# Patient Record
Sex: FEMALE | Race: WHITE | NOT HISPANIC OR LATINO | Employment: FULL TIME | URBAN - METROPOLITAN AREA
[De-identification: names, ages, dates, MRNs, and addresses within clinical notes are randomized per-mention and may not be internally consistent; named-entity substitution may affect disease eponyms.]

---

## 2023-03-21 PROBLEM — B34.9 VIRAL ILLNESS: Status: RESOLVED | Noted: 2023-03-21 | Resolved: 2023-03-21

## 2023-03-21 PROBLEM — N89.8 VAGINAL DISCHARGE: Status: RESOLVED | Noted: 2023-03-21 | Resolved: 2023-03-21

## 2023-03-21 PROBLEM — B96.89 BACTERIAL VAGINITIS: Status: RESOLVED | Noted: 2023-03-21 | Resolved: 2023-03-21

## 2023-03-21 PROBLEM — A60.04 HERPES SIMPLEX VULVOVAGINITIS: Status: RESOLVED | Noted: 2023-03-21 | Resolved: 2023-03-21

## 2023-03-21 PROBLEM — N76.0 BACTERIAL VAGINITIS: Status: RESOLVED | Noted: 2023-03-21 | Resolved: 2023-03-21

## 2023-03-21 RX ORDER — OMEGA-3 FATTY ACIDS/FISH OIL 340-1000MG
CAPSULE ORAL
COMMUNITY
End: 2023-10-13 | Stop reason: ALTCHOICE

## 2023-03-21 RX ORDER — ONDANSETRON HYDROCHLORIDE 8 MG/1
8 TABLET, FILM COATED ORAL EVERY 8 HOURS PRN
COMMUNITY
Start: 2023-02-02 | End: 2023-10-13 | Stop reason: ALTCHOICE

## 2023-03-21 RX ORDER — VIT C/E/ZN/COPPR/LUTEIN/ZEAXAN 250MG-90MG
CAPSULE ORAL
COMMUNITY
End: 2023-10-13 | Stop reason: ALTCHOICE

## 2023-03-21 RX ORDER — EPINEPHRINE 0.22MG
100 AEROSOL WITH ADAPTER (ML) INHALATION DAILY
COMMUNITY
End: 2023-10-13 | Stop reason: ALTCHOICE

## 2023-03-21 RX ORDER — EPINEPHRINE 0.3 MG/.3ML
INJECTION SUBCUTANEOUS
COMMUNITY
Start: 2023-02-17

## 2023-03-21 RX ORDER — PARSLEY 450 MG
1 CAPSULE ORAL DAILY
COMMUNITY
Start: 2021-10-15 | End: 2023-10-13 | Stop reason: ALTCHOICE

## 2023-03-21 RX ORDER — PROCHLORPERAZINE MALEATE 10 MG
10 TABLET ORAL EVERY 6 HOURS PRN
COMMUNITY
Start: 2023-02-02 | End: 2023-10-13 | Stop reason: ALTCHOICE

## 2023-03-21 RX ORDER — LORAZEPAM 0.5 MG/1
TABLET ORAL
COMMUNITY
Start: 2023-03-16 | End: 2023-10-13 | Stop reason: ALTCHOICE

## 2023-03-21 RX ORDER — FEVERFEW EXTRACT 4:1
POWDER (GRAM) MISCELLANEOUS
COMMUNITY
End: 2023-10-13 | Stop reason: ALTCHOICE

## 2023-03-21 RX ORDER — LIDOCAINE AND PRILOCAINE 25; 25 MG/G; MG/G
CREAM TOPICAL
COMMUNITY
Start: 2023-02-02 | End: 2023-10-13 | Stop reason: ALTCHOICE

## 2023-03-21 RX ORDER — NAPROXEN SODIUM 220 MG/1
TABLET ORAL
COMMUNITY
End: 2023-10-13 | Stop reason: ALTCHOICE

## 2023-03-21 RX ORDER — MACA
POWDER (GRAM) MISCELLANEOUS
COMMUNITY
End: 2023-10-13 | Stop reason: ALTCHOICE

## 2023-03-22 ENCOUNTER — APPOINTMENT (OUTPATIENT)
Dept: PRIMARY CARE | Facility: CLINIC | Age: 43
End: 2023-03-22
Payer: COMMERCIAL

## 2023-08-25 ENCOUNTER — HOSPITAL ENCOUNTER (OUTPATIENT)
Dept: DATA CONVERSION | Facility: HOSPITAL | Age: 43
End: 2023-08-25
Attending: SURGERY | Admitting: SURGERY
Payer: COMMERCIAL

## 2023-08-25 DIAGNOSIS — C50.911 MALIGNANT NEOPLASM OF UNSPECIFIED SITE OF RIGHT FEMALE BREAST (MULTI): ICD-10-CM

## 2023-08-25 DIAGNOSIS — Z17.1 ESTROGEN RECEPTOR NEGATIVE STATUS (ER-): ICD-10-CM

## 2023-08-25 DIAGNOSIS — Z92.21 PERSONAL HISTORY OF ANTINEOPLASTIC CHEMOTHERAPY: ICD-10-CM

## 2023-08-25 DIAGNOSIS — C50.511 MALIGNANT NEOPLASM OF LOWER-OUTER QUADRANT OF RIGHT FEMALE BREAST (MULTI): ICD-10-CM

## 2023-08-30 LAB
COMPLETE PATHOLOGY REPORT: NORMAL
CONVERTED CLINICAL DIAGNOSIS-HISTORY: NORMAL
CONVERTED FINAL DIAGNOSIS: NORMAL
CONVERTED FINAL REPORT PDF LINK TO COPY AND PASTE: NORMAL
CONVERTED GROSS DESCRIPTION: NORMAL
CONVERTED SYNOPTIC DIAGNOSIS: NORMAL

## 2023-09-26 VITALS — HEIGHT: 66 IN | WEIGHT: 143.96 LBS | BODY MASS INDEX: 23.14 KG/M2

## 2023-09-26 DIAGNOSIS — C50.919 MALIGNANT NEOPLASM OF FEMALE BREAST, UNSPECIFIED ESTROGEN RECEPTOR STATUS, UNSPECIFIED LATERALITY, UNSPECIFIED SITE OF BREAST (MULTI): Primary | ICD-10-CM

## 2023-09-26 RX ORDER — HEPARIN SODIUM,PORCINE/PF 10 UNIT/ML
50 SYRINGE (ML) INTRAVENOUS AS NEEDED
Status: CANCELLED | OUTPATIENT
Start: 2023-09-30

## 2023-09-26 RX ORDER — HEPARIN 100 UNIT/ML
500 SYRINGE INTRAVENOUS AS NEEDED
Status: CANCELLED | OUTPATIENT
Start: 2023-09-30

## 2023-09-29 VITALS — BODY MASS INDEX: 22.53 KG/M2 | HEIGHT: 66 IN | WEIGHT: 140.21 LBS

## 2023-09-30 RX ORDER — PROCHLORPERAZINE EDISYLATE 5 MG/ML
10 INJECTION INTRAMUSCULAR; INTRAVENOUS EVERY 6 HOURS PRN
Status: CANCELLED | OUTPATIENT
Start: 2023-10-19

## 2023-09-30 RX ORDER — ALBUTEROL SULFATE 0.83 MG/ML
3 SOLUTION RESPIRATORY (INHALATION) AS NEEDED
Status: CANCELLED | OUTPATIENT
Start: 2023-10-19

## 2023-09-30 RX ORDER — FAMOTIDINE 10 MG/ML
20 INJECTION INTRAVENOUS ONCE AS NEEDED
Status: CANCELLED | OUTPATIENT
Start: 2023-10-19

## 2023-09-30 RX ORDER — METHYLPREDNISOLONE SODIUM SUCCINATE 40 MG/ML
40 INJECTION INTRAMUSCULAR; INTRAVENOUS AS NEEDED
Status: CANCELLED | OUTPATIENT
Start: 2023-10-19

## 2023-09-30 RX ORDER — PROCHLORPERAZINE MALEATE 5 MG
10 TABLET ORAL EVERY 6 HOURS PRN
Status: CANCELLED | OUTPATIENT
Start: 2023-10-19

## 2023-09-30 RX ORDER — DIPHENHYDRAMINE HYDROCHLORIDE 50 MG/ML
50 INJECTION INTRAMUSCULAR; INTRAVENOUS AS NEEDED
Status: CANCELLED | OUTPATIENT
Start: 2023-10-19

## 2023-09-30 RX ORDER — EPINEPHRINE 0.3 MG/.3ML
0.3 INJECTION SUBCUTANEOUS EVERY 5 MIN PRN
Status: CANCELLED | OUTPATIENT
Start: 2023-10-19

## 2023-09-30 NOTE — H&P
History & Physical Reviewed:   Pregnant/Lactating:  ·  Are You Pregnant no   ·  Are You Currently Breastfeeding no     I have reviewed the History and Physical dated:  27-Jul-2023   History and Physical reviewed and relevant findings noted. Patient examined to review pertinent physical  findings.: No significant changes   Home Medications Reviewed: no changes noted   Allergies Reviewed: no changes noted       ERAS (Enhanced Recovery After Surgery):  ·  ERAS Patient: no     Consent:   COVID-19 Consent:  ·  COVID-19 Risk Consent Surgeon has reviewed key risks related to the risk of xochitl COVID-19 and if they contract COVID-19 what the risks are.       Electronic Signatures:  Rod Tate)  (Signed 25-Aug-2023 11:04)   Authored: History & Physical Reviewed, ERAS, Consent,  Note Completion      Last Updated: 25-Aug-2023 11:04 by Rod Tate)

## 2023-10-01 NOTE — OP NOTE
PROCEDURE DETAILS    Preoperative Diagnosis:  Right breast cancer status post neoadjuvant chemotherapy  Postoperative Diagnosis:  Right breast cancer status post neoadjuvant chemotherapy  Surgeon: Rod Tate  Resident/Fellow/Other Assistant: MS3    Procedure:  1. Right Breast Needle Localized partial mastectomy  2.  Complex closure 4 cm  3.  [place] Lymph Node Biopsy     Anesthesia: Daniel Goncalves  Estimated Blood Loss: minimal  Findings: [place] lymph node and additional nodes felt normal  Partial mastectomy had clip for the primary tumor no clip seen for the lymph node  Additional tissue sent  Specimens(s) Collected: yes,  right sentinel lymph node, additional nodes partial mastectomy and additional tissue right breast    Patient Returned To/Condition: To recovery room in satisfactory condition        Operative Report:   Clinical note:   This is a woman who presented with a palpable right breast mass.  A biopsy showed a triple negative breast cancer.  She had a second mass seen on imaging and this was an intramammary lymph node which was positive for malignancy.  She received preoperative  chemotherapy and Pembro.  Clinically she had a complete response.  She had a wire placed to the area of the previously palpable mass and 1 to the area of the lymph node.  I injected with technetium 99 labeled solution Lymphoseek to identify the sentinel  nodes.  Identified 1 sentinel node with 250 counts.  There were 2 smaller nodes that came out with this.  They all felt normal.  On the specimen the clip for the lymph node was not seen so additional tissue was taken but still no clip.  On ultrasound  only the clip was seen in the lymph node.  We elected to wait for final pathology and see if the clip was still present on imaging if the lymph node was not seen on pathology.    Operative note:  With the patient in the supine position after adequate general anesthesia was obtained the wires were cut closer to the  skin in the breast chest  neck abdomen upper arm axilla neck were prepped with ChloraPrep she was draped in usual manner.  Made incision lines of the skin of the axilla took this down through stapedius tissue.  Elevated skin flaps in all directions using Bovie cautery.  Using  the gamma probe we identified a hotspot dissected that out with blunt dissection brought up into the wound and excised it using Bovie cautery.  There was a smaller node right below this that came out with the specimen.  This did not have any counts.   The other note at 250 counts.  They both felt normal.  Identified 1 more lymph node which we thought had some counts but after removing from the breast we were just getting background counts.  It is felt normal as well.  We will try the airflow anesthetic  irrigated with saline there was good hemostasis obtained throughout Bovie cautery.  Dana was placed in the base of the wound.  Reapproximated the deep tissue with interrupted 3-0 Vicryl suture.  It reapproximated the skin with interrupted 3-0 Vicryl  suture and closed with a running 4-0 Monocryl subcuticular stitch LiquiBand was applied at the end of the case.  We then made an incision in the breast in the lower outer quadrant between the 2 wires elevated skin flaps in all directions using Bovie cautery.   And for the wires into the skin and pulled up through and then went widely around the wires in all directions using Bovie cautery.  We placed particular attention to staying close to the skin because the lymph nodes look close.  The wire look like it  was on top of the lymph node so he stayed on top of the wires well.  X-ray did not show the clip from the lymph node.  We therefore took some more tissue anteriorly and a little distally as the wire may have been pulled back slightly.  I did not see a  clip in this tissue either.  We examined the tissue could not find the clip.  And infiltrating with local anesthetic irrigated with saline  there was good hemostasis obtained throughout with Bovie cautery and 3-0 Vicryl ties.  We elevated the breast tissue  off of the pectoralis in all directions using Bovie cautery.  We placed clips for future radiation therapy.  We reapproximated the deep tissue with interrupted 3-0 Vicryl suture.  They were then brought tissue down more superficially after raising flaps  with 3-0 Vicryl suture reapproximate the skin with interrupted 3-0 Vicryl suture and closed it with a running 4-0 Monocryl subcuticular stitch LiquiBand fluff dressing and a Surgi-Bra were applied.  She tolerated the procedure well and was taken recovery  room in satisfactory condition      Abiodun Synoptic Op Report:  Breast [place] Node Biopsy  Operation performed with curative intent: yes  Tracer(s) used to identify sentinel nodes in the upfront surgery (non-neoadjuvant) setting: radioactive tracer  Tracer(s) used to identify sentinel nodes in the neoadjuvant setting: not applicable  All nodes (colored or non-colored) present at the end of a dye-filled lymphatic channel were removed: not applicable  All significantly radioactive nodes were removed: yes  All palpably suspicious nodes were removed: yes  Biopsy-proven positive nodes marked with clips prior to chemotherapy were identified and removed: not applicable                    Attestation:   Note Completion:  Attending Attestation I was present for the entire procedure    I am a: Resident/Fellow         Electronic Signatures:  Tamra Brown (Resident))  (Signed 25-Aug-2023 14:50)   Authored: Post-Operative Note, Chart Review, Note Completion  Rod Tate)  (Signed 25-Aug-2023 15:53)   Authored: Post-Operative Note, Chart Review, Note Completion   Co-Signer: Post-Operative Note, Chart Review, Note Completion      Last Updated: 25-Aug-2023 15:53 by Rod Tate)

## 2023-10-11 ENCOUNTER — TELEPHONE (OUTPATIENT)
Dept: RADIATION ONCOLOGY | Facility: CLINIC | Age: 43
End: 2023-10-11
Payer: COMMERCIAL

## 2023-10-11 ENCOUNTER — HOSPITAL ENCOUNTER (OUTPATIENT)
Dept: RADIATION ONCOLOGY | Facility: CLINIC | Age: 43
Setting detail: RADIATION/ONCOLOGY SERIES
Discharge: STILL ADMITTED OR ADMINISTRATIVE | End: 2023-10-11
Payer: COMMERCIAL

## 2023-10-11 ENCOUNTER — LAB (OUTPATIENT)
Dept: LAB | Facility: CLINIC | Age: 43
End: 2023-10-11
Payer: COMMERCIAL

## 2023-10-11 DIAGNOSIS — Z17.1 MALIGNANT NEOPLASM OF LOWER-OUTER QUADRANT OF RIGHT BREAST OF FEMALE, ESTROGEN RECEPTOR NEGATIVE (MULTI): Primary | ICD-10-CM

## 2023-10-11 DIAGNOSIS — C50.511 MALIGNANT NEOPLASM OF LOWER-OUTER QUADRANT OF RIGHT BREAST OF FEMALE, ESTROGEN RECEPTOR NEGATIVE (MULTI): Primary | ICD-10-CM

## 2023-10-11 DIAGNOSIS — Z17.1 MALIGNANT NEOPLASM OF LOWER-OUTER QUADRANT OF RIGHT BREAST OF FEMALE, ESTROGEN RECEPTOR NEGATIVE: ICD-10-CM

## 2023-10-11 DIAGNOSIS — C50.511 MALIGNANT NEOPLASM OF LOWER-OUTER QUADRANT OF RIGHT BREAST OF FEMALE, ESTROGEN RECEPTOR NEGATIVE: ICD-10-CM

## 2023-10-11 LAB — HCG UR QL IA.RAPID: NEGATIVE

## 2023-10-11 PROCEDURE — 77334 RADIATION TREATMENT AID(S): CPT | Performed by: STUDENT IN AN ORGANIZED HEALTH CARE EDUCATION/TRAINING PROGRAM

## 2023-10-11 PROCEDURE — 77290 THER RAD SIMULAJ FIELD CPLX: CPT | Performed by: STUDENT IN AN ORGANIZED HEALTH CARE EDUCATION/TRAINING PROGRAM

## 2023-10-11 PROCEDURE — 81025 URINE PREGNANCY TEST: CPT

## 2023-10-12 PROCEDURE — 77470 SPECIAL RADIATION TREATMENT: CPT | Performed by: STUDENT IN AN ORGANIZED HEALTH CARE EDUCATION/TRAINING PROGRAM

## 2023-10-12 PROCEDURE — 77263 THER RADIOLOGY TX PLNG CPLX: CPT | Performed by: STUDENT IN AN ORGANIZED HEALTH CARE EDUCATION/TRAINING PROGRAM

## 2023-10-13 ENCOUNTER — OFFICE VISIT (OUTPATIENT)
Dept: PRIMARY CARE | Facility: CLINIC | Age: 43
End: 2023-10-13
Payer: COMMERCIAL

## 2023-10-13 VITALS
BODY MASS INDEX: 23.91 KG/M2 | OXYGEN SATURATION: 98 % | HEIGHT: 66 IN | SYSTOLIC BLOOD PRESSURE: 124 MMHG | WEIGHT: 148.8 LBS | DIASTOLIC BLOOD PRESSURE: 74 MMHG | HEART RATE: 99 BPM

## 2023-10-13 DIAGNOSIS — Z17.1 MALIGNANT NEOPLASM OF LOWER-OUTER QUADRANT OF RIGHT BREAST OF FEMALE, ESTROGEN RECEPTOR NEGATIVE (MULTI): Primary | ICD-10-CM

## 2023-10-13 DIAGNOSIS — B34.9 VIRAL ILLNESS: Primary | ICD-10-CM

## 2023-10-13 DIAGNOSIS — C50.511 MALIGNANT NEOPLASM OF LOWER-OUTER QUADRANT OF RIGHT BREAST OF FEMALE, ESTROGEN RECEPTOR NEGATIVE (MULTI): Primary | ICD-10-CM

## 2023-10-13 DIAGNOSIS — J01.10 ACUTE NON-RECURRENT FRONTAL SINUSITIS: ICD-10-CM

## 2023-10-13 PROCEDURE — 99213 OFFICE O/P EST LOW 20 MIN: CPT | Performed by: NURSE PRACTITIONER

## 2023-10-13 PROCEDURE — 1036F TOBACCO NON-USER: CPT | Performed by: NURSE PRACTITIONER

## 2023-10-13 RX ORDER — AMOXICILLIN AND CLAVULANATE POTASSIUM 875; 125 MG/1; MG/1
1 TABLET, FILM COATED ORAL 2 TIMES DAILY
Qty: 20 TABLET | Refills: 0 | Status: SHIPPED | OUTPATIENT
Start: 2023-10-13 | End: 2023-10-23

## 2023-10-13 ASSESSMENT — PATIENT HEALTH QUESTIONNAIRE - PHQ9
SUM OF ALL RESPONSES TO PHQ9 QUESTIONS 1 AND 2: 0
2. FEELING DOWN, DEPRESSED OR HOPELESS: NOT AT ALL
1. LITTLE INTEREST OR PLEASURE IN DOING THINGS: NOT AT ALL

## 2023-10-13 ASSESSMENT — ENCOUNTER SYMPTOMS
RESPIRATORY NEGATIVE: 1
SORE THROAT: 1
FEVER: 0
CARDIOVASCULAR NEGATIVE: 1
PSYCHIATRIC NEGATIVE: 1
CHILLS: 0

## 2023-10-13 ASSESSMENT — COLUMBIA-SUICIDE SEVERITY RATING SCALE - C-SSRS
6. HAVE YOU EVER DONE ANYTHING, STARTED TO DO ANYTHING, OR PREPARED TO DO ANYTHING TO END YOUR LIFE?: NO
1. IN THE PAST MONTH, HAVE YOU WISHED YOU WERE DEAD OR WISHED YOU COULD GO TO SLEEP AND NOT WAKE UP?: NO
2. HAVE YOU ACTUALLY HAD ANY THOUGHTS OF KILLING YOURSELF?: NO

## 2023-10-13 NOTE — PROGRESS NOTES
"Subjective   Patient ID: Jose Honeycutt is a 43 y.o. female who presents for Sore Throat (3-4 days), Headache, and Earache.    Sore Throat  Patient complains of sore throat. Associated symptoms include nasal blockage, post nasal drip, sinus and nasal congestion, and sore throat. Onset of symptoms was 4 day ago, and have been gradually worsening since that time. She is drinking plenty of fluids. She has not had recent close exposure to someone with proven streptococcal pharyngitis.           Review of Systems   Constitutional:  Negative for chills and fever.   HENT:  Positive for postnasal drip, sneezing and sore throat.    Respiratory: Negative.     Cardiovascular: Negative.    Psychiatric/Behavioral: Negative.         Objective   /74   Pulse 99   Ht 1.67 m (5' 5.75\")   Wt 67.5 kg (148 lb 12.8 oz)   SpO2 98%   BMI 24.20 kg/m²     Physical Exam  HENT:      Right Ear: A middle ear effusion is present.      Left Ear: A middle ear effusion is present.      Nose: Congestion present.      Right Sinus: Frontal sinus tenderness present.   Cardiovascular:      Rate and Rhythm: Normal rate.      Heart sounds: Normal heart sounds.   Pulmonary:      Effort: Pulmonary effort is normal.      Breath sounds: Normal breath sounds.   Skin:     Capillary Refill: Capillary refill takes less than 2 seconds.   Neurological:      Mental Status: She is alert.         Assessment/Plan   Problem List Items Addressed This Visit    None  Visit Diagnoses         Codes    Viral illness    -  Primary B34.9    Acute non-recurrent frontal sinusitis     J01.10    Relevant Medications    amoxicillin-pot clavulanate (Augmentin) 875-125 mg tablet          Reviewed with patient at this point I feel it is viral but if symptoms worsen and continue to worsen do not improve sent in Augmentin.  Patient to follow-up as needed     "

## 2023-10-13 NOTE — PATIENT INSTRUCTIONS
Viral Upper Respiratory Infection (Common Cold)  Duration: Symptoms usually last for 3-7 days, though they can linger for up to two weeks.  Onset: Symptoms come on gradually over a few days.  Nasal Discharge: Starts out watery and clear, then becomes thicker and darker.  Fever: Rare in adults, more common in children.  Cough: Common.  Sore Throat: Common at the beginning.  Body Aches: Mild body aches might be present.  Treatment: Supportive care, such as rest, hydration, and over-the-counter cold remedies. Antibiotics are not effective against viruses.  Sinusitis (Sinus Infection)  Duration: Symptoms can be:  Acute: Lasting less than 4 weeks.  Subacute: Lasting 4-12 weeks.  Chronic: Lasting more than 12 weeks.  Onset: Symptoms might start like a cold but then get worse or linger beyond the typical duration of a cold.  Nasal Discharge: Thick yellow or green discharge.  Fever: Possible, especially in acute bacterial sinusitis.  Cough: Often worse at night.  Sore Throat: Can be caused by postnasal drip.  Facial Pain/Pressure: Pain is often located around the forehead, cheeks, and eyes. It may get worse when bending forward.  Other Symptoms: Bad breath, reduced sense of smell/taste, fatigue.  Treatment:  Acute: Might be viral or bacterial. If symptoms don't improve after 7-10 days or get significantly worse after 5-7 days, bacterial sinusitis might be suspected, and antibiotics may be prescribed.  Chronic: Often involves a combination of factors, including potential anatomical issues, allergies, or persistent bacterial or fungal infections. Treatment might involve longer courses of antibiotics, nasal steroids, nasal saline irrigation, or surgery in refractory cases.  Overlap and Complications:  It's worth noting that a cold (viral upper respiratory infection) can lead to sinusitis. If the sinuses get blocked during a cold, they can become a breeding ground for bacteria, leading to a secondary bacterial infection.   Detail Level: Detailed Cpt Desired:  Showing: no pathologic findings Koh Intro Text (From The.....): A KOH prep was ordered and evaluated from the Koh Procedure Text (Tissue Harvesting Technique): A 15-blade scalpel was used to scrape the skin. The skin scrapings were placed on a glass slide, covered with a coverslip and a KOH solution was applied.

## 2023-10-15 PROBLEM — C50.511 MALIGNANT NEOPLASM OF LOWER-OUTER QUADRANT OF RIGHT BREAST OF FEMALE, ESTROGEN RECEPTOR NEGATIVE (MULTI): Status: ACTIVE | Noted: 2023-10-15

## 2023-10-15 PROBLEM — Z17.1 MALIGNANT NEOPLASM OF LOWER-OUTER QUADRANT OF RIGHT BREAST OF FEMALE, ESTROGEN RECEPTOR NEGATIVE (MULTI): Status: ACTIVE | Noted: 2023-10-15

## 2023-10-16 ENCOUNTER — LAB (OUTPATIENT)
Dept: HEMATOLOGY/ONCOLOGY | Facility: HOSPITAL | Age: 43
End: 2023-10-16
Payer: COMMERCIAL

## 2023-10-16 DIAGNOSIS — C50.919 MALIGNANT NEOPLASM OF BREAST IN FEMALE, ESTROGEN RECEPTOR NEGATIVE, UNSPECIFIED LATERALITY, UNSPECIFIED SITE OF BREAST (MULTI): ICD-10-CM

## 2023-10-16 DIAGNOSIS — C50.919 MALIGNANT NEOPLASM OF BREAST IN FEMALE, ESTROGEN RECEPTOR NEGATIVE, UNSPECIFIED LATERALITY, UNSPECIFIED SITE OF BREAST (MULTI): Primary | ICD-10-CM

## 2023-10-16 DIAGNOSIS — Z17.1 MALIGNANT NEOPLASM OF BREAST IN FEMALE, ESTROGEN RECEPTOR NEGATIVE, UNSPECIFIED LATERALITY, UNSPECIFIED SITE OF BREAST (MULTI): ICD-10-CM

## 2023-10-16 DIAGNOSIS — C50.819 MALIGNANT NEOPLASM OF OVERLAPPING SITES OF BREAST IN FEMALE, ESTROGEN RECEPTOR NEGATIVE, UNSPECIFIED LATERALITY (MULTI): Primary | ICD-10-CM

## 2023-10-16 DIAGNOSIS — Z17.1 MALIGNANT NEOPLASM OF BREAST IN FEMALE, ESTROGEN RECEPTOR NEGATIVE, UNSPECIFIED LATERALITY, UNSPECIFIED SITE OF BREAST (MULTI): Primary | ICD-10-CM

## 2023-10-16 DIAGNOSIS — Z17.1 MALIGNANT NEOPLASM OF OVERLAPPING SITES OF BREAST IN FEMALE, ESTROGEN RECEPTOR NEGATIVE, UNSPECIFIED LATERALITY (MULTI): Primary | ICD-10-CM

## 2023-10-16 DIAGNOSIS — C50.511 MALIGNANT NEOPLASM OF LOWER-OUTER QUADRANT OF RIGHT BREAST OF FEMALE, ESTROGEN RECEPTOR NEGATIVE (MULTI): ICD-10-CM

## 2023-10-16 DIAGNOSIS — Z17.1 MALIGNANT NEOPLASM OF LOWER-OUTER QUADRANT OF RIGHT BREAST OF FEMALE, ESTROGEN RECEPTOR NEGATIVE (MULTI): ICD-10-CM

## 2023-10-16 LAB
ALBUMIN SERPL BCP-MCNC: 4.1 G/DL (ref 3.4–5)
ALP SERPL-CCNC: 92 U/L (ref 33–110)
ALT SERPL W P-5'-P-CCNC: 22 U/L (ref 7–45)
ANION GAP SERPL CALC-SCNC: 12 MMOL/L (ref 10–20)
AST SERPL W P-5'-P-CCNC: 17 U/L (ref 9–39)
B-HCG SERPL-ACNC: <3 MIU/ML
BASOPHILS # BLD AUTO: 0.02 X10*3/UL (ref 0–0.1)
BASOPHILS NFR BLD AUTO: 0.4 %
BILIRUB SERPL-MCNC: 0.3 MG/DL (ref 0–1.2)
BUN SERPL-MCNC: 13 MG/DL (ref 6–23)
CALCIUM SERPL-MCNC: 9.3 MG/DL (ref 8.6–10.3)
CHLORIDE SERPL-SCNC: 104 MMOL/L (ref 98–107)
CO2 SERPL-SCNC: 28 MMOL/L (ref 21–32)
CREAT SERPL-MCNC: 0.55 MG/DL (ref 0.5–1.05)
EOSINOPHIL # BLD AUTO: 0.16 X10*3/UL (ref 0–0.7)
EOSINOPHIL NFR BLD AUTO: 3.3 %
ERYTHROCYTE [DISTWIDTH] IN BLOOD BY AUTOMATED COUNT: 12.2 % (ref 11.5–14.5)
GFR SERPL CREATININE-BSD FRML MDRD: >90 ML/MIN/1.73M*2
GLUCOSE SERPL-MCNC: 96 MG/DL (ref 74–99)
HCT VFR BLD AUTO: 36.9 % (ref 36–46)
HGB BLD-MCNC: 12.4 G/DL (ref 12–16)
IMM GRANULOCYTES # BLD AUTO: 0.05 X10*3/UL (ref 0–0.7)
IMM GRANULOCYTES NFR BLD AUTO: 1 % (ref 0–0.9)
LYMPHOCYTES # BLD AUTO: 1.42 X10*3/UL (ref 1.2–4.8)
LYMPHOCYTES NFR BLD AUTO: 28.9 %
MCH RBC QN AUTO: 30.7 PG (ref 26–34)
MCHC RBC AUTO-ENTMCNC: 33.6 G/DL (ref 32–36)
MCV RBC AUTO: 91 FL (ref 80–100)
MONOCYTES # BLD AUTO: 0.41 X10*3/UL (ref 0.1–1)
MONOCYTES NFR BLD AUTO: 8.4 %
NEUTROPHILS # BLD AUTO: 2.85 X10*3/UL (ref 1.2–7.7)
NEUTROPHILS NFR BLD AUTO: 58 %
NRBC BLD-RTO: 0 /100 WBCS (ref 0–0)
PLATELET # BLD AUTO: 186 X10*3/UL (ref 150–450)
PMV BLD AUTO: 9.5 FL (ref 7.5–11.5)
POTASSIUM SERPL-SCNC: 3.9 MMOL/L (ref 3.5–5.3)
PROT SERPL-MCNC: 6.8 G/DL (ref 6.4–8.2)
RBC # BLD AUTO: 4.04 X10*6/UL (ref 4–5.2)
SODIUM SERPL-SCNC: 140 MMOL/L (ref 136–145)
TSH SERPL-ACNC: 1.26 MIU/L (ref 0.44–3.98)
WBC # BLD AUTO: 4.9 X10*3/UL (ref 4.4–11.3)

## 2023-10-16 PROCEDURE — 84443 ASSAY THYROID STIM HORMONE: CPT | Mod: CMCLAB

## 2023-10-16 PROCEDURE — 84702 CHORIONIC GONADOTROPIN TEST: CPT

## 2023-10-16 PROCEDURE — 36591 DRAW BLOOD OFF VENOUS DEVICE: CPT

## 2023-10-16 PROCEDURE — 84075 ASSAY ALKALINE PHOSPHATASE: CPT

## 2023-10-16 PROCEDURE — 85025 COMPLETE CBC W/AUTO DIFF WBC: CPT

## 2023-10-16 PROCEDURE — 84702 CHORIONIC GONADOTROPIN TEST: CPT | Mod: CMCLAB

## 2023-10-16 PROCEDURE — 96523 IRRIG DRUG DELIVERY DEVICE: CPT

## 2023-10-18 ENCOUNTER — APPOINTMENT (OUTPATIENT)
Dept: INTEGRATIVE MEDICINE | Facility: CLINIC | Age: 43
End: 2023-10-18
Payer: COMMERCIAL

## 2023-10-18 ENCOUNTER — ALLIED HEALTH (OUTPATIENT)
Dept: INTEGRATIVE MEDICINE | Facility: CLINIC | Age: 43
End: 2023-10-18
Payer: COMMERCIAL

## 2023-10-18 DIAGNOSIS — C50.511 MALIGNANT NEOPLASM OF LOWER-OUTER QUADRANT OF RIGHT BREAST OF FEMALE, ESTROGEN RECEPTOR NEGATIVE (MULTI): Primary | ICD-10-CM

## 2023-10-18 DIAGNOSIS — Z17.1 MALIGNANT NEOPLASM OF LOWER-OUTER QUADRANT OF RIGHT BREAST OF FEMALE, ESTROGEN RECEPTOR NEGATIVE (MULTI): Primary | ICD-10-CM

## 2023-10-18 PROCEDURE — 99213 OFFICE O/P EST LOW 20 MIN: CPT | Performed by: HOSPITALIST

## 2023-10-18 ASSESSMENT — PAIN SCALES - GENERAL: PAINLEVEL_OUTOF10: 3

## 2023-10-18 NOTE — PROGRESS NOTES
Acupuncture Visit:     Subjective   Patient ID: Jose Honeycutt is a 43 y.o. female who presents for No chief complaint on file.  HPI     Labs reviewed per Integrative oncology guidelines. ANC >1000, Platelets >20. No contraindications to treatment. Patient advised of usual risks of acupuncture including bleeding, bruising, and infection and verbalized understanding.     Congestion PND cold since last week  Sore thoroat at beginning  Right side hip flexor more painful . Pain in foot is still there, not worse  Leg only painful when moving  Hot flashes are less intense, but still present   Energy OK.           Pre-treatment Assessment  Treatment Precautions: None  Pain Score: 4  Anxiety Level (0-10): 2  Stress Level (0-10): 0  Coping Level (0-10): 10  Depression Level (0-10): 0  Fatigue Level (0-10): 3  Nausea Level (0-10): 0  Wellbeing Level (0-10): 2    Review of Systems         Provider reviewed plan for the acupuncture session, precautions and contraindications. Patient/guardian/hospital staff has given consent to treat with full understanding of what to expect during the session. Before acupuncture began, provider explained to the patient to communicate at any time if the procedure was causing discomfort past their tolerance level. Patient agreed to advise acupuncturist. The acupuncturist counseled the patient on the risks of acupuncture treatment including pain, infection, bleeding, and no relief of pain. The patient was positioned comfortably. There was no evidence of infection at the site of needle insertions.    Objective   Physical Exam     Points: ear Rubio men, KI, LR, LI11, LI4, HT7, SP6, KI7, KI3, KI6, LR3  add GB40 and bobby little toe MTP right   Heat lamp: none           Needles in/out: 22 +2                   Post-treatment Assessment  Pain Score: 3  Anxiety Level (0-10): 1  Stress Level (0-10): 0  Coping Level (0-10): 10  Depression Level (0-10): 0  Fatigue Level (0-10): 1  Nausea Level (0-10):  0  Wellbeing Level (0-10): 2    Assessment/Plan

## 2023-10-18 NOTE — PROGRESS NOTES
Patient ID: Jose Honeycutt is a 43 y.o. female.  Referring Physician: No referring provider defined for this encounter.  Primary Care Provider: LEAH Talavera-CNP    CANCER HISTORY:   No ref. provider found   For:  [Associated problem not found]   Chief complaints and symptoms:  44 yo woman with R breast IDC, triple negative  Genetics negative  Patient has 2 children, and notes completion of family building.      PMH: SVT/Afib, anxiety, triple negative breast CA   Psych History: Post partum depression/anxiety following birth of son,   generalized anxiety at baseline, is actively doing talk therapy   Surg Hx: catheter ablation 2020 for SVT      Treatment History:   Neoadjuvant carboplatin, paclitaxel, pembrolizumab; followed by doxorubicin,   cyclophosphamide, pembrolizumab (per KEYNOTE-522 protocol):      C1D1 2/16/23 (pembrolizumab was received, but developed a grade 3 infusion   reaction to paclitaxel).   - Switched to Abraxane/carboplatin for week #2. No issues.   - Had possible reaction versus anxiety attack during week #3. Treatment was   held.   Plan to have re-evaluation with surgery and adjuvant AC/keytruda.      now s/p surgery  rad/onc f/u pending      History of Present IllnessConsulted by: Dr. Knight  For: breast cancer     Started AC - c/b fatigue but o/w doing well - feeling good now overall  Bloating but o/w doing well, contd and trying to drink tea and eat smaller portions - improved with similase     Chief complaints and symptoms:  Managing chemo side effects without pharmaceuticals  Menstrual irreg/premature menopause - very limited right now - has some anxiety around periods     Anxiety and diff sleeping - improved significantly but cutting out caffeine, being active - doing well now  waking up in middle of night  improved anxiety and insomnia - ok now     More nausea now, drinking kathe tea     Hot flashes - improved, almost gone     Fatigue - doing well, has for a few days after chemo,  now done, still tired     Joint stiffness a little worse in knees/hips, feet, increased activity     GERD symptoms with chemo, now with bloating  Occas chest pains, burning feeling     Interested in: acupuncture, Chinese herbs, guided imagery, nutrition, stress management     Diet: Eating very well overall, cut out fast food/junk food  More plant based - contd  Doing well overall, has made food changes we suggested     PA: walking 2-3 times/week, using treadmill, not steady right now     Sleep:Diff staying asleep, racing thoughts usually middle of the night - now sleeping better  Doing morning meditation, sleep hygiene  Journals prior to sleeping  Sleeping well now, cut out caffeine  no diff falling asleep but diff staying asleep     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   On all of the above she noted not experiencing any PMS or anxiety. She is   currently only taking gerry root/borage oil and magnesium.      CoQ10 100 mg/day  Vit D3 1000 IU/day  Fish Oil  Gerry Root  Mag  claritin  Lorazepam as needed  Protonix   Consulted by: Dr. Knight  For: breast cancer     Started AC - c/b fatigue but o/w doing well - feeling good now overall  Bloating but o/w doing well, contd and trying to drink tea and eat smaller portions - improved with similase     Chief complaints and symptoms:  Managing chemo side effects without pharmaceuticals  Menstrual irreg/premature menopause - very limited right now - has some anxiety around periods     Anxiety and diff sleeping - improved significantly but cutting out caffeine, being active - doing well now  waking up in middle of night  improved  anxiety and insomnia - ok now     More nausea now, drinking ginger tea     Hot flashes - improved, almost gone     Fatigue - doing well, has for a few days after chemo, now done, still tired     Joint stiffness a little worse in knees/hips  R hip muscle pain and pain in R foot     GERD symptoms with chemo, now with bloating  Occas chest pains, burning feeling     Interested in: acupuncture, Chinese herbs, guided imagery, nutrition, stress management     Diet: Eating very well overall, cut out fast food/junk food  More plant based - contd  Doing well overall, has made food changes we suggested     PA: walking 2-3 times/week, using treadmill, not steady right now     Sleep:Diff staying asleep, racing thoughts usually middle of the night - now sleeping better  Doing morning meditation, sleep hygiene  Journals prior to sleeping  Sleeping well now, cut out caffeine  no diff falling asleep but diff staying asleep     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   On all of the above she noted not experiencing any PMS or anxiety. She is   currently only taking gerry root/borage oil and magnesium.      CoQ10 100 mg/day  Vit D3 1000 IU/day  Fish Oil  Gerry Root  Mag  claritin  Lorazepam as needed  Protonix     ROS:  no ha, visual symptoms, hearing loss  no sob, chest pain, palp  ROS o/w non contributory, please see HPI    Objective    BSA: There is no height or weight on file to calculate BSA.  There were no vitals taken for this visit.    PHYSICAL EXAM:  NAD, awake/alert  HEENT, NCAT, OP clear, no oral lesions  CTA bilat  RRR no mgr  Abd soft/nt/nd+bs  No c/c/e/ttp  Motor/sensory intact,  CN 2-12 intact     RESULTS:  Lab Results   Component Value Date    WBC 4.9 10/16/2023    HGB 12.4 10/16/2023    HCT 36.9 10/16/2023     10/16/2023    CREATININE 0.55 10/16/2023    AST 17 10/16/2023       Integrative Labs:    Functional Tests:    Assessment/Plan   Cancer Staging   Malignant neoplasm of female breast (CMS/HCC)  Staging form: Breast, AJCC 8th Edition  - Clinical stage from 1/19/2023: Stage IIB (cT1c, cN1(f), cM0, G3, ER-, NV-, HER2-) - Signed by Mango Hernandez MD on 10/11/2023  - Pathologic stage from 8/25/2023: No Stage Recommended (ypT0, pN0(sn), cM0, G3, ER-, NV-, HER2-) - Signed by Mango Hernandez MD on 10/11/2023      CANCER SPECIFIC RECCS:  42 yo woman with triple negative breast cancer on neoadj carbo/abraxane/keytruda f/b surgery then AC/keytruda as plan.  Doing well on AC/pembro chemo      Breast cancer:  Whole Foods plant based diet  Limit sugar  Limit alcohol     Exercise 30 min/day 5 days a week, vary by balance, cardio and resistance training  Continue walking now  Some fatigue - consider American ginseng 2 grams/day     Supplements to consider:  Melatonin 1-3 mg at night 1 hr prior to sleep - not taking  Vitamin D3 1792-5092 IU/day  Omega 3 supplementation (nordic naturals)     Sleep - try to get over 7 hours/night  Limit TV or electronics at night  Limit caffeine intake and only to the morning  Get exposure to light in the morning if possible  Meditation and/or breathing exercises in the morning are helpful  5 min breathing exercises: Alternate Nostril Breathing and Derek Corby breathing  Continue walking middle of the day     Sleep hygiene - should try to sleep by 10 pm  Limit TV or exposure to light at night including cell phones  Limit caffeine to AM only if needed (e.g. coffee)  Morning exposure to light, getting outside or bright white light in the morning  [place] Tea  Consider melatonin   Consider massage  Consider similase (integrative therapeutics) for bloating     Irreg  periods - likely from chemo, f/b endocrine     Anxiety - has tried SSRI in past without relief. Manifested with sleep interruption and cardiac symptoms.  - Patient connected with [facility] for peer support   - Patient currently seeing therapist for talk therapy   - Referral made to onco-psychiatry to explore other medication management   Doing better overall, meditating in am  strategies -- maybe beta blocker propranolol/atenolol?   cognitive behavioral therapy - seeing her as well, has improved symptoms     Hot Flashes:gotten worse  Acupuncture - have an appt - next month, symptom management clinic  Yoga is helpful as is stress management  Consider Acteane (Boiron, homeopathic)  Relazen (Swedish Herb)     F/u in symptom management    SYMPTOM MANAGEMENT:  Integrative Oncology Symptom Management:     The integrative oncology symptom management clinic offers supervised care of cancer patients using Integrative Modalities billed to insurance using NCCN and SIO/ASCO guideline-driven practices.  ESAS is obtained prior to and after each treatment by practitioner     Symptoms Managed:  Hot Flashes - resumed but decreased since last visit, a few a day now, less intense but just as freq     Nausea - improved     Fatigue - gone     Anxiety/Insomnia - intermittent, starting radiation soon     Pain - joint stiffness in knees/hips, continued now but overall improved  hip pain R and R foot pain nael after walking (not during)  now overall improved otherwise  increased activity     Bloating - eating anything, trying smaller portions, much improved     Natural Products utilized:  CoQ10 100 mg/day  Vit D3 1000 IU/day  Fish Oil  Danielle Root  Mag  claritin  Lorazepam as needed  Protonix   Similase     Integrative Treatment: Acupuncture     Session #: 10  Frequency: Weekly     Referrals: Yoga  Massage     Recommendations:  Continue CBT  Suggest trying essential oils - lavender or peppermint in diffuser for hot flashes  Acteane  or relazen  Consider meds like cymbalta for pain and hot flashes o/w     Follow Up:  Symptom Management: Weekly  Integrative Oncology: 3 mo     I have personally seen the patient and supervised the treatment by the integrative practitioner during this visit.  Pt had symptoms discussed and I was present for the patient's 45 minutes of direct patient care.    Follow up:     Thank you for consulting me in for this patient  Joseph Fuchs MD

## 2023-10-19 ENCOUNTER — OFFICE VISIT (OUTPATIENT)
Dept: HEMATOLOGY/ONCOLOGY | Facility: HOSPITAL | Age: 43
End: 2023-10-19
Payer: COMMERCIAL

## 2023-10-19 ENCOUNTER — INFUSION (OUTPATIENT)
Dept: HEMATOLOGY/ONCOLOGY | Facility: HOSPITAL | Age: 43
End: 2023-10-19
Payer: COMMERCIAL

## 2023-10-19 VITALS
HEART RATE: 85 BPM | WEIGHT: 147.49 LBS | TEMPERATURE: 97.3 F | RESPIRATION RATE: 15 BRPM | OXYGEN SATURATION: 99 % | BODY MASS INDEX: 23.7 KG/M2 | DIASTOLIC BLOOD PRESSURE: 79 MMHG | HEIGHT: 66 IN | SYSTOLIC BLOOD PRESSURE: 115 MMHG

## 2023-10-19 DIAGNOSIS — C50.919 MALIGNANT NEOPLASM OF FEMALE BREAST, UNSPECIFIED ESTROGEN RECEPTOR STATUS, UNSPECIFIED LATERALITY, UNSPECIFIED SITE OF BREAST (MULTI): Primary | ICD-10-CM

## 2023-10-19 DIAGNOSIS — Z29.89 ENCOUNTER FOR IMMUNOTHERAPY: ICD-10-CM

## 2023-10-19 DIAGNOSIS — M25.551 RIGHT HIP PAIN: ICD-10-CM

## 2023-10-19 DIAGNOSIS — C50.919 MALIGNANT NEOPLASM OF FEMALE BREAST, UNSPECIFIED ESTROGEN RECEPTOR STATUS, UNSPECIFIED LATERALITY, UNSPECIFIED SITE OF BREAST (MULTI): ICD-10-CM

## 2023-10-19 LAB
ALBUMIN SERPL BCP-MCNC: 4.2 G/DL (ref 3.4–5)
ALP SERPL-CCNC: 96 U/L (ref 33–110)
ALT SERPL W P-5'-P-CCNC: 18 U/L (ref 7–45)
ANION GAP SERPL CALC-SCNC: 12 MMOL/L (ref 10–20)
AST SERPL W P-5'-P-CCNC: 19 U/L (ref 9–39)
BASOPHILS # BLD AUTO: 0.03 X10*3/UL (ref 0–0.1)
BASOPHILS NFR BLD AUTO: 0.6 %
BILIRUB SERPL-MCNC: 0.2 MG/DL (ref 0–1.2)
BUN SERPL-MCNC: 13 MG/DL (ref 6–23)
CALCIUM SERPL-MCNC: 9.2 MG/DL (ref 8.6–10.3)
CHLORIDE SERPL-SCNC: 105 MMOL/L (ref 98–107)
CO2 SERPL-SCNC: 26 MMOL/L (ref 21–32)
CREAT SERPL-MCNC: 0.5 MG/DL (ref 0.5–1.05)
EOSINOPHIL # BLD AUTO: 0.2 X10*3/UL (ref 0–0.7)
EOSINOPHIL NFR BLD AUTO: 3.9 %
ERYTHROCYTE [DISTWIDTH] IN BLOOD BY AUTOMATED COUNT: 11.9 % (ref 11.5–14.5)
GFR SERPL CREATININE-BSD FRML MDRD: >90 ML/MIN/1.73M*2
GLUCOSE SERPL-MCNC: 98 MG/DL (ref 74–99)
HCT VFR BLD AUTO: 37.1 % (ref 36–46)
HGB BLD-MCNC: 12.2 G/DL (ref 12–16)
IMM GRANULOCYTES # BLD AUTO: 0.01 X10*3/UL (ref 0–0.7)
IMM GRANULOCYTES NFR BLD AUTO: 0.2 % (ref 0–0.9)
LYMPHOCYTES # BLD AUTO: 1.63 X10*3/UL (ref 1.2–4.8)
LYMPHOCYTES NFR BLD AUTO: 32.1 %
MCH RBC QN AUTO: 30.2 PG (ref 26–34)
MCHC RBC AUTO-ENTMCNC: 32.9 G/DL (ref 32–36)
MCV RBC AUTO: 92 FL (ref 80–100)
MONOCYTES # BLD AUTO: 0.43 X10*3/UL (ref 0.1–1)
MONOCYTES NFR BLD AUTO: 8.5 %
NEUTROPHILS # BLD AUTO: 2.77 X10*3/UL (ref 1.2–7.7)
NEUTROPHILS NFR BLD AUTO: 54.7 %
NRBC BLD-RTO: 0 /100 WBCS (ref 0–0)
PLATELET # BLD AUTO: 203 X10*3/UL (ref 150–450)
PMV BLD AUTO: 8.9 FL (ref 7.5–11.5)
POTASSIUM SERPL-SCNC: 4 MMOL/L (ref 3.5–5.3)
PROT SERPL-MCNC: 6.8 G/DL (ref 6.4–8.2)
RBC # BLD AUTO: 4.04 X10*6/UL (ref 4–5.2)
SODIUM SERPL-SCNC: 139 MMOL/L (ref 136–145)
TSH SERPL-ACNC: 1.35 MIU/L (ref 0.44–3.98)
WBC # BLD AUTO: 5.1 X10*3/UL (ref 4.4–11.3)

## 2023-10-19 PROCEDURE — 99214 OFFICE O/P EST MOD 30 MIN: CPT | Mod: 59 | Performed by: INTERNAL MEDICINE

## 2023-10-19 PROCEDURE — 99214 OFFICE O/P EST MOD 30 MIN: CPT | Performed by: INTERNAL MEDICINE

## 2023-10-19 PROCEDURE — 85025 COMPLETE CBC W/AUTO DIFF WBC: CPT

## 2023-10-19 PROCEDURE — 84443 ASSAY THYROID STIM HORMONE: CPT | Mod: CMCLAB

## 2023-10-19 PROCEDURE — 1036F TOBACCO NON-USER: CPT | Performed by: INTERNAL MEDICINE

## 2023-10-19 PROCEDURE — 96413 CHEMO IV INFUSION 1 HR: CPT

## 2023-10-19 PROCEDURE — 2500000004 HC RX 250 GENERAL PHARMACY W/ HCPCS (ALT 636 FOR OP/ED)

## 2023-10-19 PROCEDURE — 80053 COMPREHEN METABOLIC PANEL: CPT

## 2023-10-19 PROCEDURE — 2500000004 HC RX 250 GENERAL PHARMACY W/ HCPCS (ALT 636 FOR OP/ED): Mod: JZ | Performed by: INTERNAL MEDICINE

## 2023-10-19 PROCEDURE — 84443 ASSAY THYROID STIM HORMONE: CPT

## 2023-10-19 RX ORDER — EPINEPHRINE 0.3 MG/.3ML
0.3 INJECTION SUBCUTANEOUS EVERY 5 MIN PRN
Status: DISCONTINUED | OUTPATIENT
Start: 2023-10-19 | End: 2023-10-19 | Stop reason: HOSPADM

## 2023-10-19 RX ORDER — PROCHLORPERAZINE EDISYLATE 5 MG/ML
10 INJECTION INTRAMUSCULAR; INTRAVENOUS EVERY 6 HOURS PRN
Status: CANCELLED | OUTPATIENT
Start: 2023-11-30

## 2023-10-19 RX ORDER — DIPHENHYDRAMINE HYDROCHLORIDE 50 MG/ML
50 INJECTION INTRAMUSCULAR; INTRAVENOUS AS NEEDED
Status: CANCELLED | OUTPATIENT
Start: 2023-11-30

## 2023-10-19 RX ORDER — PROCHLORPERAZINE MALEATE 5 MG
10 TABLET ORAL EVERY 6 HOURS PRN
Status: CANCELLED | OUTPATIENT
Start: 2023-11-30

## 2023-10-19 RX ORDER — EPINEPHRINE 0.3 MG/.3ML
0.3 INJECTION SUBCUTANEOUS EVERY 5 MIN PRN
Status: CANCELLED | OUTPATIENT
Start: 2023-11-09

## 2023-10-19 RX ORDER — PROCHLORPERAZINE EDISYLATE 5 MG/ML
10 INJECTION INTRAMUSCULAR; INTRAVENOUS EVERY 6 HOURS PRN
Status: CANCELLED | OUTPATIENT
Start: 2023-11-09

## 2023-10-19 RX ORDER — LORATADINE 10 MG/1
10 TABLET ORAL DAILY
COMMUNITY

## 2023-10-19 RX ORDER — DIPHENHYDRAMINE HYDROCHLORIDE 50 MG/ML
50 INJECTION INTRAMUSCULAR; INTRAVENOUS AS NEEDED
Status: CANCELLED | OUTPATIENT
Start: 2023-11-09

## 2023-10-19 RX ORDER — EPINEPHRINE 0.3 MG/.3ML
0.3 INJECTION SUBCUTANEOUS EVERY 5 MIN PRN
Status: CANCELLED | OUTPATIENT
Start: 2023-11-30

## 2023-10-19 RX ORDER — PROCHLORPERAZINE MALEATE 5 MG
10 TABLET ORAL EVERY 6 HOURS PRN
Status: CANCELLED | OUTPATIENT
Start: 2023-11-09

## 2023-10-19 RX ORDER — ALBUTEROL SULFATE 0.83 MG/ML
3 SOLUTION RESPIRATORY (INHALATION) AS NEEDED
Status: CANCELLED | OUTPATIENT
Start: 2023-11-09

## 2023-10-19 RX ORDER — ALBUTEROL SULFATE 0.83 MG/ML
3 SOLUTION RESPIRATORY (INHALATION) AS NEEDED
Status: DISCONTINUED | OUTPATIENT
Start: 2023-10-19 | End: 2023-10-19 | Stop reason: HOSPADM

## 2023-10-19 RX ORDER — FAMOTIDINE 10 MG/ML
20 INJECTION INTRAVENOUS ONCE AS NEEDED
Status: CANCELLED | OUTPATIENT
Start: 2023-11-30

## 2023-10-19 RX ORDER — HEPARIN 100 UNIT/ML
SYRINGE INTRAVENOUS
Status: COMPLETED
Start: 2023-10-19 | End: 2023-10-19

## 2023-10-19 RX ORDER — DIPHENHYDRAMINE HYDROCHLORIDE 50 MG/ML
50 INJECTION INTRAMUSCULAR; INTRAVENOUS AS NEEDED
Status: DISCONTINUED | OUTPATIENT
Start: 2023-10-19 | End: 2023-10-19 | Stop reason: HOSPADM

## 2023-10-19 RX ORDER — FAMOTIDINE 10 MG/ML
20 INJECTION INTRAVENOUS ONCE AS NEEDED
Status: CANCELLED | OUTPATIENT
Start: 2023-11-09

## 2023-10-19 RX ORDER — ALBUTEROL SULFATE 0.83 MG/ML
3 SOLUTION RESPIRATORY (INHALATION) AS NEEDED
Status: CANCELLED | OUTPATIENT
Start: 2023-11-30

## 2023-10-19 RX ORDER — FAMOTIDINE 10 MG/ML
20 INJECTION INTRAVENOUS ONCE AS NEEDED
Status: DISCONTINUED | OUTPATIENT
Start: 2023-10-19 | End: 2023-10-19 | Stop reason: HOSPADM

## 2023-10-19 RX ADMIN — SODIUM CHLORIDE 200 MG: 9 INJECTION, SOLUTION INTRAVENOUS at 12:22

## 2023-10-19 RX ADMIN — HEPARIN 500 UNITS: 100 SYRINGE at 13:00

## 2023-10-19 ASSESSMENT — PAIN SCALES - GENERAL: PAINLEVEL: 0-NO PAIN

## 2023-10-19 NOTE — SIGNIFICANT EVENT
10/19/23 1150   Prechemo Checklist   Has the patient been in the hospital, ED, or urgent care since last date of service No   Chemo/Immuno Consent Signed Yes   Protocol/Indications Verified Yes   Confirmed to previous date/time of medication Yes   Compared to previous dose Yes   All medications are dated accurately Yes   Pregnancy Test Negative Yes   BSA/Weight-Height Verified Yes   Dose Calculations Verified Yes

## 2023-10-20 PROBLEM — Z17.1 MALIGNANT NEOPLASM OF LOWER-OUTER QUADRANT OF RIGHT BREAST OF FEMALE, ESTROGEN RECEPTOR NEGATIVE (MULTI): Status: RESOLVED | Noted: 2023-10-15 | Resolved: 2023-10-20

## 2023-10-20 PROBLEM — M25.551 RIGHT HIP PAIN: Status: ACTIVE | Noted: 2023-10-20

## 2023-10-20 PROBLEM — C50.511 MALIGNANT NEOPLASM OF LOWER-OUTER QUADRANT OF RIGHT BREAST OF FEMALE, ESTROGEN RECEPTOR NEGATIVE (MULTI): Status: RESOLVED | Noted: 2023-10-15 | Resolved: 2023-10-20

## 2023-10-20 NOTE — ASSESSMENT & PLAN NOTE
Unclear etiology. Seems muscular or related to ?tendon.  Referral to PT. If no improvement, will obtain MRI of the hip.

## 2023-10-20 NOTE — ASSESSMENT & PLAN NOTE
- S/p lumpectomy/SLNBx. She has healed well from surgery.  - Discussed role of adjuvant pembrolizumab, per KEYNOTE-522 trial. We reviewed potential toxicities. She received C1D1 after clinic today.  - Instructed to call with any side effects.  - Proceed with adjuvant RT, per Dr. Hernandez (Radiation Oncology).

## 2023-10-20 NOTE — PROGRESS NOTES
DIVISION OF MEDICAL ONCOLOGY    Patient ID: Jose Honeycutt is a 43 y.o. female.  MRN: [id number]  : 1980  The patient presents to clinic today for her history of right breast cancer.     Cancer Staging   Malignant neoplasm of female breast (CMS/HCC)  Staging form: Breast, AJCC 8th Edition  - Clinical stage from 2023: Stage IIB (cT1c, cN1(f), cM0, G3, ER-, WV-, HER2-) - Signed by Mango Hernandez MD on 10/11/2023  - Pathologic stage from 2023: No Stage Recommended (ypT0, pN0(sn), cM0, G3, ER-, WV-, HER2-) - Signed by Mango Hernandez MD on 10/11/2023    Diagnostic/Therapeutic History:  -1/10/23: Bilateral screening mammogram showed a 1.2 cm density in right breast, as well as a 7mm intramammary LN.  -23: Dx MG/US confirmed a 1.1 x 0.9 x 1.1 cm hypoechoic mass at 8:00, 6 cm FN. At 9:00, 6 cm FN, a 0.5 cm mass noted, possible an intramammary LN. The right axillary LN’s appeared normal.  -22: US-guided CNB of 8:00 mass showed IDC, grade 3, ER/WV-negative Her2-negative (1+ IHC). 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  -Genetic testing performed, negative for pathogenic mutations.  -Neoadjuvant chemoimmunotherapy (KEYNOTE-522 regimen) initiated 23: Pembrolizumab with carboplatin/paclitaxel, followed by doxorubicin/cyclophosphamide. Completed 23.  -23: Right lumpectomy/SLNBx performed. No residual carcinoma noted. 0/4 LN's (one sentinel node) involved. ypT0 ypN0.    History of Present Illness (HPI)/Interval History:  Jose Honeycutt presents today for routine FUV and initiation of adjuvant pembrolizumab.  She has been feeling well since her surgery. No breast-related complaints today.  No dyspnea, cough, rash, diarrhea.  Currently recovering from a cold. No fevers/chills.  She has noted some muscular pain near right groin. No trauma to the area.  Stretching seems to help. Interested in PT.    Review of Systems:  14-point ROS otherwise negative,  "as per HPI/Interval History.    Past Medical History:   Diagnosis Date    Anxiety     Bacterial vaginitis 03/21/2023    Breast cancer (CMS/[facility])     Herpes simplex vulvovaginitis 03/21/2023    Vaginal discharge 03/21/2023    Viral illness 03/21/2023     Patient Active Problem List   Diagnosis    Malignant neoplasm of female breast (CMS/HCC)    Right hip pain        Past Surgical History:   Procedure Laterality Date    BREAST LUMPECTOMY Right        Social History     Socioeconomic History    Marital status:      Spouse name: None    Number of children: None    Years of education: None    Highest education level: None   Occupational History    None   Tobacco Use    Smoking status: Never    Smokeless tobacco: Never   Vaping Use    Vaping Use: Never used   Substance and Sexual Activity    Alcohol use: Never    Drug use: Never    Sexual activity: None   Other Topics Concern    None   Social History Narrative    None     Social Determinants of Health     Financial Resource Strain: Not on file   Food Insecurity: Not on file   Transportation Needs: Not on file   Physical Activity: Not on file   Stress: Not on file   Social Connections: Not on file   Intimate Partner Violence: Not on file   Housing Stability: Not on file       Family History   Problem Relation Name Age of Onset    Diabetes Father      Prostate cancer Father      Diabetes Other Grnadmother        Allergies:   Allergies   Allergen Reactions    Sulfamethoxazole Hives         Current Outpatient Medications:     amoxicillin-pot clavulanate (Augmentin) 875-125 mg tablet, Take 1 tablet (875 mg) by mouth 2 times a day for 10 days., Disp: 20 tablet, Rfl: 0    EPINEPHrine 0.3 mg/0.3 mL injection syringe, use as directed, Disp: , Rfl:     loratadine (Claritin) 10 mg tablet, Take 1 tablet (10 mg) by mouth once daily., Disp: , Rfl:      Objective    BSA: 1.76 meters squared  /79   Pulse 85   Temp 36.3 °C (97.3 °F) (Skin)   Resp 15   Ht 1.67 m (5' 5.75\")  "  Wt 66.9 kg (147 lb 7.8 oz)   SpO2 99%   BMI 23.99 kg/m²     ECOG Performance Status:  The ECOG performance scale today is ECO- Fully active, able to carry on all pre-disease performance w/o restriction.    Physical Exam  Constitutional:       General: She is not in acute distress.     Appearance: Normal appearance.   HENT:      Head: Normocephalic and atraumatic.   Eyes:      General: No scleral icterus.     Conjunctiva/sclera: Conjunctivae normal.   Cardiovascular:      Rate and Rhythm: Normal rate and regular rhythm.      Heart sounds: No murmur heard.  Abdominal:      General: There is no distension.      Palpations: Abdomen is soft.      Tenderness: There is no abdominal tenderness.   Musculoskeletal:         General: No swelling, tenderness or deformity. Normal range of motion.      Cervical back: Normal range of motion and neck supple. No rigidity.   Lymphadenopathy:      Cervical: No cervical adenopathy.   Skin:     General: Skin is warm and dry.      Coloration: Skin is not jaundiced.      Findings: No rash.   Neurological:      Mental Status: She is alert and oriented to person, place, and time.      Gait: Gait normal.   Psychiatric:         Mood and Affect: Mood normal.         Behavior: Behavior normal.         Thought Content: Thought content normal.         Judgment: Judgment normal.         Laboratory Data:  Lab Results   Component Value Date    WBC 5.1 10/19/2023    HGB 12.2 10/19/2023    HCT 37.1 10/19/2023    MCV 92 10/19/2023     10/19/2023       Chemistry    Lab Results   Component Value Date/Time     10/19/2023 1129    K 4.0 10/19/2023 1129     10/19/2023 1129    CO2 26 10/19/2023 1129    BUN 13 10/19/2023 1129    CREATININE 0.50 10/19/2023 1129    Lab Results   Component Value Date/Time    CALCIUM 9.2 10/19/2023 1129    ALKPHOS 96 10/19/2023 1129    AST 19 10/19/2023 1129    ALT 18 10/19/2023 1129    BILITOT 0.2 10/19/2023 1129        Lab Results   Component Value Date     TSH 1.35 10/19/2023        Radiology: N/A    Pathology:  Accession #: [id number]            Pathologist:                   PATRICIA MEDEROS MD   Date of Procedure:    8/25/2023   Date Received:          8/25/2023   Date Reported           8/30/2023   Submitting Physician:   JJ ELIZABETH MD   [facility]  Other External #                                                      FINAL DIAGNOSIS   A.  RIGHT BREAST LUMPECTOMY (LONG STITCH LATERAL, SHORT STITCH SUPERIOR):    -- NO RESIDUAL CARCINOMA PRESENT AFTER NEOADJUVANT CHEMOTHERAPY.   -- ONE LYMPH NODE, NEGATIVE FOR CARCINOMA (0/1).   -- PREVIOUS BIOPSY SITE CHANGES, TWO SITES.     B.  ADDITIONAL SUPERIOR BREAST TISSUE (R):    -- NEGATIVE FOR CARCINOMA.     C.  RIGHT SENTINEL LYMPH NODE # 1 (COUNT = 250):    -- ONE LYMPH NODE, NEGATIVE FOR CARCINOMA (0/1).     D.  ADDITIONAL LYMPH NODE:    -- TWO LYMPH NODES, NEGATIVE FOR CARCINOMA (0/2).          Assessment/Plan:  Jose Honeycutt is a 43 y.o. female with a history of right triple-negative breast cancer, who presents today for follow-up evaluation.    Malignant neoplasm of female breast (CMS/HCC)  - S/p lumpectomy/SLNBx. She has healed well from surgery.  - Discussed role of adjuvant pembrolizumab, per KEYNOTE-522 trial. We reviewed potential toxicities. She received C1D1 after clinic today.  - Instructed to call with any side effects.  - Proceed with adjuvant RT, per Dr. Hernandez (Radiation Oncology).    Right hip pain  Unclear etiology. Seems muscular or related to ?tendon.  Referral to PT. If no improvement, will obtain MRI of the hip.       Disposition.  - RTC in 6 weeks, treatment/labs in 3 weeks.  - She has our contact information and was instructed to call with concerns/questions in the interim.    Orders Placed This Encounter   Procedures    CBC and Auto Differential    Comprehensive metabolic panel    Hcg, Quantitative, Pregnancy    CBC and Auto Differential    Comprehensive metabolic  panel    Hcg, Quantitative, Pregnancy    Tsh With Reflex To Free T4 If Abnormal    Referral to Physical Therapy      Te Knight MD  Hematology and Medical Oncology  [facility]

## 2023-11-01 ENCOUNTER — APPOINTMENT (OUTPATIENT)
Dept: INTEGRATIVE MEDICINE | Facility: CLINIC | Age: 43
End: 2023-11-01
Payer: COMMERCIAL

## 2023-11-09 ENCOUNTER — HOSPITAL ENCOUNTER (OUTPATIENT)
Dept: RADIATION ONCOLOGY | Facility: CLINIC | Age: 43
Setting detail: RADIATION/ONCOLOGY SERIES
End: 2023-11-09
Payer: COMMERCIAL

## 2023-11-09 ENCOUNTER — INFUSION (OUTPATIENT)
Dept: HEMATOLOGY/ONCOLOGY | Facility: HOSPITAL | Age: 43
End: 2023-11-09
Payer: COMMERCIAL

## 2023-11-09 VITALS
DIASTOLIC BLOOD PRESSURE: 87 MMHG | HEART RATE: 89 BPM | SYSTOLIC BLOOD PRESSURE: 130 MMHG | RESPIRATION RATE: 18 BRPM | OXYGEN SATURATION: 100 % | BODY MASS INDEX: 24.35 KG/M2 | WEIGHT: 149.69 LBS | TEMPERATURE: 98.6 F

## 2023-11-09 DIAGNOSIS — C50.919 MALIGNANT NEOPLASM OF FEMALE BREAST, UNSPECIFIED ESTROGEN RECEPTOR STATUS, UNSPECIFIED LATERALITY, UNSPECIFIED SITE OF BREAST (MULTI): ICD-10-CM

## 2023-11-09 LAB
ALBUMIN SERPL BCP-MCNC: 4.2 G/DL (ref 3.4–5)
ALP SERPL-CCNC: 97 U/L (ref 33–110)
ALT SERPL W P-5'-P-CCNC: 16 U/L (ref 7–45)
ANION GAP SERPL CALC-SCNC: 11 MMOL/L (ref 10–20)
AST SERPL W P-5'-P-CCNC: 17 U/L (ref 9–39)
B-HCG SERPL-ACNC: <3 MIU/ML
BASOPHILS # BLD AUTO: 0.02 X10*3/UL (ref 0–0.1)
BASOPHILS NFR BLD AUTO: 0.5 %
BILIRUB SERPL-MCNC: 0.2 MG/DL (ref 0–1.2)
BUN SERPL-MCNC: 15 MG/DL (ref 6–23)
CALCIUM SERPL-MCNC: 9 MG/DL (ref 8.6–10.3)
CHLORIDE SERPL-SCNC: 105 MMOL/L (ref 98–107)
CO2 SERPL-SCNC: 27 MMOL/L (ref 21–32)
CREAT SERPL-MCNC: 0.58 MG/DL (ref 0.5–1.05)
EOSINOPHIL # BLD AUTO: 0.14 X10*3/UL (ref 0–0.7)
EOSINOPHIL NFR BLD AUTO: 3.7 %
ERYTHROCYTE [DISTWIDTH] IN BLOOD BY AUTOMATED COUNT: 12.5 % (ref 11.5–14.5)
GFR SERPL CREATININE-BSD FRML MDRD: >90 ML/MIN/1.73M*2
GLUCOSE SERPL-MCNC: 101 MG/DL (ref 74–99)
HCT VFR BLD AUTO: 35.5 % (ref 36–46)
HGB BLD-MCNC: 11.9 G/DL (ref 12–16)
IMM GRANULOCYTES # BLD AUTO: 0 X10*3/UL (ref 0–0.7)
IMM GRANULOCYTES NFR BLD AUTO: 0 % (ref 0–0.9)
LYMPHOCYTES # BLD AUTO: 1.57 X10*3/UL (ref 1.2–4.8)
LYMPHOCYTES NFR BLD AUTO: 41.4 %
MCH RBC QN AUTO: 30 PG (ref 26–34)
MCHC RBC AUTO-ENTMCNC: 33.5 G/DL (ref 32–36)
MCV RBC AUTO: 89 FL (ref 80–100)
MONOCYTES # BLD AUTO: 0.48 X10*3/UL (ref 0.1–1)
MONOCYTES NFR BLD AUTO: 12.7 %
NEUTROPHILS # BLD AUTO: 1.58 X10*3/UL (ref 1.2–7.7)
NEUTROPHILS NFR BLD AUTO: 41.7 %
NRBC BLD-RTO: 0 /100 WBCS (ref 0–0)
PLATELET # BLD AUTO: 160 X10*3/UL (ref 150–450)
POTASSIUM SERPL-SCNC: 4 MMOL/L (ref 3.5–5.3)
PROT SERPL-MCNC: 6.9 G/DL (ref 6.4–8.2)
RBC # BLD AUTO: 3.97 X10*6/UL (ref 4–5.2)
SODIUM SERPL-SCNC: 139 MMOL/L (ref 136–145)
WBC # BLD AUTO: 3.8 X10*3/UL (ref 4.4–11.3)

## 2023-11-09 PROCEDURE — 84702 CHORIONIC GONADOTROPIN TEST: CPT

## 2023-11-09 PROCEDURE — 85025 COMPLETE CBC W/AUTO DIFF WBC: CPT

## 2023-11-09 PROCEDURE — 96413 CHEMO IV INFUSION 1 HR: CPT

## 2023-11-09 PROCEDURE — 2500000004 HC RX 250 GENERAL PHARMACY W/ HCPCS (ALT 636 FOR OP/ED): Performed by: PHARMACIST

## 2023-11-09 PROCEDURE — 2500000004 HC RX 250 GENERAL PHARMACY W/ HCPCS (ALT 636 FOR OP/ED): Performed by: INTERNAL MEDICINE

## 2023-11-09 PROCEDURE — 80053 COMPREHEN METABOLIC PANEL: CPT

## 2023-11-09 RX ORDER — HEPARIN SODIUM,PORCINE/PF 10 UNIT/ML
50 SYRINGE (ML) INTRAVENOUS AS NEEDED
Status: CANCELLED | OUTPATIENT
Start: 2023-11-09

## 2023-11-09 RX ORDER — PROCHLORPERAZINE EDISYLATE 5 MG/ML
10 INJECTION INTRAMUSCULAR; INTRAVENOUS EVERY 6 HOURS PRN
Status: DISCONTINUED | OUTPATIENT
Start: 2023-11-09 | End: 2023-11-09 | Stop reason: HOSPADM

## 2023-11-09 RX ORDER — HEPARIN 100 UNIT/ML
500 SYRINGE INTRAVENOUS AS NEEDED
Status: CANCELLED | OUTPATIENT
Start: 2023-11-09

## 2023-11-09 RX ORDER — DIPHENHYDRAMINE HYDROCHLORIDE 50 MG/ML
50 INJECTION INTRAMUSCULAR; INTRAVENOUS AS NEEDED
Status: DISCONTINUED | OUTPATIENT
Start: 2023-11-09 | End: 2023-11-09 | Stop reason: HOSPADM

## 2023-11-09 RX ORDER — FAMOTIDINE 10 MG/ML
20 INJECTION INTRAVENOUS ONCE AS NEEDED
Status: DISCONTINUED | OUTPATIENT
Start: 2023-11-09 | End: 2023-11-09 | Stop reason: HOSPADM

## 2023-11-09 RX ORDER — PROCHLORPERAZINE MALEATE 10 MG
10 TABLET ORAL EVERY 6 HOURS PRN
Status: DISCONTINUED | OUTPATIENT
Start: 2023-11-09 | End: 2023-11-09 | Stop reason: HOSPADM

## 2023-11-09 RX ORDER — ALBUTEROL SULFATE 0.83 MG/ML
3 SOLUTION RESPIRATORY (INHALATION) AS NEEDED
Status: DISCONTINUED | OUTPATIENT
Start: 2023-11-09 | End: 2023-11-09 | Stop reason: HOSPADM

## 2023-11-09 RX ORDER — HEPARIN 100 UNIT/ML
500 SYRINGE INTRAVENOUS AS NEEDED
Status: DISCONTINUED | OUTPATIENT
Start: 2023-11-09 | End: 2023-11-09 | Stop reason: HOSPADM

## 2023-11-09 RX ORDER — EPINEPHRINE 0.3 MG/.3ML
0.3 INJECTION SUBCUTANEOUS EVERY 5 MIN PRN
Status: DISCONTINUED | OUTPATIENT
Start: 2023-11-09 | End: 2023-11-09 | Stop reason: HOSPADM

## 2023-11-09 RX ORDER — HEPARIN SODIUM,PORCINE/PF 10 UNIT/ML
50 SYRINGE (ML) INTRAVENOUS AS NEEDED
Status: DISCONTINUED | OUTPATIENT
Start: 2023-11-09 | End: 2023-11-09 | Stop reason: HOSPADM

## 2023-11-09 RX ADMIN — HEPARIN 500 UNITS: 100 SYRINGE at 16:20

## 2023-11-09 RX ADMIN — SODIUM CHLORIDE 200 MG: 9 INJECTION, SOLUTION INTRAVENOUS at 15:35

## 2023-11-09 ASSESSMENT — PAIN SCALES - GENERAL: PAINLEVEL: 0-NO PAIN

## 2023-11-09 NOTE — SIGNIFICANT EVENT
11/09/23 1500   Prechemo Checklist   Has the patient been in the hospital, ED, or urgent care since last date of service No   Chemo/Immuno Consent Signed Yes   Protocol/Indications Verified Yes   Confirmed to previous date/time of medication Yes   Compared to previous dose Yes   All medications are dated accurately Yes   Pregnancy Test Negative Yes   Parameters Met Yes  (OK to treat without lab results per MD)   BSA/Weight-Height Verified Yes   Dose Calculations Verified Yes

## 2023-11-10 ENCOUNTER — EVALUATION (OUTPATIENT)
Dept: PHYSICAL THERAPY | Facility: CLINIC | Age: 43
End: 2023-11-10
Payer: COMMERCIAL

## 2023-11-10 DIAGNOSIS — M25.551 RIGHT HIP PAIN: ICD-10-CM

## 2023-11-10 PROCEDURE — 97110 THERAPEUTIC EXERCISES: CPT | Mod: GP | Performed by: PHYSICAL THERAPIST

## 2023-11-10 PROCEDURE — 97161 PT EVAL LOW COMPLEX 20 MIN: CPT | Mod: GP | Performed by: PHYSICAL THERAPIST

## 2023-11-10 ASSESSMENT — ENCOUNTER SYMPTOMS
DEPRESSION: 0
OCCASIONAL FEELINGS OF UNSTEADINESS: 0
LOSS OF SENSATION IN FEET: 0

## 2023-11-10 NOTE — LETTER
November 10, 2023    Carmela Horn, PT  [id number]   [facility]  [facility] [id number]    Patient: Jose Honeycutt   YOB: 1980   Date of Visit: 11/10/2023       Dear Te Knight Md  [address] [place] [address]    The attached plan of care is being sent to you because your patient’s medical reimbursement requires that you certify the plan of care. Your signature is required to allow uninterrupted insurance coverage.      You may indicate your approval by signing below and faxing this form back to us at Dept Fax: [phone].    Please call Dept: [phone] with any questions or concerns.    Thank you for this referral,        Carmela Horn, PT  [facility] [address] [facility]  [address] [facility] [address]    Payer: Payor: [employer] / Plan: AET [employer] / Product Type: *No Product type* /                                                                         Date:     Dear Carmela Horn, PT,     Re: Ms. Jose Honeycutt, MRN:[id number]    I certify that I have reviewed the attached plan of care and it is medically necessary for Ms. Jose Honeycutt (1980) who is under my care.          ______________________________________                    _________________  Provider name and credentials                                           Date and time                                                                                           Plan of Care 11/10/23   Effective from: 11/10/2023  Effective to: 11/10/2023    Plan ID: [id number]            Participants as of Finalize on 11/10/2023    Name Type Comments Contact Info    Te Knight MD Consulting Physician  [phone]    Carmela Horn PT Physical Therapist  [phone]       Last Plan Note     Author: Carmela Horn PT Status: Incomplete Last edited: 11/10/2023  9:00 AM       Physical Therapy    Physical Therapy Evaluation    Patient Name: Jose Honeycutt  MRN:  [id number]  Today's Date: 11/10/2023  Time Calculation  Start Time: 0900  Stop Time: 0947  Time Calculation (min): 47 min    Assessment:  The patient is a 43 y.o. female with chief complaint of approximately 2.5 months of anterior right hip pain.  She presents with good spinal and hip ROM, but right hip flexion and external rotation is painful.  She demonstrates weakness in right hip flexors, abductors, external rotation and extensors and deep abdominals.  She is tender with palpation of the tendonous junction at right ASIS, most likely the rectus femoris and tensor fasciae latae.  Pain and these physical impairments are limiting her functional mobility and ability to participate in recreational activities. She will likely benefit from skilled physical therapy to address these impairments, reduce pain and optimize her functional mobility in order to return to [facility].         Plan  Treatment/Interventions: Cryotherapy, Education/ Instruction, Hot pack, Manual therapy, Neuromuscular re-education, Taping techniques, Therapeutic activities, Therapeutic exercises  PT Plan: Skilled PT  PT Frequency: Other (Comment) (1-2 x per week x 4-6 weeks)  Duration: 4-6 weeks  Onset Date: 10/19/23  Rehab Potential: Excellent  Plan of Care Agreement: Patient    Current Problem  1. Right hip pain  Referral to Physical Therapy    Follow Up In Physical Therapy          Subjective    General  Reason for Referral: Right hip pain  Referred By: Dr. Te Knight  General: Patient report that she has had anterior right hip pain for 2.5. months.  Begin walking and it seems to have helped.  Walks up to 3 miles on TM.  It hurts to lift the right hip.  Has gained 15 pounds since January.  Also has pain on dorsum of  right foot along the 4th metatarsal that has been going on longer.  Just hurts when she flexes her toes - feels like a bruise.  Has not had hip dx imaging. And has not had right foot xrays.  Denies any numbness or tingling.  Used to do a  lot of yoga before CA, hips feel tighter. Right hip hurts to flex and to go up the stairs.  Took Ibuprofen, which didn't do anything. Not treating with ice and heat. Desk chair - is not ideal.  Using a kitchen chair, which has made it better.        Precautions: Currently undergoing treatment for breast CA.  She will begin radiation treatment on 11/13/23.  She states she has no restrictions with exercise.    STEADI score: 1     Pain: rates pain at 2/10 currently in right hip (ASIS) at rest. At worst, rates pain at 6/10.       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  Is , has 2 kids 4 and 8 y.o. enjoys being active, Lives in a Multistory home.     Prior Function Per Pt/Caregiver Report: Prior to 2.5 months ago, she had no right hip pain and no difficulty lifting right leg.       Objective  Lumbar AROM:  Flex:WNL  Ext:WNL  SB R:WNL  SB L:WNL  Rot R:WNL  Rot L:WNL    Hip AROM/PROM: WNL in all planes bilaterally, pain with passive and active right hip flexion      LE/Lumbopelvic Strength:  Hip Flex: R:4, P! L:4+  Hip ER: R:5 L:5  Hip IR: R:4P! L:5, mild discomfort  Knee Ext: R:5, mild P! L:5  Ankle DF: R:5 L:5  Bilateral Bridge:100%  Unilateral Bridge: R:difficult L:difficult  Rectus Abdominis: NT  Transverse Abdominis/Sahrmann Level:0-0.5  Hip Abduction: R:4- L:5  Hip Extension: R:4- (in prone with knee straight and knee bent) L:5  Knee Flexion: R:    5           L:5    Flexibility:  Hamstrings: R: Fair+     L:Good-  Quads: R: Good-       L:Good -  Piriformis: R: Good    L: Good  Itband Fair on the right, Fair on the left   Hip Flexors: R:  Good-       L: Good-    Posture: right shoulder lower, scap lower, pelvic level, right lumbar paraspinals hypertrophied, In Patel Forward bend: left rib prominence, right lumbar prominence (hx of scoliosis)        Palpation: Tender to palpation of right tendonous junction at right ASIS, no pain with palpation of  Iliopsoas.     Special Tests: Pain with active SLR, improves and is easier with deep abdominal engagement.     Gait:Patient ambulates independently without an a.d.      Balance:Steadi score 1                 Outcome Measures:  LEFS: 78/80    OP EDUCATION:  Pt was educated on PT clinical findings, anatomy as it relates to symptoms and diagnosis, PT POC and initial HEP with oral and written instruction provided.  The patient performed all exercises today to insure correct performance.      Access Code: [email or web address]  URL: [email or web address]  Date: 11/10/2023  Prepared by: Carmela Horn    Exercises  - Hooklying Transversus Abdominis Palpation  - 1 x daily - 7 x weekly - 1 sets - 15 reps - 5 seconds hold  - Supine Bridge  - 1-2 x daily - 7 x weekly - 2 sets - 10 reps - 5 seconds hold  - Supine Quadriceps Stretch with Strap on Table (Mirrored)  - 1-2 x daily - 7 x weekly - 1 sets - 3 reps - 30 seconds hold           Goals:  Active       PT Problem       Patient will be independent and adherent to HEP to enhance functional progress and long term management of condition.        Start:  11/10/23    Expected End:  12/22/23            Patient will improve LEFS score by 2 points indicating clinically important positive change in function and participation.        Start:  11/10/23    Expected End:  12/22/23            Patient will increase LE and trunk strength by 1/3 MMT grade to facilitate muscle performance necessary for performance of stair negotiation and recreational activities.        Start:  11/10/23    Expected End:  12/22/23            Patient will demonstrate full and pain-free right hip A/PROM       Start:  11/10/23    Expected End:  12/22/23            Patient will be able to ascend stairs and hike recreationally without restriction of right hip pain.        Start:  11/10/23    Expected End:  12/22/23            Patient will report reduction of pain in right anterior hip to 0/10 with all all  activities.        Start:  11/10/23    Expected End:  12/22/23                                Current Participants as of 11/10/2023    Name Type Comments Contact Info    Te Knight MD Consulting Physician  [phone]    Signature pending    Carmela Horn PT Physical Therapist  [phone]    Signature pending

## 2023-11-10 NOTE — Clinical Note
November 10, 2023     Patient: Jose Honeycutt   YOB: 1980   Date of Visit: 11/10/2023       To Whom It May Concern:    It is my medical opinion that Jose Honeycutt {Work release (duty restriction):85043}.    If you have any questions or concerns, please don't hesitate to call.         Sincerely,        Carmela Horn, PT    CC: No Recipients

## 2023-11-10 NOTE — Clinical Note
November 10, 2023     Patient: Jose Honeycutt   YOB: 1980   Date of Visit: 11/10/2023       To Whom it May Concern:    Jose Honeycutt was seen in my clinic on 11/10/2023. She {Return to school/sport:40287}.    If you have any questions or concerns, please don't hesitate to call.         Sincerely,          Carmela Horn, PT        CC: No Recipients

## 2023-11-10 NOTE — PROGRESS NOTES
Physical Therapy    Physical Therapy Evaluation    Patient Name: Jose Honeycutt  MRN: [id number]  Today's Date: 11/10/2023  Time Calculation  Start Time: 0900  Stop Time: 0947  Time Calculation (min): 47 min    Assessment:  The patient is a 43 y.o. female with chief complaint of approximately 2.5 months of anterior right hip pain.  She presents with good spinal and hip ROM, but right hip flexion and external rotation is painful.  She demonstrates weakness in right hip flexors, abductors, external rotation and extensors and deep abdominals.  She is tender with palpation of the tendonous junction at right ASIS, most likely the rectus femoris and tensor fasciae latae.  Pain and these physical impairments are limiting her functional mobility and ability to participate in recreational activities. She will likely benefit from skilled physical therapy to address these impairments, reduce pain and optimize her functional mobility in order to return to [facility].         Plan  Treatment/Interventions: Cryotherapy, Education/ Instruction, Hot pack, Manual therapy, Neuromuscular re-education, Taping techniques, Therapeutic activities, Therapeutic exercises  PT Plan: Skilled PT  PT Frequency: Other (Comment) (1-2 x per week x 4-6 weeks)  Duration: 4-6 weeks  Onset Date: 10/19/23  Rehab Potential: Excellent  Plan of Care Agreement: Patient    Current Problem  1. Right hip pain  Referral to Physical Therapy    Follow Up In Physical Therapy          Subjective     General  Reason for Referral: Right hip pain  Referred By: Dr. Te Knight  General: Patient report that she has had anterior right hip pain for 2.5. months.  Begin walking and it seems to have helped.  Walks up to 3 miles on TM.  It hurts to lift the right hip.  Has gained 15 pounds since January.  Also has pain on dorsum of  right foot along the 4th metatarsal that has been going on longer.  Just hurts when she flexes her toes - feels like a bruise.  Has not had hip dx  imaging. And has not had right foot xrays.  Denies any numbness or tingling.  Used to do a lot of yoga before CA, hips feel tighter. Right hip hurts to flex and to go up the stairs.  Took Ibuprofen, which didn't do anything. Not treating with ice and heat. Desk chair - is not ideal.  Using a kitchen chair, which has made it better.        Precautions: Currently undergoing treatment for breast CA.  She will begin radiation treatment on 11/13/23.  She states she has no restrictions with exercise.    STEADI score: 1     Pain: rates pain at 2/10 currently in right hip (ASIS) at rest. At worst, rates pain at 6/10.       Home Living: Works in [employer] and oversees [employer] in [facility] in [place] [facility], works FT.  Is , has 2 kids 4 and 8 y.o. enjoys being active, Lives in a Multistory home.     Prior Function Per Pt/Caregiver Report: Prior to 2.5 months ago, she had no right hip pain and no difficulty lifting right leg.       Objective   Lumbar AROM:  Flex:WNL  Ext:WNL  SB R:WNL  SB L:WNL  Rot R:WNL  Rot L:WNL    Hip AROM/PROM: WNL in all planes bilaterally, pain with passive and active right hip flexion      LE/Lumbopelvic Strength:  Hip Flex: R:4, P! L:4+  Hip ER: R:5 L:5  Hip IR: R:4P! L:5, mild discomfort  Knee Ext: R:5, mild P! L:5  Ankle DF: R:5 L:5  Bilateral Bridge:100%  Unilateral Bridge: R:difficult L:difficult  Rectus Abdominis: NT  Transverse Abdominis/Sahrmann Level:0-0.5  Hip Abduction: R:4- L:5  Hip Extension: R:4- (in prone with knee straight and knee bent) L:5  Knee Flexion: R:    5           L:5    Flexibility:  Hamstrings: R: Fair+     L:Good-  Quads: R: Good-       L:Good -  Piriformis: R: Good    L: Good  Itband Fair on the right, Fair on the left   Hip Flexors: R:  Good-       L: Good-    Posture: right shoulder lower, scap lower, pelvic level, right lumbar paraspinals hypertrophied, In Patel Forward bend: left rib prominence, right lumbar prominence (hx of scoliosis)        Palpation:  Tender to palpation of right tendonous junction at right ASIS, no pain with palpation of Iliopsoas.     Special Tests: Pain with active SLR, improves and is easier with deep abdominal engagement.     Gait:Patient ambulates independently without an a.d.      Balance:Steadi score 1                 Outcome Measures:  LEFS: 78/80    OP EDUCATION:  Pt was educated on PT clinical findings, anatomy as it relates to symptoms and diagnosis, PT POC and initial HEP with oral and written instruction provided.  The patient performed all exercises today to insure correct performance.      Access Code: [email or web address]  URL: [email or web address]  Date: 11/10/2023  Prepared by: Carmela Horn    Exercises  - Hooklying Transversus Abdominis Palpation  - 1 x daily - 7 x weekly - 1 sets - 15 reps - 5 seconds hold  - Supine Bridge  - 1-2 x daily - 7 x weekly - 2 sets - 10 reps - 5 seconds hold  - Supine Quadriceps Stretch with Strap on Table (Mirrored)  - 1-2 x daily - 7 x weekly - 1 sets - 3 reps - 30 seconds hold           Goals:  Active       PT Problem       Patient will be independent and adherent to HEP to enhance functional progress and long term management of condition.        Start:  11/10/23    Expected End:  12/22/23            Patient will improve LEFS score by 2 points indicating clinically important positive change in function and participation.        Start:  11/10/23    Expected End:  12/22/23            Patient will increase LE and trunk strength by 1/3 MMT grade to facilitate muscle performance necessary for performance of stair negotiation and recreational activities.        Start:  11/10/23    Expected End:  12/22/23            Patient will demonstrate full and pain-free right hip A/PROM       Start:  11/10/23    Expected End:  12/22/23            Patient will be able to ascend stairs and hike recreationally without restriction of right hip pain.        Start:  11/10/23    Expected End:  12/22/23             Patient will report reduction of pain in right anterior hip to 0/10 with all all activities.        Start:  11/10/23    Expected End:  12/22/23

## 2023-11-13 ENCOUNTER — HOSPITAL ENCOUNTER (OUTPATIENT)
Dept: RADIATION ONCOLOGY | Facility: CLINIC | Age: 43
Setting detail: RADIATION/ONCOLOGY SERIES
Discharge: HOME | End: 2023-11-13
Payer: COMMERCIAL

## 2023-11-13 DIAGNOSIS — C77.3 SECONDARY AND UNSPECIFIED MALIGNANT NEOPLASM OF AXILLA AND UPPER LIMB LYMPH NODES (MULTI): ICD-10-CM

## 2023-11-13 DIAGNOSIS — Z51.0 ENCOUNTER FOR ANTINEOPLASTIC RADIATION THERAPY: ICD-10-CM

## 2023-11-13 DIAGNOSIS — C50.511 MALIGNANT NEOPLASM OF LOWER-OUTER QUADRANT OF RIGHT FEMALE BREAST (MULTI): ICD-10-CM

## 2023-11-13 LAB
RAD ONC MSQ ACTUAL FRACTIONS DELIVERED: 1
RAD ONC MSQ ACTUAL SESSION DELIVERED DOSE: 266 CGRAY
RAD ONC MSQ ACTUAL TOTAL DOSE: 266 CGRAY
RAD ONC MSQ ELAPSED DAYS: 0
RAD ONC MSQ LAST DATE: NORMAL
RAD ONC MSQ PRESCRIBED FRACTIONAL DOSE: 266 CGRAY
RAD ONC MSQ PRESCRIBED NUMBER OF FRACTIONS: 16
RAD ONC MSQ PRESCRIBED TECHNIQUE: NORMAL
RAD ONC MSQ PRESCRIBED TOTAL DOSE: 4256 CGRAY
RAD ONC MSQ PRESCRIPTION PATTERN COMMENT: NORMAL
RAD ONC MSQ START DATE: NORMAL
RAD ONC MSQ TREATMENT COURSE NUMBER: 1
RAD ONC MSQ TREATMENT SITE: NORMAL

## 2023-11-13 PROCEDURE — 77014 CHG CT GUIDANCE RADIATION THERAPY FLDS PLACEMENT: CPT | Performed by: STUDENT IN AN ORGANIZED HEALTH CARE EDUCATION/TRAINING PROGRAM

## 2023-11-13 PROCEDURE — 77385 HC INTENSITY-MODULATED RADIATION THERAPY (IMRT), SIMPLE: CPT | Performed by: STUDENT IN AN ORGANIZED HEALTH CARE EDUCATION/TRAINING PROGRAM

## 2023-11-14 ENCOUNTER — HOSPITAL ENCOUNTER (OUTPATIENT)
Dept: RADIATION ONCOLOGY | Facility: CLINIC | Age: 43
Setting detail: RADIATION/ONCOLOGY SERIES
Discharge: HOME | End: 2023-11-14
Payer: COMMERCIAL

## 2023-11-14 DIAGNOSIS — C77.3 SECONDARY AND UNSPECIFIED MALIGNANT NEOPLASM OF AXILLA AND UPPER LIMB LYMPH NODES (MULTI): ICD-10-CM

## 2023-11-14 DIAGNOSIS — Z51.0 ENCOUNTER FOR ANTINEOPLASTIC RADIATION THERAPY: ICD-10-CM

## 2023-11-14 DIAGNOSIS — C50.511 MALIGNANT NEOPLASM OF LOWER-OUTER QUADRANT OF RIGHT FEMALE BREAST (MULTI): ICD-10-CM

## 2023-11-14 LAB
RAD ONC MSQ ACTUAL FRACTIONS DELIVERED: 2
RAD ONC MSQ ACTUAL SESSION DELIVERED DOSE: 266 CGRAY
RAD ONC MSQ ACTUAL TOTAL DOSE: 532 CGRAY
RAD ONC MSQ ELAPSED DAYS: 1
RAD ONC MSQ LAST DATE: NORMAL
RAD ONC MSQ PRESCRIBED FRACTIONAL DOSE: 266 CGRAY
RAD ONC MSQ PRESCRIBED NUMBER OF FRACTIONS: 16
RAD ONC MSQ PRESCRIBED TECHNIQUE: NORMAL
RAD ONC MSQ PRESCRIBED TOTAL DOSE: 4256 CGRAY
RAD ONC MSQ PRESCRIPTION PATTERN COMMENT: NORMAL
RAD ONC MSQ START DATE: NORMAL
RAD ONC MSQ TREATMENT COURSE NUMBER: 1
RAD ONC MSQ TREATMENT SITE: NORMAL

## 2023-11-14 PROCEDURE — 77014 CHG CT GUIDANCE RADIATION THERAPY FLDS PLACEMENT: CPT | Performed by: STUDENT IN AN ORGANIZED HEALTH CARE EDUCATION/TRAINING PROGRAM

## 2023-11-14 PROCEDURE — 77385 HC INTENSITY-MODULATED RADIATION THERAPY (IMRT), SIMPLE: CPT | Performed by: STUDENT IN AN ORGANIZED HEALTH CARE EDUCATION/TRAINING PROGRAM

## 2023-11-15 ENCOUNTER — HOSPITAL ENCOUNTER (OUTPATIENT)
Dept: RADIATION ONCOLOGY | Facility: CLINIC | Age: 43
Setting detail: RADIATION/ONCOLOGY SERIES
Discharge: HOME | End: 2023-11-15
Payer: COMMERCIAL

## 2023-11-15 ENCOUNTER — RADIATION ONCOLOGY OTV (OUTPATIENT)
Dept: RADIATION ONCOLOGY | Facility: CLINIC | Age: 43
End: 2023-11-15
Payer: COMMERCIAL

## 2023-11-15 VITALS
BODY MASS INDEX: 23.77 KG/M2 | HEART RATE: 97 BPM | SYSTOLIC BLOOD PRESSURE: 128 MMHG | DIASTOLIC BLOOD PRESSURE: 90 MMHG | TEMPERATURE: 97.2 F | WEIGHT: 147.93 LBS | RESPIRATION RATE: 18 BRPM | HEIGHT: 66 IN | OXYGEN SATURATION: 97 %

## 2023-11-15 DIAGNOSIS — Z51.0 ENCOUNTER FOR ANTINEOPLASTIC RADIATION THERAPY: ICD-10-CM

## 2023-11-15 DIAGNOSIS — C77.3 SECONDARY AND UNSPECIFIED MALIGNANT NEOPLASM OF AXILLA AND UPPER LIMB LYMPH NODES (MULTI): ICD-10-CM

## 2023-11-15 DIAGNOSIS — C50.511 MALIGNANT NEOPLASM OF LOWER-OUTER QUADRANT OF RIGHT FEMALE BREAST (MULTI): ICD-10-CM

## 2023-11-15 LAB
RAD ONC MSQ ACTUAL FRACTIONS DELIVERED: 3
RAD ONC MSQ ACTUAL SESSION DELIVERED DOSE: 266 CGRAY
RAD ONC MSQ ACTUAL TOTAL DOSE: 798 CGRAY
RAD ONC MSQ ELAPSED DAYS: 2
RAD ONC MSQ LAST DATE: NORMAL
RAD ONC MSQ PRESCRIBED FRACTIONAL DOSE: 266 CGRAY
RAD ONC MSQ PRESCRIBED NUMBER OF FRACTIONS: 16
RAD ONC MSQ PRESCRIBED TECHNIQUE: NORMAL
RAD ONC MSQ PRESCRIBED TOTAL DOSE: 4256 CGRAY
RAD ONC MSQ PRESCRIPTION PATTERN COMMENT: NORMAL
RAD ONC MSQ START DATE: NORMAL
RAD ONC MSQ TREATMENT COURSE NUMBER: 1
RAD ONC MSQ TREATMENT SITE: NORMAL

## 2023-11-15 PROCEDURE — 77427 RADIATION TX MANAGEMENT X5: CPT | Performed by: STUDENT IN AN ORGANIZED HEALTH CARE EDUCATION/TRAINING PROGRAM

## 2023-11-15 PROCEDURE — 77385 HC INTENSITY-MODULATED RADIATION THERAPY (IMRT), SIMPLE: CPT | Performed by: STUDENT IN AN ORGANIZED HEALTH CARE EDUCATION/TRAINING PROGRAM

## 2023-11-15 PROCEDURE — 77014 CHG CT GUIDANCE RADIATION THERAPY FLDS PLACEMENT: CPT | Performed by: STUDENT IN AN ORGANIZED HEALTH CARE EDUCATION/TRAINING PROGRAM

## 2023-11-15 PROCEDURE — 77336 RADIATION PHYSICS CONSULT: CPT | Performed by: STUDENT IN AN ORGANIZED HEALTH CARE EDUCATION/TRAINING PROGRAM

## 2023-11-15 SDOH — ECONOMIC STABILITY: FOOD INSECURITY: WITHIN THE PAST 12 MONTHS, THE FOOD YOU BOUGHT JUST DIDN'T LAST AND YOU DIDN'T HAVE MONEY TO GET MORE.: NEVER TRUE

## 2023-11-15 SDOH — ECONOMIC STABILITY: FOOD INSECURITY: WITHIN THE PAST 12 MONTHS, YOU WORRIED THAT YOUR FOOD WOULD RUN OUT BEFORE YOU GOT MONEY TO BUY MORE.: NEVER TRUE

## 2023-11-15 ASSESSMENT — LIFESTYLE VARIABLES
AUDIT-C TOTAL SCORE: 1
SKIP TO QUESTIONS 9-10: 1
HOW OFTEN DO YOU HAVE A DRINK CONTAINING ALCOHOL: MONTHLY OR LESS
HOW MANY STANDARD DRINKS CONTAINING ALCOHOL DO YOU HAVE ON A TYPICAL DAY: 1 OR 2
HOW OFTEN DO YOU HAVE SIX OR MORE DRINKS ON ONE OCCASION: NEVER

## 2023-11-15 ASSESSMENT — PAIN SCALES - GENERAL: PAINLEVEL: 0-NO PAIN

## 2023-11-15 ASSESSMENT — SOCIAL DETERMINANTS OF HEALTH (SDOH): IN THE PAST 12 MONTHS, HAS THE ELECTRIC, GAS, OIL, OR WATER COMPANY THREATENED TO SHUT OFF SERVICE IN YOUR HOME?: NO

## 2023-11-15 ASSESSMENT — ENCOUNTER SYMPTOMS
DEPRESSION: 0
OCCASIONAL FEELINGS OF UNSTEADINESS: 0
LOSS OF SENSATION IN FEET: 0

## 2023-11-15 NOTE — PROGRESS NOTES
"Radiation Oncology On Treatment Visit    Patient Name:  Jose Honeycutt  MRN:  [id number]  :  1980    Referring Provider: Rod Tate MD  Primary Care Provider: JACKELYN Talavera  Care Team: Patient Care Team:  JACKELYN Talavera as PCP - General (Family Medicine)  Te Knight MD as Consulting Physician (Hematology and Oncology)  Sushant Saldana MD as Consulting Physician (Hematology and Oncology)  Joseph Fuchs MD as Consulting Physician (Hematology and Oncology)    Date of Service: 11/15/2023     Diagnosis:   Specialty Problems          Radiation Oncology Problems    Malignant neoplasm of female breast (CMS/HCC)         Treatment Summary:  Radiation Treatments       Active   Rt breast (Started on 2023)   Most recent fraction: 266 cGy given on 11/15/2023   Total given: 798 cGy / 4,256 cGy  (3 of 16 fractions)   Elapsed Days: 2   Technique: VMAT                   SUBJECTIVE: Energy fine, working out.  Skin fine.  No esophagitis.      OBJECTIVE:   Vital Signs:  /90 (BP Location: Right arm, Patient Position: Sitting)   Pulse 97   Temp 36.2 °C (97.2 °F) (Tympanic)   Resp 18   Ht 1.67 m (5' 5.75\")   Wt 67.1 kg (147 lb 14.9 oz)   SpO2 97%   BMI 24.06 kg/m²    Pain Scale: The patient's current pain level was assessed.  They report currently having a pain of 0 out of 10.    Other Pertinent Findings:     Toxicity Assessment          11/15/2023    14:04   Toxicity Assessment   Treatment Site Breast   Anorexia Grade 0   Anxiety Grade 0   Dehydration Grade 0   Depression Grade 0   Dermatitis Radiation Grade 0   Diarrhea Grade 0   Fatigue Grade 0   Fibrosis Deep Connective Tissue Grade 0   Fracture Grade 0   Nausea Grade 0   Pain Grade 1       hip pain 4/10   Treatment Related Secondary Malignancy Grade 0   Tumor Pain Grade 0   Vomiting Grade 0   Abdominal Pain Grade 0   Dysphagia Grade 0   Esophagitis Grade 0   Gastric Hemorrhage Grade 0   Mucositis Oral Grade 0   Dry Mouth " Grade 0   Breast Infection Grade 0   Seroma Grade 0   Joint Range of Motion Decreased Grade 0   Joint Range of Motion Decreased Lumbar Spine Grade 0   Brachial Plexopathy Grade 0   Breast Atrophy Grade 0   Breast Pain Grade 0   Nipple Deformity Grade 0   Pneumonitis Grade 0   Edema Limbs Grade 0   Lymphedema Grade 0   Thromboembolic Event Grade 0   Hot Flashes Grade 0          Concurrent systemic therapy: Pembro    Labs:   WBC   Date Value Ref Range Status   11/09/2023 3.8 (L) 4.4 - 11.3 x10*3/uL Final   10/19/2023 5.1 4.4 - 11.3 x10*3/uL Final     Hemoglobin   Date Value Ref Range Status   11/09/2023 11.9 (L) 12.0 - 16.0 g/dL Final   10/19/2023 12.2 12.0 - 16.0 g/dL Final     Hematocrit   Date Value Ref Range Status   11/09/2023 35.5 (L) 36.0 - 46.0 % Final   10/19/2023 37.1 36.0 - 46.0 % Final     Neutrophils Absolute   Date Value Ref Range Status   11/09/2023 1.58 1.20 - 7.70 x10*3/uL Final     Comment:     Percent differential counts (%) should be interpreted in the context of the absolute cell counts (cells/uL).   10/19/2023 2.77 1.20 - 7.70 x10*3/uL Final     Comment:     Percent differential counts (%) should be interpreted in the context of the absolute cell counts (cells/uL).     Platelets   Date Value Ref Range Status   11/09/2023 160 150 - 450 x10*3/uL Final   10/19/2023 203 150 - 450 x10*3/uL Final     Alkaline Phosphatase   Date Value Ref Range Status   11/09/2023 97 33 - 110 U/L Final   10/19/2023 96 33 - 110 U/L Final     AST   Date Value Ref Range Status   11/09/2023 17 9 - 39 U/L Final   10/19/2023 19 9 - 39 U/L Final     ALT   Date Value Ref Range Status   11/09/2023 16 7 - 45 U/L Final     Comment:     Patients treated with Sulfasalazine may generate falsely decreased results for ALT.   10/19/2023 18 7 - 45 U/L Final     Comment:     Patients treated with Sulfasalazine may generate falsely decreased results for ALT.     Bilirubin, Total   Date Value Ref Range Status   11/09/2023 0.2 0.0 - 1.2  mg/dL Final   10/19/2023 0.2 0.0 - 1.2 mg/dL Final     Glucose   Date Value Ref Range Status   11/09/2023 101 (H) 74 - 99 mg/dL Final   10/19/2023 98 74 - 99 mg/dL Final     Calcium   Date Value Ref Range Status   11/09/2023 9.0 8.6 - 10.3 mg/dL Final   10/19/2023 9.2 8.6 - 10.3 mg/dL Final     Sodium   Date Value Ref Range Status   11/09/2023 139 136 - 145 mmol/L Final   10/19/2023 139 136 - 145 mmol/L Final     Potassium   Date Value Ref Range Status   11/09/2023 4.0 3.5 - 5.3 mmol/L Final   10/19/2023 4.0 3.5 - 5.3 mmol/L Final     Bicarbonate   Date Value Ref Range Status   11/09/2023 27 21 - 32 mmol/L Final   10/19/2023 26 21 - 32 mmol/L Final     Chloride   Date Value Ref Range Status   11/09/2023 105 98 - 107 mmol/L Final   10/19/2023 105 98 - 107 mmol/L Final     Urea Nitrogen   Date Value Ref Range Status   11/09/2023 15 6 - 23 mg/dL Final   10/19/2023 13 6 - 23 mg/dL Final     Creatinine   Date Value Ref Range Status   11/09/2023 0.58 0.50 - 1.05 mg/dL Final         Exam: Grade 0 dermatitis of the right breast.       Assessment / Plan:  The patient is tolerating radiation therapy as anticipated.  Continue per current treatment plan.    Therapies: Using aquaphor. Rest as needed.         Side effects reviewed with patient. Images, chart and labs reviewed.    Mango Hernandez MD

## 2023-11-16 ENCOUNTER — TREATMENT (OUTPATIENT)
Dept: PHYSICAL THERAPY | Facility: CLINIC | Age: 43
End: 2023-11-16
Payer: COMMERCIAL

## 2023-11-16 ENCOUNTER — HOSPITAL ENCOUNTER (OUTPATIENT)
Dept: RADIATION ONCOLOGY | Facility: CLINIC | Age: 43
Setting detail: RADIATION/ONCOLOGY SERIES
Discharge: HOME | End: 2023-11-16
Payer: COMMERCIAL

## 2023-11-16 DIAGNOSIS — Z51.0 ENCOUNTER FOR ANTINEOPLASTIC RADIATION THERAPY: ICD-10-CM

## 2023-11-16 DIAGNOSIS — C50.511 MALIGNANT NEOPLASM OF LOWER-OUTER QUADRANT OF RIGHT FEMALE BREAST (MULTI): ICD-10-CM

## 2023-11-16 DIAGNOSIS — C77.3 SECONDARY AND UNSPECIFIED MALIGNANT NEOPLASM OF AXILLA AND UPPER LIMB LYMPH NODES (MULTI): ICD-10-CM

## 2023-11-16 DIAGNOSIS — M25.551 RIGHT HIP PAIN: ICD-10-CM

## 2023-11-16 LAB
RAD ONC MSQ ACTUAL FRACTIONS DELIVERED: 4
RAD ONC MSQ ACTUAL SESSION DELIVERED DOSE: 266 CGRAY
RAD ONC MSQ ACTUAL TOTAL DOSE: 1064 CGRAY
RAD ONC MSQ ELAPSED DAYS: 3
RAD ONC MSQ LAST DATE: NORMAL
RAD ONC MSQ PRESCRIBED FRACTIONAL DOSE: 266 CGRAY
RAD ONC MSQ PRESCRIBED NUMBER OF FRACTIONS: 16
RAD ONC MSQ PRESCRIBED TECHNIQUE: NORMAL
RAD ONC MSQ PRESCRIBED TOTAL DOSE: 4256 CGRAY
RAD ONC MSQ PRESCRIPTION PATTERN COMMENT: NORMAL
RAD ONC MSQ START DATE: NORMAL
RAD ONC MSQ TREATMENT COURSE NUMBER: 1
RAD ONC MSQ TREATMENT SITE: NORMAL

## 2023-11-16 PROCEDURE — 97110 THERAPEUTIC EXERCISES: CPT | Mod: GP | Performed by: PHYSICAL THERAPIST

## 2023-11-16 PROCEDURE — 77014 CHG CT GUIDANCE RADIATION THERAPY FLDS PLACEMENT: CPT | Performed by: STUDENT IN AN ORGANIZED HEALTH CARE EDUCATION/TRAINING PROGRAM

## 2023-11-16 PROCEDURE — 77385 HC INTENSITY-MODULATED RADIATION THERAPY (IMRT), SIMPLE: CPT | Performed by: STUDENT IN AN ORGANIZED HEALTH CARE EDUCATION/TRAINING PROGRAM

## 2023-11-16 NOTE — PROGRESS NOTES
"Physical Therapy    Physical Therapy Treatment    Patient Name: Jose Honeycutt  MRN: [id number]  Today's Date: 11/21/2023 Date of service 11/16/2023       03:34-04:20  Assessment: The patient felt the most effective stretch was the standing TFL stretch, pinpointing her area of pain.  She had pain with sidelying hip abduction, so this exercise was ceased. Strengthening hip abductors will be important, so may need to strengthen in another position first.  The patient is interested in TDN, but must obtain clearance from oncology specialists first. She will continue to benefit from PT to reduce her hip pain.      Plan: Consider TDN to right TFL, but MUST obtain clearance from oncologist before proceeding (patient is currently receiving acupuncture), issue green t-band.       Current Problem  1. Right hip pain  Follow Up In Physical Therapy          General: Patient states that right hip is about the same, No worse.  No pain currently at rest.  She has been working on her HEP. Still has pain with right hip flexion.           Subjective    Precautions: currently undergoing treatment for breast CA, daily radiation treatments.           Pain: right anterior.lateral hip, 0/10 at rest       Objective             Treatments:  Therapeutic exercise:  TrA 1 x 10, 5\" hold.  TrA x with hip adduction 1 x 10, 5\"  TrA with bent knee fallout x 10  TrA with heel slide x 10 R/L  Sidelying quad stretch 3 x 30 seconds right  Hip flexor stretch with sb to the r/l on the steps, then in kneeling lunge position.  TFL stretch on wall - felt the most beneficial  TFL stretch in supine   Bridge with tband green around knees 2 x 10  Side lying hip abduction ceased due to pain     Manual therapy:  Patient denied any allergy to adhesives and verbally consented to application of therapeutic tape after risks and benefits explained.  She was educated on 3-day wear schedule, with encouragement to remove if the skin is itchy, irritated, or is ineffective.  " Safe removal procedures were explained using a slow rolling technique and use of oild to loosen adhesive if necessary.  The patient verbally expressed understanding of all of these points.  -Therapeutic tape applied to right TFL and along right ITBand to inhibit.          OP EDUCATION:       Goals:  Active       PT Problem       Patient will be independent and adherent to HEP to enhance functional progress and long term management of condition.        Start:  11/10/23    Expected End:  12/22/23            Patient will improve LEFS score by 2 points indicating clinically important positive change in function and participation.        Start:  11/10/23    Expected End:  12/22/23            Patient will increase LE and trunk strength by 1/3 MMT grade to facilitate muscle performance necessary for performance of stair negotiation and recreational activities.        Start:  11/10/23    Expected End:  12/22/23            Patient will demonstrate full and pain-free right hip A/PROM       Start:  11/10/23    Expected End:  12/22/23            Patient will be able to ascend stairs and hike recreationally without restriction of right hip pain.        Start:  11/10/23    Expected End:  12/22/23            Patient will report reduction of pain in right anterior hip to 0/10 with all all activities.        Start:  11/10/23    Expected End:  12/22/23

## 2023-11-17 ENCOUNTER — HOSPITAL ENCOUNTER (OUTPATIENT)
Dept: RADIATION ONCOLOGY | Facility: CLINIC | Age: 43
Setting detail: RADIATION/ONCOLOGY SERIES
Discharge: HOME | End: 2023-11-17
Payer: COMMERCIAL

## 2023-11-17 ENCOUNTER — APPOINTMENT (OUTPATIENT)
Dept: PHYSICAL THERAPY | Facility: CLINIC | Age: 43
End: 2023-11-17
Payer: COMMERCIAL

## 2023-11-17 DIAGNOSIS — Z51.0 ENCOUNTER FOR ANTINEOPLASTIC RADIATION THERAPY: ICD-10-CM

## 2023-11-17 DIAGNOSIS — C77.3 SECONDARY AND UNSPECIFIED MALIGNANT NEOPLASM OF AXILLA AND UPPER LIMB LYMPH NODES (MULTI): ICD-10-CM

## 2023-11-17 DIAGNOSIS — C50.511 MALIGNANT NEOPLASM OF LOWER-OUTER QUADRANT OF RIGHT FEMALE BREAST (MULTI): ICD-10-CM

## 2023-11-17 LAB
RAD ONC MSQ ACTUAL FRACTIONS DELIVERED: 5
RAD ONC MSQ ACTUAL SESSION DELIVERED DOSE: 266 CGRAY
RAD ONC MSQ ACTUAL TOTAL DOSE: 1330 CGRAY
RAD ONC MSQ ELAPSED DAYS: 4
RAD ONC MSQ LAST DATE: NORMAL
RAD ONC MSQ PRESCRIBED FRACTIONAL DOSE: 266 CGRAY
RAD ONC MSQ PRESCRIBED NUMBER OF FRACTIONS: 16
RAD ONC MSQ PRESCRIBED TECHNIQUE: NORMAL
RAD ONC MSQ PRESCRIBED TOTAL DOSE: 4256 CGRAY
RAD ONC MSQ PRESCRIPTION PATTERN COMMENT: NORMAL
RAD ONC MSQ START DATE: NORMAL
RAD ONC MSQ TREATMENT COURSE NUMBER: 1
RAD ONC MSQ TREATMENT SITE: NORMAL

## 2023-11-17 PROCEDURE — 77385 HC INTENSITY-MODULATED RADIATION THERAPY (IMRT), SIMPLE: CPT | Performed by: STUDENT IN AN ORGANIZED HEALTH CARE EDUCATION/TRAINING PROGRAM

## 2023-11-17 PROCEDURE — 77014 CHG CT GUIDANCE RADIATION THERAPY FLDS PLACEMENT: CPT | Performed by: STUDENT IN AN ORGANIZED HEALTH CARE EDUCATION/TRAINING PROGRAM

## 2023-11-19 ENCOUNTER — HOSPITAL ENCOUNTER (OUTPATIENT)
Dept: RADIATION ONCOLOGY | Facility: CLINIC | Age: 43
Setting detail: RADIATION/ONCOLOGY SERIES
Discharge: HOME | End: 2023-11-19
Payer: COMMERCIAL

## 2023-11-19 DIAGNOSIS — C77.3 SECONDARY AND UNSPECIFIED MALIGNANT NEOPLASM OF AXILLA AND UPPER LIMB LYMPH NODES (MULTI): ICD-10-CM

## 2023-11-19 DIAGNOSIS — Z51.0 ENCOUNTER FOR ANTINEOPLASTIC RADIATION THERAPY: ICD-10-CM

## 2023-11-19 DIAGNOSIS — C50.511 MALIGNANT NEOPLASM OF LOWER-OUTER QUADRANT OF RIGHT FEMALE BREAST (MULTI): ICD-10-CM

## 2023-11-19 LAB
RAD ONC MSQ ACTUAL FRACTIONS DELIVERED: 6
RAD ONC MSQ ACTUAL SESSION DELIVERED DOSE: 266 CGRAY
RAD ONC MSQ ACTUAL TOTAL DOSE: 1596 CGRAY
RAD ONC MSQ ELAPSED DAYS: 6
RAD ONC MSQ LAST DATE: NORMAL
RAD ONC MSQ PRESCRIBED FRACTIONAL DOSE: 266 CGRAY
RAD ONC MSQ PRESCRIBED NUMBER OF FRACTIONS: 16
RAD ONC MSQ PRESCRIBED TECHNIQUE: NORMAL
RAD ONC MSQ PRESCRIBED TOTAL DOSE: 4256 CGRAY
RAD ONC MSQ PRESCRIPTION PATTERN COMMENT: NORMAL
RAD ONC MSQ START DATE: NORMAL
RAD ONC MSQ TREATMENT COURSE NUMBER: 1
RAD ONC MSQ TREATMENT SITE: NORMAL

## 2023-11-19 PROCEDURE — 77385 HC INTENSITY-MODULATED RADIATION THERAPY (IMRT), SIMPLE: CPT | Performed by: STUDENT IN AN ORGANIZED HEALTH CARE EDUCATION/TRAINING PROGRAM

## 2023-11-19 PROCEDURE — 77014 CHG CT GUIDANCE RADIATION THERAPY FLDS PLACEMENT: CPT | Performed by: STUDENT IN AN ORGANIZED HEALTH CARE EDUCATION/TRAINING PROGRAM

## 2023-11-20 ENCOUNTER — APPOINTMENT (OUTPATIENT)
Dept: INTEGRATIVE MEDICINE | Facility: CLINIC | Age: 43
End: 2023-11-20
Payer: COMMERCIAL

## 2023-11-20 ENCOUNTER — ALLIED HEALTH (OUTPATIENT)
Dept: INTEGRATIVE MEDICINE | Facility: CLINIC | Age: 43
End: 2023-11-20
Payer: COMMERCIAL

## 2023-11-20 ENCOUNTER — HOSPITAL ENCOUNTER (OUTPATIENT)
Dept: RADIATION ONCOLOGY | Facility: CLINIC | Age: 43
Setting detail: RADIATION/ONCOLOGY SERIES
Discharge: HOME | End: 2023-11-20
Payer: COMMERCIAL

## 2023-11-20 DIAGNOSIS — C77.3 SECONDARY AND UNSPECIFIED MALIGNANT NEOPLASM OF AXILLA AND UPPER LIMB LYMPH NODES (MULTI): ICD-10-CM

## 2023-11-20 DIAGNOSIS — Z51.0 ENCOUNTER FOR ANTINEOPLASTIC RADIATION THERAPY: ICD-10-CM

## 2023-11-20 DIAGNOSIS — C50.511 MALIGNANT NEOPLASM OF LOWER-OUTER QUADRANT OF RIGHT FEMALE BREAST (MULTI): ICD-10-CM

## 2023-11-20 DIAGNOSIS — C50.511 MALIGNANT NEOPLASM OF LOWER-OUTER QUADRANT OF RIGHT BREAST OF FEMALE, ESTROGEN RECEPTOR NEGATIVE (MULTI): Primary | ICD-10-CM

## 2023-11-20 DIAGNOSIS — Z17.1 MALIGNANT NEOPLASM OF LOWER-OUTER QUADRANT OF RIGHT BREAST OF FEMALE, ESTROGEN RECEPTOR NEGATIVE (MULTI): Primary | ICD-10-CM

## 2023-11-20 LAB
RAD ONC MSQ ACTUAL FRACTIONS DELIVERED: 7
RAD ONC MSQ ACTUAL SESSION DELIVERED DOSE: 266 CGRAY
RAD ONC MSQ ACTUAL TOTAL DOSE: 1862 CGRAY
RAD ONC MSQ ELAPSED DAYS: 7
RAD ONC MSQ LAST DATE: NORMAL
RAD ONC MSQ PRESCRIBED FRACTIONAL DOSE: 266 CGRAY
RAD ONC MSQ PRESCRIBED NUMBER OF FRACTIONS: 16
RAD ONC MSQ PRESCRIBED TECHNIQUE: NORMAL
RAD ONC MSQ PRESCRIBED TOTAL DOSE: 4256 CGRAY
RAD ONC MSQ PRESCRIPTION PATTERN COMMENT: NORMAL
RAD ONC MSQ START DATE: NORMAL
RAD ONC MSQ TREATMENT COURSE NUMBER: 1
RAD ONC MSQ TREATMENT SITE: NORMAL

## 2023-11-20 PROCEDURE — 77014 CHG CT GUIDANCE RADIATION THERAPY FLDS PLACEMENT: CPT | Performed by: STUDENT IN AN ORGANIZED HEALTH CARE EDUCATION/TRAINING PROGRAM

## 2023-11-20 PROCEDURE — 99214 OFFICE O/P EST MOD 30 MIN: CPT | Performed by: HOSPITALIST

## 2023-11-20 PROCEDURE — 77385 HC INTENSITY-MODULATED RADIATION THERAPY (IMRT), SIMPLE: CPT | Performed by: STUDENT IN AN ORGANIZED HEALTH CARE EDUCATION/TRAINING PROGRAM

## 2023-11-20 NOTE — PROGRESS NOTES
Patient ID: Jose Honeycutt is a 43 y.o. female.  Referring Physician: No referring provider defined for this encounter.  Primary Care Provider: LEAH Talavera-CNP    CANCER HISTORY:   42 yo woman with R breast IDC, triple negative  Genetics negative  Patient has 2 children, and notes completion of family building.      PMH: SVT/Afib, anxiety, triple negative breast CA   Psych History: Post partum depression/anxiety following birth of son,   generalized anxiety at baseline, is actively doing talk therapy   Surg Hx: catheter ablation 2020 for SVT      Treatment History:   Neoadjuvant carboplatin, paclitaxel, pembrolizumab; followed by doxorubicin,   cyclophosphamide, pembrolizumab (per KEYNOTE-522 protocol):      C1D1 2/16/23 (pembrolizumab was received, but developed a grade 3 infusion   reaction to paclitaxel).   - Switched to Abraxane/carboplatin for week #2. No issues.   - Had possible reaction versus anxiety attack during week #3. Treatment was   held.   Plan to have re-evaluation with surgery and adjuvant AC/keytruda.   Last immunotherapy end of this month     now s/p surgery  Radiation - causing lots of fatigue, pain     History of Present Illness Consulted by: Dr. Knight  For: breast cancer     Started AC - c/b fatigue but o/w doing well - feeling good now overall  Bloating but o/w doing well, contd and trying to drink tea and eat smaller portions - improved with similase     Chief complaints and symptoms:  Managing chemo side effects without pharmaceuticals  Menstrual irreg/premature menopause - very limited right now - has some anxiety around periods     Anxiety and diff sleeping - improved significantly but cutting out caffeine, being active - doing well now  waking up in middle of night  improved anxiety and insomnia - ok now    Arthralgias - fingers, shoulders, hurts.  Started a few weeks ago, foot and hip for 3 months     More nausea now, drinking kathe tea     Hot flashes - improved, almost  gone     Fatigue - doing well, has for a few days after chemo, now done, still tired       GERD symptoms with chemo, now with bloating  Occas chest pains, burning feeling     Interested in: acupuncture, Chinese herbs, guided imagery, nutrition, stress management     Diet: Eating very well overall, cut out fast food/junk food  More plant based - contd  Doing well overall, has made food changes we suggested     PA: walking 2-3 times/week, using treadmill, not steady right now     Sleep:Diff staying asleep, racing thoughts usually middle of the night - now sleeping better  Doing morning meditation, sleep hygiene  Journals prior to sleeping  Sleeping well now, cut out caffeine  no diff falling asleep but diff staying asleep     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   On all of the above she noted not experiencing any PMS or anxiety. She is   currently only taking gerry root/borage oil and magnesium.      CoQ10 100 mg/day  Vit D3 1000 IU/day  Fish Oil  Gerry Root  Mag  claritin  Lorazepam as needed  Protonix   Consulted by: Dr. Knight  For: breast cancer     Started AC - c/b fatigue but o/w doing well - feeling good now overall  Bloating but o/w doing well, contd and trying to drink tea and eat smaller portions - improved with similase      ROS:  no ha, visual symptoms, hearing loss  no sob, chest pain, palp  ROS o/w non contributory, please see HPI    Objective    BSA: There is no height or weight on file to calculate BSA.  There were no vitals taken for this visit.    PHYSICAL EXAM:  NAD, awake/alert  HEENT, NCAT, OP clear, no oral lesions  CTA bilat  RRR no mgr  Abd  soft/nt/nd+bs  No c/c/e/ttp  Motor/sensory intact, CN 2-12 intact     RESULTS:  Lab Results   Component Value Date    WBC 3.8 (L) 11/09/2023    HGB 11.9 (L) 11/09/2023    HCT 35.5 (L) 11/09/2023     11/09/2023    CREATININE 0.58 11/09/2023    AST 17 11/09/2023     Assessment/Plan   Cancer Staging   Malignant neoplasm of female breast (CMS/HCC)  Staging form: Breast, AJCC 8th Edition  - Clinical stage from 1/19/2023: Stage IIB (cT1c, cN1(f), cM0, G3, ER-, IN-, HER2-) - Signed by Mango Hernandez MD on 10/11/2023  - Pathologic stage from 8/25/2023: No Stage Recommended (ypT0, pN0(sn), cM0, G3, ER-, IN-, HER2-) - Signed by Mango Hernandez MD on 10/11/2023    44 yo woman with triple negative breast cancer on neoadj carbo/abraxane/keytruda f/b surgery then AC/keytruda as plan.  Doing well on AC/pembro chemo      Breast cancer:  [employer] plant based diet  Limit sugar  Limit alcohol  Anticancer Lifestyle Program  Read: Anticancer Living    VIOME testing    Arthralgias - using turmeric in food  Consider motrin prn  IMO likely related to immunotherapy - does not need treatment now, ending immunotherapy end of the month, but if worsens then can discuss further with Dr. Knight.  Cont acupuncture  Arab 3  Yoga  Massage     Exercise 30 min/day 5 days a week, vary by balance, cardio and resistance training  Continue walking now  Some fatigue - consider American ginseng 2 grams/day     Supplements to consider:  Melatonin 1-3 mg at night 1 hr prior to sleep - not taking  Vitamin D3 6218-5441 IU/day  Omega 3 supplementation (nordic naturals)     Sleep - try to get over 7 hours/night  Limit TV or electronics at night  Limit caffeine intake and only to the morning  Get exposure to light in the morning if possible  Meditation and/or breathing exercises in the morning are helpful  5 min breathing exercises: Alternate Nostril Breathing and Derek Corby breathing  Continue walking middle of the day     Sleep hygiene - should try to  sleep by 10 pm  Limit TV or exposure to light at night including cell phones  Limit caffeine to AM only if needed (e.g. coffee)  Morning exposure to light, getting outside or bright white light in the morning  [place] Tea  Consider melatonin   Consider massage  Consider similase (integrative therapeutics) for bloating     Irreg periods - likely from chemo, f/b endocrine - not having now     Anxiety - has tried SSRI in past without relief. Manifested with sleep interruption and cardiac symptoms.  - Patient connected with [facility] for peer support   - Patient currently seeing therapist for talk therapy   - Referral made to onco-psychiatry to explore other medication management   Doing better overall, meditating in am  strategies -- maybe beta blocker propranolol/atenolol?   cognitive behavioral therapy - seeing her as well, has improved symptoms now     Hot Flashes: improved overall, some returning last few weeks  Acupuncture - have an appt - next month, symptom management clinic but seeing someone else for a few weeks, helping  Yoga is helpful as is stress management  Consider Acteane (Boiron, homeopathic)  Relazen (Swedish Herb)     F/u in symptom management     SYMPTOM MANAGEMENT:   Symptoms Managed:  Hot Flashes - resumed but decreased since last visit, a few a day now, less intense but just as freq     Nausea - improved     Fatigue - gone     Anxiety/Insomnia - intermittent, starting radiation soon     Pain - joint stiffness in knees/hips, continued now but overall improved  hip pain R and R foot pain nael after walking (not during)  now overall improved otherwise  increased activity     Bloating - eating anything, trying smaller portions, much improved     Natural Products utilized:  CoQ10 100 mg/day  Vit D3 1000 IU/day  Fish Oil  Danielle Root  Mag  claritin  Lorazepam as needed  Protonix   Similase     Referrals: Yoga  Massage     Recommendations:  Continue CBT  Suggest trying essential oils -  lavender or peppermint in diffuser for hot flashes  Acteane or relazen  Consider meds like cymbalta for pain and hot flashes o/w     Follow Up:  Symptom Management: Weekly    Thank you for consulting me in for this patient  Joseph Fuchs MD

## 2023-11-21 ENCOUNTER — HOSPITAL ENCOUNTER (OUTPATIENT)
Dept: RADIATION ONCOLOGY | Facility: CLINIC | Age: 43
Setting detail: RADIATION/ONCOLOGY SERIES
Discharge: HOME | End: 2023-11-21
Payer: COMMERCIAL

## 2023-11-21 ENCOUNTER — RADIATION ONCOLOGY OTV (OUTPATIENT)
Dept: RADIATION ONCOLOGY | Facility: CLINIC | Age: 43
End: 2023-11-21
Payer: COMMERCIAL

## 2023-11-21 VITALS
BODY MASS INDEX: 23.74 KG/M2 | SYSTOLIC BLOOD PRESSURE: 128 MMHG | HEART RATE: 74 BPM | DIASTOLIC BLOOD PRESSURE: 87 MMHG | HEIGHT: 66 IN | OXYGEN SATURATION: 96 % | TEMPERATURE: 97.3 F | WEIGHT: 147.71 LBS | RESPIRATION RATE: 18 BRPM

## 2023-11-21 DIAGNOSIS — C77.3 SECONDARY AND UNSPECIFIED MALIGNANT NEOPLASM OF AXILLA AND UPPER LIMB LYMPH NODES (MULTI): ICD-10-CM

## 2023-11-21 DIAGNOSIS — Z51.0 ENCOUNTER FOR ANTINEOPLASTIC RADIATION THERAPY: ICD-10-CM

## 2023-11-21 DIAGNOSIS — C50.511 MALIGNANT NEOPLASM OF LOWER-OUTER QUADRANT OF RIGHT FEMALE BREAST (MULTI): ICD-10-CM

## 2023-11-21 LAB
RAD ONC MSQ ACTUAL FRACTIONS DELIVERED: 8
RAD ONC MSQ ACTUAL SESSION DELIVERED DOSE: 266 CGRAY
RAD ONC MSQ ACTUAL TOTAL DOSE: 2128 CGRAY
RAD ONC MSQ ELAPSED DAYS: 8
RAD ONC MSQ LAST DATE: NORMAL
RAD ONC MSQ PRESCRIBED FRACTIONAL DOSE: 266 CGRAY
RAD ONC MSQ PRESCRIBED NUMBER OF FRACTIONS: 16
RAD ONC MSQ PRESCRIBED TECHNIQUE: NORMAL
RAD ONC MSQ PRESCRIBED TOTAL DOSE: 4256 CGRAY
RAD ONC MSQ PRESCRIPTION PATTERN COMMENT: NORMAL
RAD ONC MSQ START DATE: NORMAL
RAD ONC MSQ TREATMENT COURSE NUMBER: 1
RAD ONC MSQ TREATMENT SITE: NORMAL

## 2023-11-21 PROCEDURE — 77385 HC INTENSITY-MODULATED RADIATION THERAPY (IMRT), SIMPLE: CPT | Performed by: STUDENT IN AN ORGANIZED HEALTH CARE EDUCATION/TRAINING PROGRAM

## 2023-11-21 PROCEDURE — 77014 CHG CT GUIDANCE RADIATION THERAPY FLDS PLACEMENT: CPT | Performed by: STUDENT IN AN ORGANIZED HEALTH CARE EDUCATION/TRAINING PROGRAM

## 2023-11-21 PROCEDURE — 77427 RADIATION TX MANAGEMENT X5: CPT | Performed by: STUDENT IN AN ORGANIZED HEALTH CARE EDUCATION/TRAINING PROGRAM

## 2023-11-21 ASSESSMENT — COLUMBIA-SUICIDE SEVERITY RATING SCALE - C-SSRS
1. IN THE PAST MONTH, HAVE YOU WISHED YOU WERE DEAD OR WISHED YOU COULD GO TO SLEEP AND NOT WAKE UP?: NO
6. HAVE YOU EVER DONE ANYTHING, STARTED TO DO ANYTHING, OR PREPARED TO DO ANYTHING TO END YOUR LIFE?: NO
2. HAVE YOU ACTUALLY HAD ANY THOUGHTS OF KILLING YOURSELF?: NO

## 2023-11-21 ASSESSMENT — ENCOUNTER SYMPTOMS
LOSS OF SENSATION IN FEET: 0
OCCASIONAL FEELINGS OF UNSTEADINESS: 0
DEPRESSION: 0

## 2023-11-21 ASSESSMENT — PATIENT HEALTH QUESTIONNAIRE - PHQ9
2. FEELING DOWN, DEPRESSED OR HOPELESS: NOT AT ALL
1. LITTLE INTEREST OR PLEASURE IN DOING THINGS: NOT AT ALL
SUM OF ALL RESPONSES TO PHQ9 QUESTIONS 1 AND 2: 0

## 2023-11-21 ASSESSMENT — PAIN SCALES - GENERAL: PAINLEVEL: 5

## 2023-11-21 NOTE — PROGRESS NOTES
"Radiation Oncology On Treatment Visit    Patient Name:  Jose Honeycutt  MRN:  [id number]  :  1980    Referring Provider: No ref. provider found  Primary Care Provider: JACKELYN Talavera  Care Team: Patient Care Team:  JACKELYN Talavera as PCP - General (Family Medicine)  Te Knight MD as Consulting Physician (Hematology and Oncology)  Sushant Saldana MD as Consulting Physician (Hematology and Oncology)  Joseph Fuchs MD as Consulting Physician (Hematology and Oncology)    Date of Service: 2023     Diagnosis:   Specialty Problems          Radiation Oncology Problems    Malignant neoplasm of female breast (CMS/HCC)         Treatment Summary:  Radiation Treatments       Active   Rt breast (Started on 2023)   Most recent fraction: 266 cGy given on 2023   Total given: 2,128 cGy / 4,256 cGy  (8 of 16 fractions)   Elapsed Days: 8   Technique: VMAT                   SUBJECTIVE: Twinge pains, not taking OTCs. Mild fatigue, still working.      OBJECTIVE:   Vital Signs:  /87 (BP Location: Left arm, Patient Position: Sitting, BP Cuff Size: Adult)   Pulse 74   Temp 36.3 °C (97.3 °F) (Tympanic)   Resp 18   Ht 1.67 m (5' 5.75\")   Wt 67 kg (147 lb 11.3 oz)   SpO2 96%   BMI 24.02 kg/m²    Pain Scale: The patient's current pain level was assessed.  They report currently having a pain of 5 out of 10.    Other Pertinent Findings:     Toxicity Assessment          11/15/2023    14:04 2023    09:40   Toxicity Assessment   Treatment Site Breast Breast   Anorexia Grade 0 Grade 0   Anxiety Grade 0 Grade 0   Dehydration Grade 0 Grade 0   Depression Grade 0 Grade 0   Dermatitis Radiation Grade 0 Grade 0   Diarrhea Grade 0 Grade 0   Fatigue Grade 0 Grade 1       pushes through the day   Fibrosis Deep Connective Tissue Grade 0 Grade 0   Fracture Grade 0 Grade 0   Nausea Grade 0 Grade 1   Pain Grade 1       hip pain 4/10 Grade 1       body baseline aches   Treatment Related " Secondary Malignancy Grade 0 Grade 0   Tumor Pain Grade 0 Grade 0   Vomiting Grade 0 Grade 0   Abdominal Pain Grade 0    Dysphagia Grade 0    Esophagitis Grade 0    Gastric Hemorrhage Grade 0    Mucositis Oral Grade 0    Dry Mouth Grade 0 Grade 0   Breast Infection Grade 0 Grade 0   Seroma Grade 0 Grade 0   Joint Range of Motion Decreased Grade 0 Grade 0   Joint Range of Motion Decreased Lumbar Spine Grade 0 Grade 0   Brachial Plexopathy Grade 0 Grade 0   Breast Atrophy Grade 0 Grade 0   Breast Pain Grade 0 Grade 0   Nipple Deformity Grade 0 Grade 0   Pneumonitis Grade 0 Grade 0   Edema Limbs Grade 0 Grade 0   Lymphedema Grade 0 Grade 0   Thromboembolic Event Grade 0 Grade 0   Hot Flashes Grade 0 Grade 0          Concurrent systemic therapy: Pembro concurrently    Labs:   WBC   Date Value Ref Range Status   11/09/2023 3.8 (L) 4.4 - 11.3 x10*3/uL Final   10/19/2023 5.1 4.4 - 11.3 x10*3/uL Final     Hemoglobin   Date Value Ref Range Status   11/09/2023 11.9 (L) 12.0 - 16.0 g/dL Final   10/19/2023 12.2 12.0 - 16.0 g/dL Final     Hematocrit   Date Value Ref Range Status   11/09/2023 35.5 (L) 36.0 - 46.0 % Final   10/19/2023 37.1 36.0 - 46.0 % Final     Neutrophils Absolute   Date Value Ref Range Status   11/09/2023 1.58 1.20 - 7.70 x10*3/uL Final     Comment:     Percent differential counts (%) should be interpreted in the context of the absolute cell counts (cells/uL).   10/19/2023 2.77 1.20 - 7.70 x10*3/uL Final     Comment:     Percent differential counts (%) should be interpreted in the context of the absolute cell counts (cells/uL).     Platelets   Date Value Ref Range Status   11/09/2023 160 150 - 450 x10*3/uL Final   10/19/2023 203 150 - 450 x10*3/uL Final     Alkaline Phosphatase   Date Value Ref Range Status   11/09/2023 97 33 - 110 U/L Final   10/19/2023 96 33 - 110 U/L Final     AST   Date Value Ref Range Status   11/09/2023 17 9 - 39 U/L Final   10/19/2023 19 9 - 39 U/L Final     ALT   Date Value Ref Range  Status   11/09/2023 16 7 - 45 U/L Final     Comment:     Patients treated with Sulfasalazine may generate falsely decreased results for ALT.   10/19/2023 18 7 - 45 U/L Final     Comment:     Patients treated with Sulfasalazine may generate falsely decreased results for ALT.     Bilirubin, Total   Date Value Ref Range Status   11/09/2023 0.2 0.0 - 1.2 mg/dL Final   10/19/2023 0.2 0.0 - 1.2 mg/dL Final     Glucose   Date Value Ref Range Status   11/09/2023 101 (H) 74 - 99 mg/dL Final   10/19/2023 98 74 - 99 mg/dL Final     Calcium   Date Value Ref Range Status   11/09/2023 9.0 8.6 - 10.3 mg/dL Final   10/19/2023 9.2 8.6 - 10.3 mg/dL Final     Sodium   Date Value Ref Range Status   11/09/2023 139 136 - 145 mmol/L Final   10/19/2023 139 136 - 145 mmol/L Final     Potassium   Date Value Ref Range Status   11/09/2023 4.0 3.5 - 5.3 mmol/L Final   10/19/2023 4.0 3.5 - 5.3 mmol/L Final     Bicarbonate   Date Value Ref Range Status   11/09/2023 27 21 - 32 mmol/L Final   10/19/2023 26 21 - 32 mmol/L Final     Chloride   Date Value Ref Range Status   11/09/2023 105 98 - 107 mmol/L Final   10/19/2023 105 98 - 107 mmol/L Final     Urea Nitrogen   Date Value Ref Range Status   11/09/2023 15 6 - 23 mg/dL Final   10/19/2023 13 6 - 23 mg/dL Final     Creatinine   Date Value Ref Range Status   11/09/2023 0.58 0.50 - 1.05 mg/dL Final         Exam: Grade 1 dermatitis of the right breast.      Assessment / Plan:  The patient is tolerating radiation therapy as anticipated.  Continue per current treatment plan.       Therapies: Discussed use of udderly smooth and aquaphor for skin. Monitor fatigue.      Side effects reviewed with patient. Images, chart and labs reviewed.    Mango Hernandez MD

## 2023-11-22 ENCOUNTER — HOSPITAL ENCOUNTER (OUTPATIENT)
Dept: RADIATION ONCOLOGY | Facility: CLINIC | Age: 43
Setting detail: RADIATION/ONCOLOGY SERIES
Discharge: HOME | End: 2023-11-22
Payer: COMMERCIAL

## 2023-11-22 ENCOUNTER — APPOINTMENT (OUTPATIENT)
Dept: INTEGRATIVE MEDICINE | Facility: CLINIC | Age: 43
End: 2023-11-22
Payer: COMMERCIAL

## 2023-11-22 DIAGNOSIS — C50.511 MALIGNANT NEOPLASM OF LOWER-OUTER QUADRANT OF RIGHT FEMALE BREAST (MULTI): ICD-10-CM

## 2023-11-22 DIAGNOSIS — Z51.0 ENCOUNTER FOR ANTINEOPLASTIC RADIATION THERAPY: ICD-10-CM

## 2023-11-22 DIAGNOSIS — C77.3 SECONDARY AND UNSPECIFIED MALIGNANT NEOPLASM OF AXILLA AND UPPER LIMB LYMPH NODES (MULTI): ICD-10-CM

## 2023-11-22 LAB
RAD ONC MSQ ACTUAL FRACTIONS DELIVERED: 9
RAD ONC MSQ ACTUAL SESSION DELIVERED DOSE: 266 CGRAY
RAD ONC MSQ ACTUAL TOTAL DOSE: 2394 CGRAY
RAD ONC MSQ ELAPSED DAYS: 9
RAD ONC MSQ LAST DATE: NORMAL
RAD ONC MSQ PRESCRIBED FRACTIONAL DOSE: 266 CGRAY
RAD ONC MSQ PRESCRIBED NUMBER OF FRACTIONS: 16
RAD ONC MSQ PRESCRIBED TECHNIQUE: NORMAL
RAD ONC MSQ PRESCRIBED TOTAL DOSE: 4256 CGRAY
RAD ONC MSQ PRESCRIPTION PATTERN COMMENT: NORMAL
RAD ONC MSQ START DATE: NORMAL
RAD ONC MSQ TREATMENT COURSE NUMBER: 1
RAD ONC MSQ TREATMENT SITE: NORMAL

## 2023-11-22 PROCEDURE — 77385 HC INTENSITY-MODULATED RADIATION THERAPY (IMRT), SIMPLE: CPT | Performed by: STUDENT IN AN ORGANIZED HEALTH CARE EDUCATION/TRAINING PROGRAM

## 2023-11-22 PROCEDURE — 77014 CHG CT GUIDANCE RADIATION THERAPY FLDS PLACEMENT: CPT | Performed by: STUDENT IN AN ORGANIZED HEALTH CARE EDUCATION/TRAINING PROGRAM

## 2023-11-22 PROCEDURE — 77336 RADIATION PHYSICS CONSULT: CPT | Performed by: STUDENT IN AN ORGANIZED HEALTH CARE EDUCATION/TRAINING PROGRAM

## 2023-11-27 ENCOUNTER — HOSPITAL ENCOUNTER (OUTPATIENT)
Dept: RADIATION ONCOLOGY | Facility: CLINIC | Age: 43
Setting detail: RADIATION/ONCOLOGY SERIES
Discharge: HOME | End: 2023-11-27
Payer: COMMERCIAL

## 2023-11-27 DIAGNOSIS — C50.511 MALIGNANT NEOPLASM OF LOWER-OUTER QUADRANT OF RIGHT FEMALE BREAST (MULTI): ICD-10-CM

## 2023-11-27 DIAGNOSIS — C77.3 SECONDARY AND UNSPECIFIED MALIGNANT NEOPLASM OF AXILLA AND UPPER LIMB LYMPH NODES (MULTI): ICD-10-CM

## 2023-11-27 DIAGNOSIS — Z51.0 ENCOUNTER FOR ANTINEOPLASTIC RADIATION THERAPY: ICD-10-CM

## 2023-11-27 LAB
RAD ONC MSQ ACTUAL FRACTIONS DELIVERED: 10
RAD ONC MSQ ACTUAL SESSION DELIVERED DOSE: 266 CGRAY
RAD ONC MSQ ACTUAL TOTAL DOSE: 2660 CGRAY
RAD ONC MSQ ELAPSED DAYS: 14
RAD ONC MSQ LAST DATE: NORMAL
RAD ONC MSQ PRESCRIBED FRACTIONAL DOSE: 266 CGRAY
RAD ONC MSQ PRESCRIBED NUMBER OF FRACTIONS: 16
RAD ONC MSQ PRESCRIBED TECHNIQUE: NORMAL
RAD ONC MSQ PRESCRIBED TOTAL DOSE: 4256 CGRAY
RAD ONC MSQ PRESCRIPTION PATTERN COMMENT: NORMAL
RAD ONC MSQ START DATE: NORMAL
RAD ONC MSQ TREATMENT COURSE NUMBER: 1
RAD ONC MSQ TREATMENT SITE: NORMAL

## 2023-11-27 PROCEDURE — 77014 CHG CT GUIDANCE RADIATION THERAPY FLDS PLACEMENT: CPT | Performed by: STUDENT IN AN ORGANIZED HEALTH CARE EDUCATION/TRAINING PROGRAM

## 2023-11-27 PROCEDURE — 77385 HC INTENSITY-MODULATED RADIATION THERAPY (IMRT), SIMPLE: CPT | Performed by: STUDENT IN AN ORGANIZED HEALTH CARE EDUCATION/TRAINING PROGRAM

## 2023-11-28 ENCOUNTER — HOSPITAL ENCOUNTER (OUTPATIENT)
Dept: RADIATION ONCOLOGY | Facility: CLINIC | Age: 43
Setting detail: RADIATION/ONCOLOGY SERIES
Discharge: HOME | End: 2023-11-28
Payer: COMMERCIAL

## 2023-11-28 ENCOUNTER — APPOINTMENT (OUTPATIENT)
Dept: PHYSICAL THERAPY | Facility: CLINIC | Age: 43
End: 2023-11-28
Payer: COMMERCIAL

## 2023-11-28 DIAGNOSIS — C50.511 MALIGNANT NEOPLASM OF LOWER-OUTER QUADRANT OF RIGHT FEMALE BREAST (MULTI): ICD-10-CM

## 2023-11-28 DIAGNOSIS — Z51.0 ENCOUNTER FOR ANTINEOPLASTIC RADIATION THERAPY: ICD-10-CM

## 2023-11-28 DIAGNOSIS — C77.3 SECONDARY AND UNSPECIFIED MALIGNANT NEOPLASM OF AXILLA AND UPPER LIMB LYMPH NODES (MULTI): ICD-10-CM

## 2023-11-28 LAB
RAD ONC MSQ ACTUAL FRACTIONS DELIVERED: 11
RAD ONC MSQ ACTUAL SESSION DELIVERED DOSE: 266 CGRAY
RAD ONC MSQ ACTUAL TOTAL DOSE: 2926 CGRAY
RAD ONC MSQ ELAPSED DAYS: 15
RAD ONC MSQ LAST DATE: NORMAL
RAD ONC MSQ PRESCRIBED FRACTIONAL DOSE: 266 CGRAY
RAD ONC MSQ PRESCRIBED NUMBER OF FRACTIONS: 16
RAD ONC MSQ PRESCRIBED TECHNIQUE: NORMAL
RAD ONC MSQ PRESCRIBED TOTAL DOSE: 4256 CGRAY
RAD ONC MSQ PRESCRIPTION PATTERN COMMENT: NORMAL
RAD ONC MSQ START DATE: NORMAL
RAD ONC MSQ TREATMENT COURSE NUMBER: 1
RAD ONC MSQ TREATMENT SITE: NORMAL

## 2023-11-28 PROCEDURE — 77014 CHG CT GUIDANCE RADIATION THERAPY FLDS PLACEMENT: CPT | Performed by: STUDENT IN AN ORGANIZED HEALTH CARE EDUCATION/TRAINING PROGRAM

## 2023-11-28 PROCEDURE — 77385 HC INTENSITY-MODULATED RADIATION THERAPY (IMRT), SIMPLE: CPT | Performed by: STUDENT IN AN ORGANIZED HEALTH CARE EDUCATION/TRAINING PROGRAM

## 2023-11-29 ENCOUNTER — RADIATION ONCOLOGY OTV (OUTPATIENT)
Dept: RADIATION ONCOLOGY | Facility: CLINIC | Age: 43
End: 2023-11-29

## 2023-11-29 ENCOUNTER — APPOINTMENT (OUTPATIENT)
Dept: INTEGRATIVE MEDICINE | Facility: CLINIC | Age: 43
End: 2023-11-29
Payer: COMMERCIAL

## 2023-11-29 ENCOUNTER — HOSPITAL ENCOUNTER (OUTPATIENT)
Dept: RADIATION ONCOLOGY | Facility: CLINIC | Age: 43
Setting detail: RADIATION/ONCOLOGY SERIES
Discharge: HOME | End: 2023-11-29
Payer: COMMERCIAL

## 2023-11-29 ENCOUNTER — ALLIED HEALTH (OUTPATIENT)
Dept: INTEGRATIVE MEDICINE | Facility: CLINIC | Age: 43
End: 2023-11-29

## 2023-11-29 VITALS
TEMPERATURE: 97.5 F | DIASTOLIC BLOOD PRESSURE: 81 MMHG | HEART RATE: 93 BPM | SYSTOLIC BLOOD PRESSURE: 119 MMHG | RESPIRATION RATE: 18 BRPM | OXYGEN SATURATION: 99 % | HEIGHT: 66 IN | WEIGHT: 149.47 LBS | BODY MASS INDEX: 24.02 KG/M2

## 2023-11-29 DIAGNOSIS — C50.511 MALIGNANT NEOPLASM OF LOWER-OUTER QUADRANT OF RIGHT FEMALE BREAST (MULTI): ICD-10-CM

## 2023-11-29 DIAGNOSIS — C77.3 SECONDARY AND UNSPECIFIED MALIGNANT NEOPLASM OF AXILLA AND UPPER LIMB LYMPH NODES (MULTI): ICD-10-CM

## 2023-11-29 DIAGNOSIS — Z51.0 ENCOUNTER FOR ANTINEOPLASTIC RADIATION THERAPY: ICD-10-CM

## 2023-11-29 DIAGNOSIS — M25.50 JOINT PAIN: Primary | ICD-10-CM

## 2023-11-29 DIAGNOSIS — R63.0 POOR APPETITE: ICD-10-CM

## 2023-11-29 LAB
RAD ONC MSQ ACTUAL FRACTIONS DELIVERED: 12
RAD ONC MSQ ACTUAL SESSION DELIVERED DOSE: 266 CGRAY
RAD ONC MSQ ACTUAL TOTAL DOSE: 3192 CGRAY
RAD ONC MSQ ELAPSED DAYS: 16
RAD ONC MSQ LAST DATE: NORMAL
RAD ONC MSQ PRESCRIBED FRACTIONAL DOSE: 266 CGRAY
RAD ONC MSQ PRESCRIBED NUMBER OF FRACTIONS: 16
RAD ONC MSQ PRESCRIBED TECHNIQUE: NORMAL
RAD ONC MSQ PRESCRIBED TOTAL DOSE: 4256 CGRAY
RAD ONC MSQ PRESCRIPTION PATTERN COMMENT: NORMAL
RAD ONC MSQ START DATE: NORMAL
RAD ONC MSQ TREATMENT COURSE NUMBER: 1
RAD ONC MSQ TREATMENT SITE: NORMAL

## 2023-11-29 PROCEDURE — 77427 RADIATION TX MANAGEMENT X5: CPT | Performed by: STUDENT IN AN ORGANIZED HEALTH CARE EDUCATION/TRAINING PROGRAM

## 2023-11-29 PROCEDURE — 77385 HC INTENSITY-MODULATED RADIATION THERAPY (IMRT), SIMPLE: CPT | Performed by: STUDENT IN AN ORGANIZED HEALTH CARE EDUCATION/TRAINING PROGRAM

## 2023-11-29 PROCEDURE — 97139 UNLISTED THERAPEUTIC PX: CPT | Performed by: ACUPUNCTURIST

## 2023-11-29 PROCEDURE — 77336 RADIATION PHYSICS CONSULT: CPT | Performed by: STUDENT IN AN ORGANIZED HEALTH CARE EDUCATION/TRAINING PROGRAM

## 2023-11-29 PROCEDURE — 77014 CHG CT GUIDANCE RADIATION THERAPY FLDS PLACEMENT: CPT | Performed by: STUDENT IN AN ORGANIZED HEALTH CARE EDUCATION/TRAINING PROGRAM

## 2023-11-29 ASSESSMENT — PAIN SCALES - GENERAL: PAINLEVEL: 4

## 2023-11-29 ASSESSMENT — ENCOUNTER SYMPTOMS
LOSS OF SENSATION IN FEET: 0
DEPRESSION: 0
OCCASIONAL FEELINGS OF UNSTEADINESS: 0

## 2023-11-29 NOTE — PROGRESS NOTES
"Radiation Oncology On Treatment Visit    Patient Name:  Jose Honeycutt  MRN:  [id number]  :  1980    Referring Provider: Rod Tate MD  Primary Care Provider: JACKELYN Talavera  Care Team: Patient Care Team:  JACKELYN Talavera as PCP - General (Family Medicine)  Te Knight MD as Consulting Physician (Hematology and Oncology)  Sushant Saldana MD as Consulting Physician (Hematology and Oncology)  Joseph Fuchs MD as Consulting Physician (Hematology and Oncology)    Date of Service: 2023     Diagnosis:   Specialty Problems          Radiation Oncology Problems    Malignant neoplasm of female breast (CMS/HCC)         Treatment Summary:  Radiation Treatments       Active   Rt breast (Started on 2023)   Most recent fraction: 266 cGy given on 2023   Total given: 3,192 cGy / 4,256 cGy  (12 of 16 fractions)   Elapsed Days: 16   Technique: VMAT                   SUBJECTIVE: Fair energy. Planned pembro this week, may hold or stop depending on discussion with medical oncology. Arthritis pains, stable from immunotherapy. Using topical creams twice a day. Mild esophagitis.      OBJECTIVE:   Vital Signs:  /81 (BP Location: Left arm, Patient Position: Sitting, BP Cuff Size: Adult)   Pulse 93   Temp 36.4 °C (97.5 °F) (Tympanic)   Resp 18   Ht 1.67 m (5' 5.75\")   Wt 67.8 kg (149 lb 7.6 oz)   SpO2 99%   BMI 24.31 kg/m²    Pain Scale: The patient's current pain level was assessed.  They report currently having a pain of 0 out of 10.    Other Pertinent Findings:     Toxicity Assessment          11/15/2023    14:04 2023    09:40 2023    09:26   Toxicity Assessment   Treatment Site Breast Breast Breast   Anorexia Grade 0 Grade 0 Grade 0   Anxiety Grade 0 Grade 0 Grade 0   Dehydration Grade 0 Grade 0 Grade 0   Depression Grade 0 Grade 0 Grade 0   Dermatitis Radiation Grade 0 Grade 0 Grade 0       udderly smooth 2 times per day   Diarrhea Grade 0 Grade 0 Grade 0 "   Fatigue Grade 0 Grade 1       pushes through the day Grade 1   Fibrosis Deep Connective Tissue Grade 0 Grade 0 Grade 0   Fracture Grade 0 Grade 0 Grade 0   Nausea Grade 0 Grade 1 Grade 0   Pain Grade 1       hip pain 4/10 Grade 1       body baseline aches Grade 1       baseline body aches   Treatment Related Secondary Malignancy Grade 0 Grade 0 Grade 0   Tumor Pain Grade 0 Grade 0 Grade 0   Vomiting Grade 0 Grade 0 Grade 0   Abdominal Pain Grade 0  Grade 0   Dysphagia Grade 0  Grade 0   Esophagitis Grade 0  Grade 0   Gastric Hemorrhage Grade 0  Grade 0   Mucositis Oral Grade 0  Grade 0   Dry Mouth Grade 0 Grade 0 Grade 0   Breast Infection Grade 0 Grade 0 Grade 0   Seroma Grade 0 Grade 0 Grade 0   Joint Range of Motion Decreased Grade 0 Grade 0 Grade 0   Joint Range of Motion Decreased Lumbar Spine Grade 0 Grade 0 Grade 0   Brachial Plexopathy Grade 0 Grade 0 Grade 0   Breast Atrophy Grade 0 Grade 0 Grade 0   Breast Pain Grade 0 Grade 0 Grade 0   Nipple Deformity Grade 0 Grade 0 Grade 0   Pneumonitis Grade 0 Grade 0 Grade 0   Edema Limbs Grade 0 Grade 0 Grade 0   Lymphedema Grade 0 Grade 0 Grade 0   Thromboembolic Event Grade 0 Grade 0 Grade 0   Hot Flashes Grade 0 Grade 0 Grade 0          Concurrent systemic therapy: Pembro    Labs:   WBC   Date Value Ref Range Status   11/09/2023 3.8 (L) 4.4 - 11.3 x10*3/uL Final   10/19/2023 5.1 4.4 - 11.3 x10*3/uL Final     Hemoglobin   Date Value Ref Range Status   11/09/2023 11.9 (L) 12.0 - 16.0 g/dL Final   10/19/2023 12.2 12.0 - 16.0 g/dL Final     Hematocrit   Date Value Ref Range Status   11/09/2023 35.5 (L) 36.0 - 46.0 % Final   10/19/2023 37.1 36.0 - 46.0 % Final     Neutrophils Absolute   Date Value Ref Range Status   11/09/2023 1.58 1.20 - 7.70 x10*3/uL Final     Comment:     Percent differential counts (%) should be interpreted in the context of the absolute cell counts (cells/uL).   10/19/2023 2.77 1.20 - 7.70 x10*3/uL Final     Comment:     Percent differential  counts (%) should be interpreted in the context of the absolute cell counts (cells/uL).     Platelets   Date Value Ref Range Status   11/09/2023 160 150 - 450 x10*3/uL Final   10/19/2023 203 150 - 450 x10*3/uL Final     Alkaline Phosphatase   Date Value Ref Range Status   11/09/2023 97 33 - 110 U/L Final   10/19/2023 96 33 - 110 U/L Final     AST   Date Value Ref Range Status   11/09/2023 17 9 - 39 U/L Final   10/19/2023 19 9 - 39 U/L Final     ALT   Date Value Ref Range Status   11/09/2023 16 7 - 45 U/L Final     Comment:     Patients treated with Sulfasalazine may generate falsely decreased results for ALT.   10/19/2023 18 7 - 45 U/L Final     Comment:     Patients treated with Sulfasalazine may generate falsely decreased results for ALT.     Bilirubin, Total   Date Value Ref Range Status   11/09/2023 0.2 0.0 - 1.2 mg/dL Final   10/19/2023 0.2 0.0 - 1.2 mg/dL Final     Glucose   Date Value Ref Range Status   11/09/2023 101 (H) 74 - 99 mg/dL Final   10/19/2023 98 74 - 99 mg/dL Final     Calcium   Date Value Ref Range Status   11/09/2023 9.0 8.6 - 10.3 mg/dL Final   10/19/2023 9.2 8.6 - 10.3 mg/dL Final     Sodium   Date Value Ref Range Status   11/09/2023 139 136 - 145 mmol/L Final   10/19/2023 139 136 - 145 mmol/L Final     Potassium   Date Value Ref Range Status   11/09/2023 4.0 3.5 - 5.3 mmol/L Final   10/19/2023 4.0 3.5 - 5.3 mmol/L Final     Bicarbonate   Date Value Ref Range Status   11/09/2023 27 21 - 32 mmol/L Final   10/19/2023 26 21 - 32 mmol/L Final     Chloride   Date Value Ref Range Status   11/09/2023 105 98 - 107 mmol/L Final   10/19/2023 105 98 - 107 mmol/L Final     Urea Nitrogen   Date Value Ref Range Status   11/09/2023 15 6 - 23 mg/dL Final   10/19/2023 13 6 - 23 mg/dL Final     Creatinine   Date Value Ref Range Status   11/09/2023 0.58 0.50 - 1.05 mg/dL Final         Exam: Grade 1 radiation dermatitis of of the right breast.     Assessment / Plan:  The patient is tolerating radiation therapy as  anticipated.  Continue per current treatment plan.       Therapies: Continue topicals. Discussed diet modifications and cough drops for esophagitis.      Side effects reviewed with patient. Images, chart and labs reviewed.    Mango Hernandez MD

## 2023-11-29 NOTE — PROGRESS NOTES
Acupuncture Visit:     Subjective   Patient ID: Jose Honeycutt is a 43 y.o. female who presents for No chief complaint on file.  Currently receiving RT and Immunotherapy.  Experiencing joint pain inflammation and reduction in appetite.        Session Information  Is this acupuncture treatment being billed to the patient's insurance company: No  Visit Type: Follow-up visit         Review of Systems         Provider reviewed plan for the acupuncture session, precautions and contraindications. Patient/guardian/hospital staff has given consent to treat with full understanding of what to expect during the session. Before acupuncture began, provider explained to the patient to communicate at any time if the procedure was causing discomfort past their tolerance level. Patient agreed to advise acupuncturist. The acupuncturist counseled the patient on the risks of acupuncture treatment including pain, infection, bleeding, and no relief of pain. The patient was positioned comfortably. There was no evidence of infection at the site of needle insertions.    Objective   Physical Exam         Treatment Plan  Treatment Goals: Pain management, Appetite improvement    Acupuncture Treatment  Patient Position: Seated and reclining  Acupuncture Needling: Yes  Needle Guage: 40 guage /.16/ Red seirin  Body Points: With retention  Body Points - Bilateral: st qi x2, immune, k 7, 10, 27, sp 6  Needle Count In: 14  Needle Count Out: 14  Needle Retention Time (min): 20 minutes  Total Face to Face Time (min): 25 minutes              Assessment/Plan

## 2023-11-30 ENCOUNTER — HOSPITAL ENCOUNTER (OUTPATIENT)
Dept: RADIATION ONCOLOGY | Facility: CLINIC | Age: 43
Setting detail: RADIATION/ONCOLOGY SERIES
Discharge: HOME | End: 2023-11-30
Payer: COMMERCIAL

## 2023-11-30 ENCOUNTER — OFFICE VISIT (OUTPATIENT)
Dept: HEMATOLOGY/ONCOLOGY | Facility: HOSPITAL | Age: 43
End: 2023-11-30
Payer: COMMERCIAL

## 2023-11-30 ENCOUNTER — LAB (OUTPATIENT)
Dept: HEMATOLOGY/ONCOLOGY | Facility: HOSPITAL | Age: 43
End: 2023-11-30
Payer: COMMERCIAL

## 2023-11-30 ENCOUNTER — APPOINTMENT (OUTPATIENT)
Dept: RADIATION ONCOLOGY | Facility: CLINIC | Age: 43
End: 2023-11-30
Payer: COMMERCIAL

## 2023-11-30 ENCOUNTER — INFUSION (OUTPATIENT)
Dept: HEMATOLOGY/ONCOLOGY | Facility: HOSPITAL | Age: 43
End: 2023-11-30
Payer: COMMERCIAL

## 2023-11-30 VITALS
HEIGHT: 66 IN | RESPIRATION RATE: 16 BRPM | WEIGHT: 150.13 LBS | BODY MASS INDEX: 24.13 KG/M2 | OXYGEN SATURATION: 98 % | DIASTOLIC BLOOD PRESSURE: 78 MMHG | HEART RATE: 99 BPM | TEMPERATURE: 96.8 F | SYSTOLIC BLOOD PRESSURE: 125 MMHG

## 2023-11-30 DIAGNOSIS — C50.919 MALIGNANT NEOPLASM OF FEMALE BREAST, UNSPECIFIED ESTROGEN RECEPTOR STATUS, UNSPECIFIED LATERALITY, UNSPECIFIED SITE OF BREAST (MULTI): ICD-10-CM

## 2023-11-30 DIAGNOSIS — C77.3 SECONDARY AND UNSPECIFIED MALIGNANT NEOPLASM OF AXILLA AND UPPER LIMB LYMPH NODES (MULTI): ICD-10-CM

## 2023-11-30 DIAGNOSIS — C50.919 MALIGNANT NEOPLASM OF FEMALE BREAST, UNSPECIFIED ESTROGEN RECEPTOR STATUS, UNSPECIFIED LATERALITY, UNSPECIFIED SITE OF BREAST (MULTI): Primary | ICD-10-CM

## 2023-11-30 DIAGNOSIS — Z29.89 ENCOUNTER FOR IMMUNOTHERAPY: ICD-10-CM

## 2023-11-30 DIAGNOSIS — M25.50 ARTHRALGIA, UNSPECIFIED JOINT: ICD-10-CM

## 2023-11-30 DIAGNOSIS — C50.511 MALIGNANT NEOPLASM OF LOWER-OUTER QUADRANT OF RIGHT FEMALE BREAST (MULTI): ICD-10-CM

## 2023-11-30 DIAGNOSIS — Z51.0 ENCOUNTER FOR ANTINEOPLASTIC RADIATION THERAPY: ICD-10-CM

## 2023-11-30 LAB
ALBUMIN SERPL BCP-MCNC: 4.2 G/DL (ref 3.4–5)
ALP SERPL-CCNC: 90 U/L (ref 33–110)
ALT SERPL W P-5'-P-CCNC: 18 U/L (ref 7–45)
ANION GAP SERPL CALC-SCNC: 11 MMOL/L (ref 10–20)
AST SERPL W P-5'-P-CCNC: 18 U/L (ref 9–39)
B-HCG SERPL-ACNC: <3 MIU/ML
BASOPHILS # BLD AUTO: 0.01 X10*3/UL (ref 0–0.1)
BASOPHILS NFR BLD AUTO: 0.3 %
BILIRUB SERPL-MCNC: 0.2 MG/DL (ref 0–1.2)
BUN SERPL-MCNC: 16 MG/DL (ref 6–23)
CALCIUM SERPL-MCNC: 9.1 MG/DL (ref 8.6–10.3)
CHLORIDE SERPL-SCNC: 105 MMOL/L (ref 98–107)
CO2 SERPL-SCNC: 28 MMOL/L (ref 21–32)
CREAT SERPL-MCNC: 0.48 MG/DL (ref 0.5–1.05)
EOSINOPHIL # BLD AUTO: 0.08 X10*3/UL (ref 0–0.7)
EOSINOPHIL NFR BLD AUTO: 2.7 %
ERYTHROCYTE [DISTWIDTH] IN BLOOD BY AUTOMATED COUNT: 13.1 % (ref 11.5–14.5)
GFR SERPL CREATININE-BSD FRML MDRD: >90 ML/MIN/1.73M*2
GLUCOSE SERPL-MCNC: 107 MG/DL (ref 74–99)
HCT VFR BLD AUTO: 34.8 % (ref 36–46)
HGB BLD-MCNC: 11.6 G/DL (ref 12–16)
IMM GRANULOCYTES # BLD AUTO: 0.01 X10*3/UL (ref 0–0.7)
IMM GRANULOCYTES NFR BLD AUTO: 0.3 % (ref 0–0.9)
LYMPHOCYTES # BLD AUTO: 0.64 X10*3/UL (ref 1.2–4.8)
LYMPHOCYTES NFR BLD AUTO: 21.8 %
MCH RBC QN AUTO: 29.5 PG (ref 26–34)
MCHC RBC AUTO-ENTMCNC: 33.3 G/DL (ref 32–36)
MCV RBC AUTO: 89 FL (ref 80–100)
MONOCYTES # BLD AUTO: 0.33 X10*3/UL (ref 0.1–1)
MONOCYTES NFR BLD AUTO: 11.3 %
NEUTROPHILS # BLD AUTO: 1.86 X10*3/UL (ref 1.2–7.7)
NEUTROPHILS NFR BLD AUTO: 63.6 %
NRBC BLD-RTO: 0 /100 WBCS (ref 0–0)
PLATELET # BLD AUTO: 126 X10*3/UL (ref 150–450)
POTASSIUM SERPL-SCNC: 4.1 MMOL/L (ref 3.5–5.3)
PROT SERPL-MCNC: 6.6 G/DL (ref 6.4–8.2)
RAD ONC MSQ ACTUAL FRACTIONS DELIVERED: 13
RAD ONC MSQ ACTUAL SESSION DELIVERED DOSE: 266 CGRAY
RAD ONC MSQ ACTUAL TOTAL DOSE: 3458 CGRAY
RAD ONC MSQ ELAPSED DAYS: 17
RAD ONC MSQ LAST DATE: NORMAL
RAD ONC MSQ PRESCRIBED FRACTIONAL DOSE: 266 CGRAY
RAD ONC MSQ PRESCRIBED NUMBER OF FRACTIONS: 16
RAD ONC MSQ PRESCRIBED TECHNIQUE: NORMAL
RAD ONC MSQ PRESCRIBED TOTAL DOSE: 4256 CGRAY
RAD ONC MSQ PRESCRIPTION PATTERN COMMENT: NORMAL
RAD ONC MSQ START DATE: NORMAL
RAD ONC MSQ TREATMENT COURSE NUMBER: 1
RAD ONC MSQ TREATMENT SITE: NORMAL
RBC # BLD AUTO: 3.93 X10*6/UL (ref 4–5.2)
SODIUM SERPL-SCNC: 140 MMOL/L (ref 136–145)
TSH SERPL-ACNC: 0.82 MIU/L (ref 0.44–3.98)
WBC # BLD AUTO: 2.9 X10*3/UL (ref 4.4–11.3)

## 2023-11-30 PROCEDURE — 2500000004 HC RX 250 GENERAL PHARMACY W/ HCPCS (ALT 636 FOR OP/ED): Mod: JZ | Performed by: INTERNAL MEDICINE

## 2023-11-30 PROCEDURE — 1036F TOBACCO NON-USER: CPT | Performed by: INTERNAL MEDICINE

## 2023-11-30 PROCEDURE — 96523 IRRIG DRUG DELIVERY DEVICE: CPT

## 2023-11-30 PROCEDURE — 80053 COMPREHEN METABOLIC PANEL: CPT

## 2023-11-30 PROCEDURE — 85025 COMPLETE CBC W/AUTO DIFF WBC: CPT

## 2023-11-30 PROCEDURE — 77385 HC INTENSITY-MODULATED RADIATION THERAPY (IMRT), SIMPLE: CPT | Performed by: STUDENT IN AN ORGANIZED HEALTH CARE EDUCATION/TRAINING PROGRAM

## 2023-11-30 PROCEDURE — 99215 OFFICE O/P EST HI 40 MIN: CPT | Performed by: INTERNAL MEDICINE

## 2023-11-30 PROCEDURE — 84702 CHORIONIC GONADOTROPIN TEST: CPT

## 2023-11-30 PROCEDURE — 77014 CHG CT GUIDANCE RADIATION THERAPY FLDS PLACEMENT: CPT | Performed by: STUDENT IN AN ORGANIZED HEALTH CARE EDUCATION/TRAINING PROGRAM

## 2023-11-30 PROCEDURE — 96413 CHEMO IV INFUSION 1 HR: CPT

## 2023-11-30 PROCEDURE — 84443 ASSAY THYROID STIM HORMONE: CPT

## 2023-11-30 PROCEDURE — 2500000004 HC RX 250 GENERAL PHARMACY W/ HCPCS (ALT 636 FOR OP/ED)

## 2023-11-30 RX ORDER — HEPARIN SODIUM,PORCINE/PF 10 UNIT/ML
50 SYRINGE (ML) INTRAVENOUS AS NEEDED
Status: CANCELLED | OUTPATIENT
Start: 2023-11-30

## 2023-11-30 RX ORDER — PROCHLORPERAZINE MALEATE 10 MG
10 TABLET ORAL EVERY 6 HOURS PRN
Status: CANCELLED | OUTPATIENT
Start: 2023-12-28

## 2023-11-30 RX ORDER — PROCHLORPERAZINE MALEATE 10 MG
10 TABLET ORAL EVERY 6 HOURS PRN
Status: DISCONTINUED | OUTPATIENT
Start: 2023-11-30 | End: 2023-11-30 | Stop reason: HOSPADM

## 2023-11-30 RX ORDER — FAMOTIDINE 10 MG/ML
20 INJECTION INTRAVENOUS ONCE AS NEEDED
Status: CANCELLED | OUTPATIENT
Start: 2023-12-28

## 2023-11-30 RX ORDER — PROCHLORPERAZINE EDISYLATE 5 MG/ML
10 INJECTION INTRAMUSCULAR; INTRAVENOUS EVERY 6 HOURS PRN
Status: DISCONTINUED | OUTPATIENT
Start: 2023-11-30 | End: 2023-11-30 | Stop reason: HOSPADM

## 2023-11-30 RX ORDER — HEPARIN 100 UNIT/ML
500 SYRINGE INTRAVENOUS AS NEEDED
Status: CANCELLED | OUTPATIENT
Start: 2023-11-30

## 2023-11-30 RX ORDER — ALBUTEROL SULFATE 0.83 MG/ML
3 SOLUTION RESPIRATORY (INHALATION) AS NEEDED
Status: DISCONTINUED | OUTPATIENT
Start: 2023-11-30 | End: 2023-11-30 | Stop reason: HOSPADM

## 2023-11-30 RX ORDER — HEPARIN 100 UNIT/ML
SYRINGE INTRAVENOUS
Status: COMPLETED
Start: 2023-11-30 | End: 2023-11-30

## 2023-11-30 RX ORDER — DIPHENHYDRAMINE HYDROCHLORIDE 50 MG/ML
50 INJECTION INTRAMUSCULAR; INTRAVENOUS AS NEEDED
Status: CANCELLED | OUTPATIENT
Start: 2023-12-28

## 2023-11-30 RX ORDER — EPINEPHRINE 0.3 MG/.3ML
0.3 INJECTION SUBCUTANEOUS EVERY 5 MIN PRN
Status: CANCELLED | OUTPATIENT
Start: 2023-12-28

## 2023-11-30 RX ORDER — ALBUTEROL SULFATE 0.83 MG/ML
3 SOLUTION RESPIRATORY (INHALATION) AS NEEDED
Status: CANCELLED | OUTPATIENT
Start: 2023-12-28

## 2023-11-30 RX ORDER — DIPHENHYDRAMINE HYDROCHLORIDE 50 MG/ML
50 INJECTION INTRAMUSCULAR; INTRAVENOUS AS NEEDED
Status: DISCONTINUED | OUTPATIENT
Start: 2023-11-30 | End: 2023-11-30 | Stop reason: HOSPADM

## 2023-11-30 RX ORDER — FAMOTIDINE 10 MG/ML
20 INJECTION INTRAVENOUS ONCE AS NEEDED
Status: DISCONTINUED | OUTPATIENT
Start: 2023-11-30 | End: 2023-11-30 | Stop reason: HOSPADM

## 2023-11-30 RX ORDER — PROCHLORPERAZINE EDISYLATE 5 MG/ML
10 INJECTION INTRAMUSCULAR; INTRAVENOUS EVERY 6 HOURS PRN
Status: CANCELLED | OUTPATIENT
Start: 2023-12-28

## 2023-11-30 RX ORDER — EPINEPHRINE 0.3 MG/.3ML
0.3 INJECTION SUBCUTANEOUS EVERY 5 MIN PRN
Status: DISCONTINUED | OUTPATIENT
Start: 2023-11-30 | End: 2023-11-30 | Stop reason: HOSPADM

## 2023-11-30 RX ADMIN — SODIUM CHLORIDE 200 MG: 9 INJECTION, SOLUTION INTRAVENOUS at 13:57

## 2023-11-30 RX ADMIN — HEPARIN: 100 SYRINGE at 14:45

## 2023-11-30 ASSESSMENT — PAIN SCALES - GENERAL: PAINLEVEL: 4

## 2023-11-30 NOTE — PROGRESS NOTES
Patient ID: Jose Honeycutt is a 43 y.o. female.  MRN: [id number]  : 1980  The patient presents to clinic today for her history of breast cancer.    Cancer Staging   Malignant neoplasm of female breast (CMS/HCC)  Staging form: Breast, AJCC 8th Edition  - Clinical stage from 2023: Stage IIB (cT1c, cN1(f), cM0, G3, ER-, NM-, HER2-) - Signed by Mango Hernandez MD on 10/11/2023  - Pathologic stage from 2023: No Stage Recommended (ypT0, pN0(sn), cM0, G3, ER-, NM-, HER2-) - Signed by Mango Hernandez MD on 10/11/2023      Diagnostic/Therapeutic History:  -1/10/23: Bilateral screening mammogram showed a 1.2 cm density in right breast, as well as a 7mm intramammary LN.  -23: Dx MG/US confirmed a 1.1 x 0.9 x 1.1 cm hypoechoic mass at 8:00, 6 cm FN. At 9:00, 6 cm FN, a 0.5 cm mass noted, possible an intramammary LN. The right axillary LN’s appeared normal.  -22: US-guided CNB of 8:00 mass showed IDC, grade 3, ER/NM-negative Her2-negative (1+ IHC). 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  -Genetic testing performed, negative for pathogenic mutations.  -Neoadjuvant chemoimmunotherapy (KEYNOTE-522 regimen) initiated 23: Pembrolizumab with carboplatin/paclitaxel, followed by doxorubicin/cyclophosphamide. Completed 23.  -23: Right lumpectomy/SLNBx performed. No residual carcinoma noted. 0/4 LN's (one sentinel node) involved. ypT0 ypN0.  - Adjuvant Pembrolizumab -10/19/23   - Radiation therapy -Right whole breast radiation therapy and [place] irradiation 4256 cGy in 16 fraction with right tumor bed boost 1000 cGy in 4 fractions started 23     History of Present Illness (HPI)/Interval History:    Here for cycle 3 of adjuvant Pembrolizumab . Has been having worsening Rt hip pain as well a small joint pain involving her lt MCP joints and in her metatarsal joints too . She has not experienced these kinds of pain before , even with prior Pembro  when she was getting it with chemo in NAC setting . Over Thanks giving she took a dose of Ibuprofen and it helped immensely . She also plans to travel to [place] over [place]  and was asking if her 4th cycle of Pembro may be pushed back a week . She also did not want to continue with the rest of adjuvant Pembro after the 4th dose. Tolerating radiation treatments well. No skin burns , using utterly smooth cream which is helping.   Denied any rash , no diarrhea , no shortness of breath , minimal fatigue after radiation but not limiting her activities. Sees Dr. Fuchs [facility] , getting acupuncture , not on any supplements.   Review of Systems:  14-point ROS otherwise negative, as per HPI/Interval History.    Past Medical History:   Diagnosis Date    Anxiety     Bacterial vaginitis 03/21/2023    Breast cancer (CMS/HCC)     Herpes simplex vulvovaginitis 03/21/2023    Vaginal discharge 03/21/2023    Viral illness 03/21/2023     Patient Active Problem List   Diagnosis    Malignant neoplasm of female breast (CMS/HCC)    Right hip pain        Past Surgical History:   Procedure Laterality Date    BREAST LUMPECTOMY Right        Social History     Socioeconomic History    Marital status:      Spouse name: Not on file    Number of children: Not on file    Years of education: Not on file    Highest education level: Not on file   Occupational History    Not on file   Tobacco Use    Smoking status: Never    Smokeless tobacco: Never   Vaping Use    Vaping Use: Never used   Substance and Sexual Activity    Alcohol use: Never    Drug use: Never    Sexual activity: Not on file   Other Topics Concern    Not on file   Social History Narrative    Not on file     Social Determinants of Health     Financial Resource Strain: Not on file   Food Insecurity: No Food Insecurity (11/15/2023)    Hunger Vital Sign     Worried About Running Out of Food in the Last Year: Never true     Ran Out of Food in the Last Year: Never true  "  Transportation Needs: Not on file   Physical Activity: Not on file   Stress: Not on file   Social Connections: Not on file   Intimate Partner Violence: Not on file   Housing Stability: Not on file       Family History   Problem Relation Name Age of Onset    Diabetes Father      Prostate cancer Father      Diabetes Other Grnadmother        Allergies:   Allergies   Allergen Reactions    Sulfamethoxazole Hives         Current Outpatient Medications:     EPINEPHrine 0.3 mg/0.3 mL injection syringe, use as directed, Disp: , Rfl:     loratadine (Claritin) 10 mg tablet, Take 1 tablet (10 mg) by mouth once daily., Disp: , Rfl:      Objective    BSA: 1.78 meters squared  /78   Pulse 99   Temp 36 °C (96.8 °F) (Temporal)   Resp 16   Ht 1.67 m (5' 5.75\")   Wt 68.1 kg (150 lb 2.1 oz)   SpO2 98%   BMI 24.42 kg/m²     ECOG Score: 0- Fully active, able to carry on all pre-disease performance w/o restriction.    Physical Exam  Constitutional:       Appearance: She is not ill-appearing.   Eyes:      Conjunctiva/sclera: Conjunctivae normal.   Cardiovascular:      Rate and Rhythm: Normal rate and regular rhythm.      Pulses: Normal pulses.      Heart sounds: No murmur heard.  Pulmonary:      Effort: Pulmonary effort is normal. No respiratory distress.      Breath sounds: No wheezing.   Abdominal:      General: Bowel sounds are normal. There is no distension.      Palpations: Abdomen is soft.   Musculoskeletal:         General: No swelling or deformity.      Right lower leg: No edema.      Left lower leg: No edema.   Skin:     Findings: No lesion.   Neurological:      General: No focal deficit present.      Mental Status: She is alert.     Rt breast : Scar + , no redness     Laboratory Data:  Lab Results   Component Value Date    WBC 2.9 (L) 11/30/2023    HGB 11.6 (L) 11/30/2023    HCT 34.8 (L) 11/30/2023    MCV 89 11/30/2023     (L) 11/30/2023       Chemistry    Lab Results   Component Value Date/Time     " 11/30/2023 1145    K 4.1 11/30/2023 1145     11/30/2023 1145    CO2 28 11/30/2023 1145    BUN 16 11/30/2023 1145    CREATININE 0.48 (L) 11/30/2023 1145    Lab Results   Component Value Date/Time    CALCIUM 9.1 11/30/2023 1145    ALKPHOS 90 11/30/2023 1145    AST 18 11/30/2023 1145    ALT 18 11/30/2023 1145    BILITOT 0.2 11/30/2023 1145                Assessment/Plan:  Jose Honeycutt is a 43 y.o. female with a history of right triple-negative breast cancer, who presents today for follow-up evaluation.     Malignant neoplasm of female breast (CMS/HCC)  - S/p lumpectomy/SLNBx. She has healed well from surgery.  - Discussed role of adjuvant pembrolizumab, per KEYNOTE-522 trial. We reviewed potential toxicities.   She is due for C3D1 today and does not want any more after cycle 4 . We discussed latest data from extended Keynote 522 study that only showed the 5-year EFS rate was 92.2% with pembrolizumab vs 88.2% with placebo in patients who achieved pCR , hence it may be reasonable to discontinue Pembrolizumab given her reluctance and inflammatory  arthritis    - She still has 7 more sessions of RT and is tolerating it well     -Grade 1 irAE - inflammatory arthritis   Predominantly involving small joints of hands and feet and Rt hip , Ibuprofen helps .   Will stop adjuvant Keytruda after cycle 4 per patient preference .  Recommend continuing using NSAID's and PT .     - Mild thrombocytopenia    ? Radiation effect , less likely Pembro , monitor for now     Disposition.  - RTC in 4 weeks December 28th for 4th dose of adjuvant Pembrolizumab.     - She was given our contact information and instructed to call with concerns/questions in the interim.    No orders of the defined types were placed in this encounter.     Seen and staffed with Dr. Knight who is in agreement with the plan     Dr. Destiny Stearns MD FACP  PGY-6,  Hematology & Oncology Fellow   [facility]  [id number] [place] [address]  [phone]

## 2023-12-01 ENCOUNTER — APPOINTMENT (OUTPATIENT)
Dept: HEMATOLOGY/ONCOLOGY | Facility: HOSPITAL | Age: 43
End: 2023-12-01
Payer: COMMERCIAL

## 2023-12-01 ENCOUNTER — HOSPITAL ENCOUNTER (OUTPATIENT)
Dept: RADIATION ONCOLOGY | Facility: CLINIC | Age: 43
Setting detail: RADIATION/ONCOLOGY SERIES
Discharge: HOME | End: 2023-12-01
Payer: COMMERCIAL

## 2023-12-01 ENCOUNTER — OFFICE VISIT (OUTPATIENT)
Dept: HEMATOLOGY/ONCOLOGY | Facility: HOSPITAL | Age: 43
End: 2023-12-01
Payer: COMMERCIAL

## 2023-12-01 VITALS
TEMPERATURE: 97.5 F | DIASTOLIC BLOOD PRESSURE: 87 MMHG | HEART RATE: 74 BPM | WEIGHT: 148.59 LBS | SYSTOLIC BLOOD PRESSURE: 129 MMHG | OXYGEN SATURATION: 98 % | RESPIRATION RATE: 16 BRPM | BODY MASS INDEX: 24.17 KG/M2

## 2023-12-01 DIAGNOSIS — C50.919 MALIGNANT NEOPLASM OF BREAST IN FEMALE, ESTROGEN RECEPTOR NEGATIVE, UNSPECIFIED LATERALITY, UNSPECIFIED SITE OF BREAST (MULTI): Primary | ICD-10-CM

## 2023-12-01 DIAGNOSIS — C77.3 SECONDARY AND UNSPECIFIED MALIGNANT NEOPLASM OF AXILLA AND UPPER LIMB LYMPH NODES (MULTI): ICD-10-CM

## 2023-12-01 DIAGNOSIS — Z17.1 MALIGNANT NEOPLASM OF BREAST IN FEMALE, ESTROGEN RECEPTOR NEGATIVE, UNSPECIFIED LATERALITY, UNSPECIFIED SITE OF BREAST (MULTI): Primary | ICD-10-CM

## 2023-12-01 DIAGNOSIS — Z51.0 ENCOUNTER FOR ANTINEOPLASTIC RADIATION THERAPY: ICD-10-CM

## 2023-12-01 DIAGNOSIS — R23.2 HOT FLASHES: ICD-10-CM

## 2023-12-01 DIAGNOSIS — C50.511 MALIGNANT NEOPLASM OF LOWER-OUTER QUADRANT OF RIGHT FEMALE BREAST (MULTI): ICD-10-CM

## 2023-12-01 DIAGNOSIS — Z01.419 WELL WOMAN EXAM WITH ROUTINE GYNECOLOGICAL EXAM: ICD-10-CM

## 2023-12-01 LAB
RAD ONC MSQ ACTUAL FRACTIONS DELIVERED: 14
RAD ONC MSQ ACTUAL SESSION DELIVERED DOSE: 266 CGRAY
RAD ONC MSQ ACTUAL TOTAL DOSE: 3724 CGRAY
RAD ONC MSQ ELAPSED DAYS: 18
RAD ONC MSQ LAST DATE: NORMAL
RAD ONC MSQ PRESCRIBED FRACTIONAL DOSE: 266 CGRAY
RAD ONC MSQ PRESCRIBED NUMBER OF FRACTIONS: 16
RAD ONC MSQ PRESCRIBED TECHNIQUE: NORMAL
RAD ONC MSQ PRESCRIBED TOTAL DOSE: 4256 CGRAY
RAD ONC MSQ PRESCRIPTION PATTERN COMMENT: NORMAL
RAD ONC MSQ START DATE: NORMAL
RAD ONC MSQ TREATMENT COURSE NUMBER: 1
RAD ONC MSQ TREATMENT SITE: NORMAL

## 2023-12-01 PROCEDURE — 99215 OFFICE O/P EST HI 40 MIN: CPT | Performed by: NURSE PRACTITIONER

## 2023-12-01 PROCEDURE — 77014 CHG CT GUIDANCE RADIATION THERAPY FLDS PLACEMENT: CPT | Performed by: STUDENT IN AN ORGANIZED HEALTH CARE EDUCATION/TRAINING PROGRAM

## 2023-12-01 PROCEDURE — 1036F TOBACCO NON-USER: CPT | Performed by: NURSE PRACTITIONER

## 2023-12-01 PROCEDURE — 77385 HC INTENSITY-MODULATED RADIATION THERAPY (IMRT), SIMPLE: CPT | Performed by: STUDENT IN AN ORGANIZED HEALTH CARE EDUCATION/TRAINING PROGRAM

## 2023-12-01 ASSESSMENT — PAIN SCALES - GENERAL: PAINLEVEL: 3

## 2023-12-01 NOTE — PROGRESS NOTES
Patient ID: Jose Honeycutt is a 43 y.o. female.  Cancer History:          Breast         AJCC Edition: 8th (AJCC), Diagnosis Date: Jan 2023, IIB, cT1c cN1 cM0 G3   -01/10/23: Bilateral screening mammogram showed a 1.2 cm density in right breast, as well as a 7mm intramammary LN.  -01/12/23: Dx MG/US confirmed a 1.1 x 0.9 x 1.1 cm hypoechoic mass at 8:00, 6 cm FN. At 9:00, 6 cm FN, a 0.5 cm mass noted, possible an intramammary LN. The right axillary LN’s appeared normal.  -01/19/22: US-guided CNB of 8:00 mass showed IDC, grade 3, ER/NC-negative Her2-negative (1+ IHC). 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  -Genetic testing performed, negative for pathogenic mutations.  -02/16/23: C1D1 (pembrolizumab was received, but developed a grade 3 infusion reaction to paclitaxel), switched to Abraxane/carboplatin for week #2. No issues.  -05/04/23: C4D15 completed abraxane/carboplatin  -05/18/23: C5D1 adriamycin/cyclophosphamide + pembrolizumab   -07/20/23: C8D15 completed adriamycin/cyclophosphamide  -08/25/23: R breast partial mastectomy with sential lymph node dissection  -10/19/23: C9D1 pembrolizumab alone     Subjective    Jose Honeycutt is a 43 y.o. F with a history of triple negative breast cancer. She is now s/p jean carlos-adjuvant carboplatin/paclitaxel/pembrolizumab x 4 cycles followed by cyclophosphamide/doxorubicin/pembrolizumab x 4 cycles. Underwent surgery with R partial mastectomy 08/2023, currently on pembrolizumab only.      Returns today in follow up. Since our last visit in April, she has had complete cessation of her menses, LMP May, 2023. Last chemotherapy 07/2023, continues on immunotherapy with plan to complete this month. Recently noted some cervical mucous thickening and menstrual cramps and was anticipating menses but still remains amenorrheic. Vasomotor symptoms/hot flashes improved with cessation of caffeine, and regular acupuncture. She notes an increase in hot  flashes since missing acupuncture in the past month. Hot flashes occur most days of the week but not daily, last for minutes and resolve. No longer taking any supplements for her menstrual symptoms. Has copper IUD in place for contraception, has completed childbearing. Still with decreased interest in sex pre-existing prior to cancer diagnosis, no problems with vaginal dryness, no pain with intercourse.    Is practicing gratitude/deep breathing daily to help with mood and sleep. Mood is much improved with cessation of caffeine, she notes sleeping well and relays less anxiety. Is doing journaling and talk therapy as well to help with mood. Notes improvement in mood also with anticipation of completion of chemotherapy.     Exercising most days with walking, has not been as routine with yoga but would like to. Eating a well balanced diet with fruits/vegetables and whole grains, selecting lean proteins and has incorporated meat back into her diet.       Social History: Lives in [place], [place]. No ETOH, recreational drug use, or tobacco. 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            Objective    BSA: 1.77 meters squared  /87 (BP Location: Left arm, Patient Position: Sitting, BP Cuff Size: Adult)   Pulse 74   Temp 36.4 °C (97.5 °F) (Temporal)   Resp 16   Wt 67.4 kg (148 lb 9.4 oz)   SpO2 98%   BMI 24.17 kg/m²      Physical Exam  Constitutional:       General: She is not in acute distress.     Appearance: Normal appearance. She is normal weight. She is not ill-appearing.   Neurological:      Mental Status: She is alert.   Psychiatric:         Mood and Affect: Mood normal.         Behavior: Behavior normal.         Thought Content: Thought content normal.         Judgment: Judgment normal.       Performance Status:  Asymptomatic    Assessment/Plan   Jose Honeycutt is a 43 y.o. F with a recent  history of triple negative breast cancer. S/p jean carlos-adjuvant carboplatin/paclitaxel/pembrolizumab, and adriamycin/cytoxan, underwent R partial mastectomy 08/2023, and now currently on pembrolizumab adjuvant therapy.     #At risk for primary ovarian insufficiency: with amenorrhea post chemotherapy, last chemotherapy 07/2023  - Previously discussed the damaging effect cancer treatment can have on the ovaries.   - Previously discussed natural age related decline in fertility and menopause that occurs as a result of ovarian aging, and that cancer treatments can accelerate that progression and diminish ovarian reserve.  - Individuals may experience varying degrees of short or long term ovarian dysfunction and amenorrhea, and that this is dependent upon type of cancer treatment, cumulative dose, and patients age.   - Loss of ovarian function may be permanent or temporary.  - Amenorrhea may be temporary or permanent -- temporary amenorrhea results from destruction of maturing follicles whereas permanent amenorrhea results from depletion of viable primordial follicles.  - Continue to monitor for resumption of menses  - FSH/LH elevated 57/34 and estradiol low at 22 in 04/2023 this was while she was on chemotherapy  - Discussed plan to repeat hormone testing at one year out from chemotherapy; will include AMH as well    #Hot flashes: likely related to ovarian suppression/insufficiency from chemotherapy, improved with acupuncture  - Continue acupuncture  - Discussed omission of caffeine and other triggering foods  - Discussed recent literature demonstrating impact of deep breathing/mindfulness/meditation on hot flashes specifically reducing severity, duration, and frequency -- encouraged patient to continue these techniques 1-2x/daily outside of when she is having hot flashes    #Sexual dysfunction/contraception:  - Currently using copper IUD  - Not currently experiencing sexual dysfunction  - Referral to gynecology for well  woman annual exam -- Philip Peralta CNP    #Diet/Exercise/Healthy Lifestyle:  - Discussed research demonstrating consumption of a healthy, plant based diet not only decreases cancer risk, but also has been found to improve survival in cancer patients who partake in a healthy diet.   - We reviewed the American Cancer Society guidelines for a healthy diet including mostly plant based foods  with incorporation of plant/lean animal proteins and healthy fats such as the mediterranean diet.   - Discussed research that demonstrates decreased cancer risk and improved survival in cancer patients who exercise and stay active throughout diagnosis and treatment.  - Encouraged patient to continue exercise as tolerated    #Anxiety: baseline anxiety and with added stressors of cancer/treatment -- manifesting primarily as cardiac symptoms/sleep interruption, historically  has tried zoloft/prozac without relief, overall improved with eliminating caffeine from diet  - Patient connected with [facility] for peer support  - Patient currently seeing therapist for talk therapy  - Continue abstaining from caffeine given mood benefit     Follow up in July at one year out from chemotherapy, patient to contact me in the interim via phone or email with any questions or concerns    Cancer Staging   Malignant neoplasm of female breast (CMS/HCC)  Staging form: Breast, AJCC 8th Edition  - Clinical stage from 1/19/2023: Stage IIB (cT1c, cN1(f), cM0, G3, ER-, GA-, HER2-) - Signed by Mango Hernandez MD on 10/11/2023  - Pathologic stage from 8/25/2023: No Stage Recommended (ypT0, pN0(sn), cM0, G3, ER-, GA-, HER2-) - Signed by Mango Hernandez MD on 10/11/2023      Oncology History   Malignant neoplasm of female breast (CMS/HCC)   1/19/2023 Cancer Staged    Staging form: Breast, AJCC 8th Edition, Clinical stage from 1/19/2023: Stage IIB (cT1c, cN1(f), cM0, G3, ER-, GA-, HER2-) - Signed by Mango Hernandez MD on 10/11/2023     2/16/2023 -   Chemotherapy    Pembrolizumab + PACLitaxel / CARBOplatin followed by Pembrolizumab + DOXOrubicin / Cyclophosphamide followed by Pembrolizumab, 21 Day Cycles     8/25/2023 Cancer Staged    Staging form: Breast, AJCC 8th Edition, Pathologic stage from 8/25/2023: No Stage Recommended (ypT0, pN0(sn), cM0, G3, ER-, IA-, HER2-) - Signed by Mango Hernandez MD on 10/11/2023     9/26/2023 Initial Diagnosis    Malignant neoplasm of female breast (CMS/HCC)                   Piper Gupta, APRRIVAS-CNP

## 2023-12-04 ENCOUNTER — HOSPITAL ENCOUNTER (OUTPATIENT)
Dept: RADIATION ONCOLOGY | Facility: CLINIC | Age: 43
Setting detail: RADIATION/ONCOLOGY SERIES
Discharge: HOME | End: 2023-12-04
Payer: COMMERCIAL

## 2023-12-04 DIAGNOSIS — C50.511 MALIGNANT NEOPLASM OF LOWER-OUTER QUADRANT OF RIGHT FEMALE BREAST (MULTI): ICD-10-CM

## 2023-12-04 DIAGNOSIS — Z51.0 ENCOUNTER FOR ANTINEOPLASTIC RADIATION THERAPY: ICD-10-CM

## 2023-12-04 DIAGNOSIS — C77.3 SECONDARY AND UNSPECIFIED MALIGNANT NEOPLASM OF AXILLA AND UPPER LIMB LYMPH NODES (MULTI): ICD-10-CM

## 2023-12-04 LAB
RAD ONC MSQ ACTUAL FRACTIONS DELIVERED: 15
RAD ONC MSQ ACTUAL SESSION DELIVERED DOSE: 266 CGRAY
RAD ONC MSQ ACTUAL TOTAL DOSE: 3990 CGRAY
RAD ONC MSQ ELAPSED DAYS: 21
RAD ONC MSQ LAST DATE: NORMAL
RAD ONC MSQ PRESCRIBED FRACTIONAL DOSE: 266 CGRAY
RAD ONC MSQ PRESCRIBED NUMBER OF FRACTIONS: 16
RAD ONC MSQ PRESCRIBED TECHNIQUE: NORMAL
RAD ONC MSQ PRESCRIBED TOTAL DOSE: 4256 CGRAY
RAD ONC MSQ PRESCRIPTION PATTERN COMMENT: NORMAL
RAD ONC MSQ START DATE: NORMAL
RAD ONC MSQ TREATMENT COURSE NUMBER: 1
RAD ONC MSQ TREATMENT SITE: NORMAL

## 2023-12-04 PROCEDURE — 77385 HC INTENSITY-MODULATED RADIATION THERAPY (IMRT), SIMPLE: CPT | Performed by: STUDENT IN AN ORGANIZED HEALTH CARE EDUCATION/TRAINING PROGRAM

## 2023-12-04 PROCEDURE — 77014 CHG CT GUIDANCE RADIATION THERAPY FLDS PLACEMENT: CPT | Performed by: STUDENT IN AN ORGANIZED HEALTH CARE EDUCATION/TRAINING PROGRAM

## 2023-12-05 ENCOUNTER — HOSPITAL ENCOUNTER (OUTPATIENT)
Dept: RADIATION ONCOLOGY | Facility: CLINIC | Age: 43
Setting detail: RADIATION/ONCOLOGY SERIES
Discharge: HOME | End: 2023-12-05
Payer: COMMERCIAL

## 2023-12-05 ENCOUNTER — APPOINTMENT (OUTPATIENT)
Dept: PHYSICAL THERAPY | Facility: CLINIC | Age: 43
End: 2023-12-05
Payer: COMMERCIAL

## 2023-12-05 DIAGNOSIS — Z51.0 ENCOUNTER FOR ANTINEOPLASTIC RADIATION THERAPY: ICD-10-CM

## 2023-12-05 DIAGNOSIS — C77.3 SECONDARY AND UNSPECIFIED MALIGNANT NEOPLASM OF AXILLA AND UPPER LIMB LYMPH NODES (MULTI): ICD-10-CM

## 2023-12-05 DIAGNOSIS — C50.511 MALIGNANT NEOPLASM OF LOWER-OUTER QUADRANT OF RIGHT FEMALE BREAST (MULTI): ICD-10-CM

## 2023-12-05 LAB
RAD ONC MSQ ACTUAL FRACTIONS DELIVERED: 16
RAD ONC MSQ ACTUAL SESSION DELIVERED DOSE: 266 CGRAY
RAD ONC MSQ ACTUAL TOTAL DOSE: 4256 CGRAY
RAD ONC MSQ ELAPSED DAYS: 22
RAD ONC MSQ LAST DATE: NORMAL
RAD ONC MSQ PRESCRIBED FRACTIONAL DOSE: 266 CGRAY
RAD ONC MSQ PRESCRIBED NUMBER OF FRACTIONS: 16
RAD ONC MSQ PRESCRIBED TECHNIQUE: NORMAL
RAD ONC MSQ PRESCRIBED TOTAL DOSE: 4256 CGRAY
RAD ONC MSQ PRESCRIPTION PATTERN COMMENT: NORMAL
RAD ONC MSQ START DATE: NORMAL
RAD ONC MSQ TREATMENT COURSE NUMBER: 1
RAD ONC MSQ TREATMENT SITE: NORMAL

## 2023-12-05 PROCEDURE — 77385 HC INTENSITY-MODULATED RADIATION THERAPY (IMRT), SIMPLE: CPT | Performed by: STUDENT IN AN ORGANIZED HEALTH CARE EDUCATION/TRAINING PROGRAM

## 2023-12-05 PROCEDURE — 77014 CHG CT GUIDANCE RADIATION THERAPY FLDS PLACEMENT: CPT | Performed by: STUDENT IN AN ORGANIZED HEALTH CARE EDUCATION/TRAINING PROGRAM

## 2023-12-06 ENCOUNTER — HOSPITAL ENCOUNTER (OUTPATIENT)
Dept: RADIATION ONCOLOGY | Facility: CLINIC | Age: 43
Setting detail: RADIATION/ONCOLOGY SERIES
Discharge: HOME | End: 2023-12-06
Payer: COMMERCIAL

## 2023-12-06 ENCOUNTER — APPOINTMENT (OUTPATIENT)
Dept: RADIATION ONCOLOGY | Facility: CLINIC | Age: 43
End: 2023-12-06
Payer: COMMERCIAL

## 2023-12-06 ENCOUNTER — ALLIED HEALTH (OUTPATIENT)
Dept: INTEGRATIVE MEDICINE | Facility: CLINIC | Age: 43
End: 2023-12-06

## 2023-12-06 ENCOUNTER — RADIATION ONCOLOGY OTV (OUTPATIENT)
Dept: RADIATION ONCOLOGY | Facility: CLINIC | Age: 43
End: 2023-12-06

## 2023-12-06 ENCOUNTER — APPOINTMENT (OUTPATIENT)
Dept: INTEGRATIVE MEDICINE | Facility: CLINIC | Age: 43
End: 2023-12-06
Payer: COMMERCIAL

## 2023-12-06 VITALS
OXYGEN SATURATION: 98 % | HEART RATE: 90 BPM | RESPIRATION RATE: 18 BRPM | SYSTOLIC BLOOD PRESSURE: 123 MMHG | WEIGHT: 149.91 LBS | BODY MASS INDEX: 24.38 KG/M2 | TEMPERATURE: 96.8 F | DIASTOLIC BLOOD PRESSURE: 84 MMHG

## 2023-12-06 DIAGNOSIS — C77.3 SECONDARY AND UNSPECIFIED MALIGNANT NEOPLASM OF AXILLA AND UPPER LIMB LYMPH NODES (MULTI): ICD-10-CM

## 2023-12-06 DIAGNOSIS — Z51.0 ENCOUNTER FOR ANTINEOPLASTIC RADIATION THERAPY: ICD-10-CM

## 2023-12-06 DIAGNOSIS — M25.50 JOINT PAIN: Primary | ICD-10-CM

## 2023-12-06 DIAGNOSIS — R60.9 SWELLING: ICD-10-CM

## 2023-12-06 DIAGNOSIS — R63.0 POOR APPETITE: ICD-10-CM

## 2023-12-06 DIAGNOSIS — C50.511 MALIGNANT NEOPLASM OF LOWER-OUTER QUADRANT OF RIGHT FEMALE BREAST (MULTI): ICD-10-CM

## 2023-12-06 LAB
RAD ONC MSQ ACTUAL FRACTIONS DELIVERED: 1
RAD ONC MSQ ACTUAL SESSION DELIVERED DOSE: 250 CGRAY
RAD ONC MSQ ACTUAL TOTAL DOSE: 250 CGRAY
RAD ONC MSQ ELAPSED DAYS: 0
RAD ONC MSQ LAST DATE: NORMAL
RAD ONC MSQ PRESCRIBED FRACTIONAL DOSE: 250 CGRAY
RAD ONC MSQ PRESCRIBED NUMBER OF FRACTIONS: 4
RAD ONC MSQ PRESCRIBED TECHNIQUE: NORMAL
RAD ONC MSQ PRESCRIBED TOTAL DOSE: 1000 CGRAY
RAD ONC MSQ PRESCRIPTION PATTERN COMMENT: NORMAL
RAD ONC MSQ START DATE: NORMAL
RAD ONC MSQ TREATMENT COURSE NUMBER: 1
RAD ONC MSQ TREATMENT SITE: NORMAL

## 2023-12-06 PROCEDURE — 77014 CHG CT GUIDANCE RADIATION THERAPY FLDS PLACEMENT: CPT | Performed by: STUDENT IN AN ORGANIZED HEALTH CARE EDUCATION/TRAINING PROGRAM

## 2023-12-06 PROCEDURE — 77385 HC INTENSITY-MODULATED RADIATION THERAPY (IMRT), SIMPLE: CPT | Performed by: STUDENT IN AN ORGANIZED HEALTH CARE EDUCATION/TRAINING PROGRAM

## 2023-12-06 PROCEDURE — 77336 RADIATION PHYSICS CONSULT: CPT | Performed by: STUDENT IN AN ORGANIZED HEALTH CARE EDUCATION/TRAINING PROGRAM

## 2023-12-06 PROCEDURE — 77427 RADIATION TX MANAGEMENT X5: CPT | Performed by: STUDENT IN AN ORGANIZED HEALTH CARE EDUCATION/TRAINING PROGRAM

## 2023-12-06 PROCEDURE — 97139 UNLISTED THERAPEUTIC PX: CPT | Performed by: ACUPUNCTURIST

## 2023-12-06 ASSESSMENT — PAIN SCALES - GENERAL: PAINLEVEL: 0-NO PAIN

## 2023-12-06 NOTE — PROGRESS NOTES
Radiation Oncology On Treatment Visit    Patient Name:  Jose Honeycutt  MRN:  [id number]  :  1980    Referring Provider: Rod Tate MD  Primary Care Provider: No Assigned PCP Generic Provider, MD  Care Team: Patient Care Team:  No Assigned Pcp Generic Provider, MD as PCP - General (Family Medicine)  Joseph Fuchs MD as Consulting Physician (Hematology and Oncology)    Date of Service: 2023     Diagnosis:   Specialty Problems          Radiation Oncology Problems    Malignant neoplasm of female breast (CMS/HCC)         Treatment Summary:  Radiation Treatments       Active   No active radiation treatments to show.     Completed   Rt breast (Started on 2023)   Most recent fraction: 266 cGy given on 2023   Total given: 4,256 cGy / 4,256 cGy  (16 of 16 fractions)   Elapsed Days: 22   Technique: VMAT                   SUBJECTIVE: Fatigue improving. No sore throat. No SOB. PORT boost today. Last immunotherapy scheduled later this week.     OBJECTIVE:   Vital Signs:  /84 (BP Location: Left arm, Patient Position: Sitting)   Pulse 90   Temp 36 °C (96.8 °F)   Resp 18   Wt 68 kg (149 lb 14.6 oz)   SpO2 98%   BMI 24.38 kg/m²    Pain Scale: The patient's current pain level was assessed.  They report currently having a pain of 0 out of 10.    Other Pertinent Findings:     Toxicity Assessment          11/15/2023    14:04 2023    09:40 2023    09:26 2023    10:15   Toxicity Assessment   Treatment Site Breast Breast Breast Breast   Anorexia Grade 0 Grade 0 Grade 0 Grade 0   Anxiety Grade 0 Grade 0 Grade 0 Grade 0   Dehydration Grade 0 Grade 0 Grade 0 Grade 0   Depression Grade 0 Grade 0 Grade 0 Grade 0   Dermatitis Radiation Grade 0 Grade 0 Grade 0       udderly smooth 2 times per day Grade 1   Diarrhea Grade 0 Grade 0 Grade 0 Grade 0   Fatigue Grade 0 Grade 1       pushes through the day Grade 1 Grade 0   Fibrosis Deep Connective Tissue Grade 0 Grade 0 Grade 0 Grade 0   Fracture  Grade 0 Grade 0 Grade 0 Grade 0   Nausea Grade 0 Grade 1 Grade 0 Grade 0   Pain Grade 1       hip pain 4/10 Grade 1       body baseline aches Grade 1       baseline body aches Grade 0   Treatment Related Secondary Malignancy Grade 0 Grade 0 Grade 0 Grade 0   Tumor Pain Grade 0 Grade 0 Grade 0 Grade 0   Vomiting Grade 0 Grade 0 Grade 0 Grade 0   Abdominal Pain Grade 0  Grade 0    Dysphagia Grade 0  Grade 0    Esophagitis Grade 0  Grade 0    Gastric Hemorrhage Grade 0  Grade 0    Mucositis Oral Grade 0  Grade 0    Dry Mouth Grade 0 Grade 0 Grade 0 Grade 0   Breast Infection Grade 0 Grade 0 Grade 0 Grade 0   Seroma Grade 0 Grade 0 Grade 0 Grade 0   Joint Range of Motion Decreased Grade 0 Grade 0 Grade 0 Grade 0   Joint Range of Motion Decreased Lumbar Spine Grade 0 Grade 0 Grade 0 Grade 0   Brachial Plexopathy Grade 0 Grade 0 Grade 0 Grade 0   Breast Atrophy Grade 0 Grade 0 Grade 0 Grade 0   Breast Pain Grade 0 Grade 0 Grade 0 Grade 0   Nipple Deformity Grade 0 Grade 0 Grade 0 Grade 0   Pneumonitis Grade 0 Grade 0 Grade 0    Edema Limbs Grade 0 Grade 0 Grade 0 Grade 0   Lymphedema Grade 0 Grade 0 Grade 0 Grade 0   Thromboembolic Event Grade 0 Grade 0 Grade 0 Grade 0   Hot Flashes Grade 0 Grade 0 Grade 0 Grade 0          Concurrent systemic therapy: DOXOrubicin (Adriamycin) IV syringe 104.4 mg, 60 mg/m2, intravenous, Once, 4 of 4 cycles        fosaprepitant (Emend) 150 mg in sodium chloride 0.9% 250 mL IV, 150 mg, intravenous, Once, 4 of 4 cycles        CARBOplatin (Paraplatin) in sodium chloride 0.9% 100 mL IV, , intravenous, Once, 4 of 4 cycles        PACLitaxeL (Taxol) 138 mg in dextrose 5% 100 mL IV, 80 mg/m2, intravenous, Once, 4 of 4 cycles        palonosetron (Aloxi) injection 250 mcg, 0.25 mg, intravenous, Once, 8 of 8 cycles        cyclophosphamide (Cytoxan) 600 mg/m2 = 1,000 mg in sodium chloride 0.9% 100 mL IV, 600 mg/m2, intravenous, Once, 4 of 4 cycles        pembrolizumab (Keytruda) 200 mg in sodium  chloride 0.9% 100 mL IV, 200 mg, intravenous, Once, 11 of 17 cycles    Administration: 200 mg (10/19/2023), 200 mg (11/9/2023), 200 mg (11/30/2023)      Labs:   WBC   Date Value Ref Range Status   11/30/2023 2.9 (L) 4.4 - 11.3 x10*3/uL Final   11/09/2023 3.8 (L) 4.4 - 11.3 x10*3/uL Final     Hemoglobin   Date Value Ref Range Status   11/30/2023 11.6 (L) 12.0 - 16.0 g/dL Final   11/09/2023 11.9 (L) 12.0 - 16.0 g/dL Final     Hematocrit   Date Value Ref Range Status   11/30/2023 34.8 (L) 36.0 - 46.0 % Final   11/09/2023 35.5 (L) 36.0 - 46.0 % Final     Neutrophils Absolute   Date Value Ref Range Status   11/30/2023 1.86 1.20 - 7.70 x10*3/uL Final     Comment:     Percent differential counts (%) should be interpreted in the context of the absolute cell counts (cells/uL).   11/09/2023 1.58 1.20 - 7.70 x10*3/uL Final     Comment:     Percent differential counts (%) should be interpreted in the context of the absolute cell counts (cells/uL).     Platelets   Date Value Ref Range Status   11/30/2023 126 (L) 150 - 450 x10*3/uL Final   11/09/2023 160 150 - 450 x10*3/uL Final     Alkaline Phosphatase   Date Value Ref Range Status   11/30/2023 90 33 - 110 U/L Final   11/09/2023 97 33 - 110 U/L Final     AST   Date Value Ref Range Status   11/30/2023 18 9 - 39 U/L Final   11/09/2023 17 9 - 39 U/L Final     ALT   Date Value Ref Range Status   11/30/2023 18 7 - 45 U/L Final     Comment:     Patients treated with Sulfasalazine may generate falsely decreased results for ALT.   11/09/2023 16 7 - 45 U/L Final     Comment:     Patients treated with Sulfasalazine may generate falsely decreased results for ALT.     Bilirubin, Total   Date Value Ref Range Status   11/30/2023 0.2 0.0 - 1.2 mg/dL Final   11/09/2023 0.2 0.0 - 1.2 mg/dL Final     Glucose   Date Value Ref Range Status   11/30/2023 107 (H) 74 - 99 mg/dL Final   11/09/2023 101 (H) 74 - 99 mg/dL Final     Calcium   Date Value Ref Range Status   11/30/2023 9.1 8.6 - 10.3 mg/dL  Final   11/09/2023 9.0 8.6 - 10.3 mg/dL Final     Sodium   Date Value Ref Range Status   11/30/2023 140 136 - 145 mmol/L Final   11/09/2023 139 136 - 145 mmol/L Final     Potassium   Date Value Ref Range Status   11/30/2023 4.1 3.5 - 5.3 mmol/L Final   11/09/2023 4.0 3.5 - 5.3 mmol/L Final     Bicarbonate   Date Value Ref Range Status   11/30/2023 28 21 - 32 mmol/L Final   11/09/2023 27 21 - 32 mmol/L Final     Chloride   Date Value Ref Range Status   11/30/2023 105 98 - 107 mmol/L Final   11/09/2023 105 98 - 107 mmol/L Final     Urea Nitrogen   Date Value Ref Range Status   11/30/2023 16 6 - 23 mg/dL Final   11/09/2023 15 6 - 23 mg/dL Final     Creatinine   Date Value Ref Range Status   11/30/2023 0.48 (L) 0.50 - 1.05 mg/dL Final   11/09/2023 0.58 0.50 - 1.05 mg/dL Final         Exam: Grade 2 radiation dermatitis of the right breast.     Assessment / Plan:  The patient is tolerating radiation therapy as anticipated.  Continue per current treatment plan.       Therapies: Continue topical creams. Patient will receive immunotherapy later this month.     Side effects reviewed with patient. Images, chart and labs reviewed.    Plan for follow up in 4 weeks for post-radiatino assessment. Patient will continue to follow breast surgery under Dr. Dyer's care.      Mango Hernandez MD

## 2023-12-06 NOTE — PROGRESS NOTES
Acupuncture Visit:     Subjective   Patient ID: Jose Honeycutt is a 43 y.o. female who presents for No chief complaint on file.  Pt reported that all symptoms have reduced significantly.  Swelling has reduced to level that she can wear her wedding rings, joint pain reduced, appetite improved.        Session Information  Is this acupuncture treatment being billed to the patient's insurance company: No  Visit Type: Follow-up visit         Review of Systems         Provider reviewed plan for the acupuncture session, precautions and contraindications. Patient/guardian/hospital staff has given consent to treat with full understanding of what to expect during the session. Before acupuncture began, provider explained to the patient to communicate at any time if the procedure was causing discomfort past their tolerance level. Patient agreed to advise acupuncturist. The acupuncturist counseled the patient on the risks of acupuncture treatment including pain, infection, bleeding, and no relief of pain. The patient was positioned comfortably. There was no evidence of infection at the site of needle insertions.    Objective   Physical Exam         Treatment Plan  Treatment Goals: Appetite improvement, Pain management  Pattern Differentiation: st qi deficiency, adrenal immune an sugar imbalance  Treatment Principle: regulate adrenal, immune and sugar, move qi and blood    Acupuncture Treatment  Patient Position: Seated and reclining  Acupuncture Needling: Yes  Needle Guage: 40 guage /.16/ Red seirin  Body Points: With retention  Body Points - Left: emma 5, lr 4  Body Points - Bilateral: st qi x2, immune, k 7, 10, 27, sp 6, 9  Needle Count In: 16  Needle Count Out: 16  Needle Retention Time (min): 25 minutes  Total Face to Face Time (min): 25 minutes              Assessment/Plan

## 2023-12-07 ENCOUNTER — HOSPITAL ENCOUNTER (OUTPATIENT)
Dept: RADIATION ONCOLOGY | Facility: CLINIC | Age: 43
Setting detail: RADIATION/ONCOLOGY SERIES
Discharge: HOME | End: 2023-12-07
Payer: COMMERCIAL

## 2023-12-07 DIAGNOSIS — C77.3 SECONDARY AND UNSPECIFIED MALIGNANT NEOPLASM OF AXILLA AND UPPER LIMB LYMPH NODES (MULTI): ICD-10-CM

## 2023-12-07 DIAGNOSIS — C50.511 MALIGNANT NEOPLASM OF LOWER-OUTER QUADRANT OF RIGHT FEMALE BREAST (MULTI): ICD-10-CM

## 2023-12-07 DIAGNOSIS — Z51.0 ENCOUNTER FOR ANTINEOPLASTIC RADIATION THERAPY: ICD-10-CM

## 2023-12-07 LAB
RAD ONC MSQ ACTUAL FRACTIONS DELIVERED: 2
RAD ONC MSQ ACTUAL SESSION DELIVERED DOSE: 250 CGRAY
RAD ONC MSQ ACTUAL TOTAL DOSE: 500 CGRAY
RAD ONC MSQ ELAPSED DAYS: 1
RAD ONC MSQ LAST DATE: NORMAL
RAD ONC MSQ PRESCRIBED FRACTIONAL DOSE: 250 CGRAY
RAD ONC MSQ PRESCRIBED NUMBER OF FRACTIONS: 4
RAD ONC MSQ PRESCRIBED TECHNIQUE: NORMAL
RAD ONC MSQ PRESCRIBED TOTAL DOSE: 1000 CGRAY
RAD ONC MSQ PRESCRIPTION PATTERN COMMENT: NORMAL
RAD ONC MSQ START DATE: NORMAL
RAD ONC MSQ TREATMENT COURSE NUMBER: 1
RAD ONC MSQ TREATMENT SITE: NORMAL

## 2023-12-07 PROCEDURE — 77014 CHG CT GUIDANCE RADIATION THERAPY FLDS PLACEMENT: CPT | Performed by: STUDENT IN AN ORGANIZED HEALTH CARE EDUCATION/TRAINING PROGRAM

## 2023-12-07 PROCEDURE — 77385 HC INTENSITY-MODULATED RADIATION THERAPY (IMRT), SIMPLE: CPT | Performed by: STUDENT IN AN ORGANIZED HEALTH CARE EDUCATION/TRAINING PROGRAM

## 2023-12-08 ENCOUNTER — HOSPITAL ENCOUNTER (OUTPATIENT)
Dept: RADIATION ONCOLOGY | Facility: CLINIC | Age: 43
Setting detail: RADIATION/ONCOLOGY SERIES
Discharge: HOME | End: 2023-12-08
Payer: COMMERCIAL

## 2023-12-08 DIAGNOSIS — C77.3 SECONDARY AND UNSPECIFIED MALIGNANT NEOPLASM OF AXILLA AND UPPER LIMB LYMPH NODES (MULTI): ICD-10-CM

## 2023-12-08 DIAGNOSIS — Z51.0 ENCOUNTER FOR ANTINEOPLASTIC RADIATION THERAPY: ICD-10-CM

## 2023-12-08 DIAGNOSIS — C50.511 MALIGNANT NEOPLASM OF LOWER-OUTER QUADRANT OF RIGHT FEMALE BREAST (MULTI): ICD-10-CM

## 2023-12-08 LAB
RAD ONC MSQ ACTUAL FRACTIONS DELIVERED: 3
RAD ONC MSQ ACTUAL SESSION DELIVERED DOSE: 250 CGRAY
RAD ONC MSQ ACTUAL TOTAL DOSE: 750 CGRAY
RAD ONC MSQ ELAPSED DAYS: 2
RAD ONC MSQ LAST DATE: NORMAL
RAD ONC MSQ PRESCRIBED FRACTIONAL DOSE: 250 CGRAY
RAD ONC MSQ PRESCRIBED NUMBER OF FRACTIONS: 4
RAD ONC MSQ PRESCRIBED TECHNIQUE: NORMAL
RAD ONC MSQ PRESCRIBED TOTAL DOSE: 1000 CGRAY
RAD ONC MSQ PRESCRIPTION PATTERN COMMENT: NORMAL
RAD ONC MSQ START DATE: NORMAL
RAD ONC MSQ TREATMENT COURSE NUMBER: 1
RAD ONC MSQ TREATMENT SITE: NORMAL

## 2023-12-08 PROCEDURE — 77014 CHG CT GUIDANCE RADIATION THERAPY FLDS PLACEMENT: CPT | Performed by: RADIOLOGY

## 2023-12-08 PROCEDURE — 77385 HC INTENSITY-MODULATED RADIATION THERAPY (IMRT), SIMPLE: CPT | Performed by: RADIOLOGY

## 2023-12-11 ENCOUNTER — APPOINTMENT (OUTPATIENT)
Dept: RADIATION ONCOLOGY | Facility: CLINIC | Age: 43
End: 2023-12-11
Payer: COMMERCIAL

## 2023-12-11 ENCOUNTER — TELEPHONE (OUTPATIENT)
Dept: RADIATION ONCOLOGY | Facility: CLINIC | Age: 43
End: 2023-12-11
Payer: COMMERCIAL

## 2023-12-11 DIAGNOSIS — C50.511 MALIGNANT NEOPLASM OF LOWER-OUTER QUADRANT OF RIGHT FEMALE BREAST, UNSPECIFIED ESTROGEN RECEPTOR STATUS (MULTI): ICD-10-CM

## 2023-12-12 ENCOUNTER — HOSPITAL ENCOUNTER (OUTPATIENT)
Dept: RADIATION ONCOLOGY | Facility: CLINIC | Age: 43
Setting detail: RADIATION/ONCOLOGY SERIES
Discharge: HOME | End: 2023-12-12
Payer: COMMERCIAL

## 2023-12-12 ENCOUNTER — DOCUMENTATION (OUTPATIENT)
Dept: RADIATION ONCOLOGY | Facility: CLINIC | Age: 43
End: 2023-12-12

## 2023-12-12 DIAGNOSIS — C77.3 SECONDARY AND UNSPECIFIED MALIGNANT NEOPLASM OF AXILLA AND UPPER LIMB LYMPH NODES (MULTI): ICD-10-CM

## 2023-12-12 DIAGNOSIS — Z51.0 ENCOUNTER FOR ANTINEOPLASTIC RADIATION THERAPY: ICD-10-CM

## 2023-12-12 DIAGNOSIS — C50.511 MALIGNANT NEOPLASM OF LOWER-OUTER QUADRANT OF RIGHT FEMALE BREAST (MULTI): ICD-10-CM

## 2023-12-12 LAB
RAD ONC MSQ ACTUAL FRACTIONS DELIVERED: 4
RAD ONC MSQ ACTUAL SESSION DELIVERED DOSE: 250 CGRAY
RAD ONC MSQ ACTUAL TOTAL DOSE: 1000 CGRAY
RAD ONC MSQ ELAPSED DAYS: 6
RAD ONC MSQ LAST DATE: NORMAL
RAD ONC MSQ PRESCRIBED FRACTIONAL DOSE: 250 CGRAY
RAD ONC MSQ PRESCRIBED NUMBER OF FRACTIONS: 4
RAD ONC MSQ PRESCRIBED TECHNIQUE: NORMAL
RAD ONC MSQ PRESCRIBED TOTAL DOSE: 1000 CGRAY
RAD ONC MSQ PRESCRIPTION PATTERN COMMENT: NORMAL
RAD ONC MSQ START DATE: NORMAL
RAD ONC MSQ TREATMENT COURSE NUMBER: 1
RAD ONC MSQ TREATMENT SITE: NORMAL

## 2023-12-12 PROCEDURE — 77014 CHG CT GUIDANCE RADIATION THERAPY FLDS PLACEMENT: CPT | Performed by: STUDENT IN AN ORGANIZED HEALTH CARE EDUCATION/TRAINING PROGRAM

## 2023-12-12 PROCEDURE — 77385 HC INTENSITY-MODULATED RADIATION THERAPY (IMRT), SIMPLE: CPT | Performed by: STUDENT IN AN ORGANIZED HEALTH CARE EDUCATION/TRAINING PROGRAM

## 2023-12-13 ENCOUNTER — DOCUMENTATION (OUTPATIENT)
Dept: RADIATION ONCOLOGY | Facility: CLINIC | Age: 43
End: 2023-12-13

## 2023-12-13 ENCOUNTER — APPOINTMENT (OUTPATIENT)
Dept: INTEGRATIVE MEDICINE | Facility: CLINIC | Age: 43
End: 2023-12-13
Payer: COMMERCIAL

## 2023-12-13 NOTE — PROGRESS NOTES
Radiation Oncology Treatment Summary    Patient Name:  Jose Honeycutt  MRN:  [id number]  :  1980    Referring Provider: Rod Tate MD  Primary Care Provider: No Assigned PCP Generic Provider, MD    Brief History:   Please see the radiation oncology consultation note.  Briefly, Jose Honeycutt is a 43 y.o. female with Malignant neoplasm of female breast (CMS/HCC), Clinical: Stage IIB (cT1c, cN1(f), cM0, G3, ER-, LA-, HER2-)  Malignant neoplasm of female breast (CMS/HCC), Pathologic: No Stage Recommended (ypT0, pN0(sn), cM0, G3, ER-, LA-, HER2-).  The patient completed radiotherapy as outlined below.    The radiation planning and treatment procedures were explained to the patient in advance, and all questions were answered. The benefits and goals of treatment, options and alternatives, limitations, side effects and risks of radiation were also explained. The patient provided informed consent.    Technical Summary:  Region(s) Treated: Right breast and regional lymph nodes  Radiation Dose Prescribed: 4256 cGy in 16 fractions to the right whole breast and regional lymph nodes with 1000 cGy in 4 fractions tumor bed boost  Radiation Technique/Machine/Energy Used: VMAT to WBRT/RNI - 3 arcs; 3DRT tumor bed boost / Truebeam / 6X and 10X photons   Additional radiation technical details available in our radiation oncology EMR [facility] and our treatment planning system.      Radiation Treatment Summary:  R cuate tx dates 23 - 23 20fx 5256cGy  Radiation Treatments       Active   No active radiation treatments to show.     Completed   Rt TB boost (Started on 2023)   Most recent fraction: 250 cGy given on 2023   Total given: 1,000 cGy / 1,000 cGy  (4 of 4 fractions)   Elapsed Days: 6   Technique: 3-Field Boost        Rt breast (Started on 2023)   Most recent fraction: 266 cGy given on 2023   Total given: 4,256 cGy / 4,256 cGy  (16 of 16 fractions)   Elapsed Days: 22   Technique: VMAT                      Radiation Treatments       No active radiation treatments to show.          Concurrent Chemotherapy:  Treatment Plans       Name Type Plan Dates Plan Provider         Active    Pembrolizumab + PACLitaxel / CARBOplatin followed by Pembrolizumab + DOXOrubicin / Cyclophosphamide followed by Pembrolizumab, 21 Day Cycles Oncology Treatment  2/15/2023 - Present Te Knight MD                    CTCAE Toxicity Overview:   Toxicity Assessment          11/15/2023    14:04 11/21/2023    09:40 11/29/2023    09:26 12/6/2023    10:15   Toxicity Assessment   Treatment Site Breast Breast Breast Breast   Anorexia Grade 0 Grade 0 Grade 0 Grade 0   Anxiety Grade 0 Grade 0 Grade 0 Grade 0   Dehydration Grade 0 Grade 0 Grade 0 Grade 0   Depression Grade 0 Grade 0 Grade 0 Grade 0   Dermatitis Radiation Grade 0 Grade 0 Grade 0       udderly smooth 2 times per day Grade 1   Diarrhea Grade 0 Grade 0 Grade 0 Grade 0   Fatigue Grade 0 Grade 1       pushes through the day Grade 1 Grade 0   Fibrosis Deep Connective Tissue Grade 0 Grade 0 Grade 0 Grade 0   Fracture Grade 0 Grade 0 Grade 0 Grade 0   Nausea Grade 0 Grade 1 Grade 0 Grade 0   Pain Grade 1       hip pain 4/10 Grade 1       body baseline aches Grade 1       baseline body aches Grade 0   Treatment Related Secondary Malignancy Grade 0 Grade 0 Grade 0 Grade 0   Tumor Pain Grade 0 Grade 0 Grade 0 Grade 0   Vomiting Grade 0 Grade 0 Grade 0 Grade 0   Abdominal Pain Grade 0  Grade 0    Dysphagia Grade 0  Grade 0    Esophagitis Grade 0  Grade 0    Gastric Hemorrhage Grade 0  Grade 0    Mucositis Oral Grade 0  Grade 0    Dry Mouth Grade 0 Grade 0 Grade 0 Grade 0   Breast Infection Grade 0 Grade 0 Grade 0 Grade 0   Seroma Grade 0 Grade 0 Grade 0 Grade 0   Joint Range of Motion Decreased Grade 0 Grade 0 Grade 0 Grade 0   Joint Range of Motion Decreased Lumbar Spine Grade 0 Grade 0 Grade 0 Grade 0   Brachial Plexopathy Grade 0 Grade 0 Grade 0 Grade 0   Breast Atrophy Grade 0 Grade 0  Grade 0 Grade 0   Breast Pain Grade 0 Grade 0 Grade 0 Grade 0   Nipple Deformity Grade 0 Grade 0 Grade 0 Grade 0   Pneumonitis Grade 0 Grade 0 Grade 0    Edema Limbs Grade 0 Grade 0 Grade 0 Grade 0   Lymphedema Grade 0 Grade 0 Grade 0 Grade 0   Thromboembolic Event Grade 0 Grade 0 Grade 0 Grade 0   Hot Flashes Grade 0 Grade 0 Grade 0 Grade 0     Clinical Summary:  The patient tolerated this course of radiation therapy relatively well, with no unusual events or unanticipated toxicities. Symptoms during treatment included grade 1 radiation dermatitis treated with topicals.    Patient Disposition:   The patient will be scheduled for follow-up at our clinic on 1/12/24 @ 0177. The patient was encouraged to contact us for any questions or concerns in the interim.    Mango Hernandez MD  [facility] [place]  [facility]  - Department of Radiation Oncology  Phone: [phone]  Fax: [phone]  Epic secure chat preferred / Pager [phone]

## 2023-12-28 ENCOUNTER — INFUSION (OUTPATIENT)
Dept: HEMATOLOGY/ONCOLOGY | Facility: HOSPITAL | Age: 43
End: 2023-12-28
Payer: COMMERCIAL

## 2023-12-28 ENCOUNTER — LAB (OUTPATIENT)
Dept: HEMATOLOGY/ONCOLOGY | Facility: HOSPITAL | Age: 43
End: 2023-12-28
Payer: COMMERCIAL

## 2023-12-28 ENCOUNTER — OFFICE VISIT (OUTPATIENT)
Dept: HEMATOLOGY/ONCOLOGY | Facility: HOSPITAL | Age: 43
End: 2023-12-28
Payer: COMMERCIAL

## 2023-12-28 VITALS
DIASTOLIC BLOOD PRESSURE: 83 MMHG | TEMPERATURE: 97.2 F | BODY MASS INDEX: 24.62 KG/M2 | SYSTOLIC BLOOD PRESSURE: 117 MMHG | WEIGHT: 151.4 LBS | RESPIRATION RATE: 18 BRPM | HEART RATE: 84 BPM

## 2023-12-28 DIAGNOSIS — C50.919 MALIGNANT NEOPLASM OF FEMALE BREAST, UNSPECIFIED ESTROGEN RECEPTOR STATUS, UNSPECIFIED LATERALITY, UNSPECIFIED SITE OF BREAST (MULTI): ICD-10-CM

## 2023-12-28 LAB
ALBUMIN SERPL BCP-MCNC: 4 G/DL (ref 3.4–5)
ALP SERPL-CCNC: 105 U/L (ref 33–110)
ALT SERPL W P-5'-P-CCNC: 19 U/L (ref 7–45)
ANION GAP SERPL CALC-SCNC: 10 MMOL/L (ref 10–20)
AST SERPL W P-5'-P-CCNC: 20 U/L (ref 9–39)
B-HCG SERPL-ACNC: <3 MIU/ML
BASOPHILS # BLD AUTO: 0.02 X10*3/UL (ref 0–0.1)
BASOPHILS NFR BLD AUTO: 0.5 %
BILIRUB SERPL-MCNC: 0.4 MG/DL (ref 0–1.2)
BUN SERPL-MCNC: 10 MG/DL (ref 6–23)
CALCIUM SERPL-MCNC: 9 MG/DL (ref 8.6–10.3)
CHLORIDE SERPL-SCNC: 104 MMOL/L (ref 98–107)
CO2 SERPL-SCNC: 28 MMOL/L (ref 21–32)
CREAT SERPL-MCNC: 0.55 MG/DL (ref 0.5–1.05)
EOSINOPHIL # BLD AUTO: 0.05 X10*3/UL (ref 0–0.7)
EOSINOPHIL NFR BLD AUTO: 1.3 %
ERYTHROCYTE [DISTWIDTH] IN BLOOD BY AUTOMATED COUNT: 15.4 % (ref 11.5–14.5)
GFR SERPL CREATININE-BSD FRML MDRD: >90 ML/MIN/1.73M*2
GLUCOSE SERPL-MCNC: 75 MG/DL (ref 74–99)
HCT VFR BLD AUTO: 35.2 % (ref 36–46)
HGB BLD-MCNC: 12 G/DL (ref 12–16)
IMM GRANULOCYTES # BLD AUTO: 0.02 X10*3/UL (ref 0–0.7)
IMM GRANULOCYTES NFR BLD AUTO: 0.5 % (ref 0–0.9)
LYMPHOCYTES # BLD AUTO: 0.61 X10*3/UL (ref 1.2–4.8)
LYMPHOCYTES NFR BLD AUTO: 15.9 %
MCH RBC QN AUTO: 30.2 PG (ref 26–34)
MCHC RBC AUTO-ENTMCNC: 34.1 G/DL (ref 32–36)
MCV RBC AUTO: 88 FL (ref 80–100)
MONOCYTES # BLD AUTO: 0.49 X10*3/UL (ref 0.1–1)
MONOCYTES NFR BLD AUTO: 12.8 %
NEUTROPHILS # BLD AUTO: 2.65 X10*3/UL (ref 1.2–7.7)
NEUTROPHILS NFR BLD AUTO: 69 %
NRBC BLD-RTO: 0 /100 WBCS (ref 0–0)
PLATELET # BLD AUTO: 168 X10*3/UL (ref 150–450)
POTASSIUM SERPL-SCNC: 4.2 MMOL/L (ref 3.5–5.3)
PROT SERPL-MCNC: 6.6 G/DL (ref 6.4–8.2)
RBC # BLD AUTO: 3.98 X10*6/UL (ref 4–5.2)
SODIUM SERPL-SCNC: 138 MMOL/L (ref 136–145)
WBC # BLD AUTO: 3.8 X10*3/UL (ref 4.4–11.3)

## 2023-12-28 PROCEDURE — 1036F TOBACCO NON-USER: CPT

## 2023-12-28 PROCEDURE — 99215 OFFICE O/P EST HI 40 MIN: CPT | Mod: 25

## 2023-12-28 PROCEDURE — 2500000004 HC RX 250 GENERAL PHARMACY W/ HCPCS (ALT 636 FOR OP/ED): Performed by: PHARMACIST

## 2023-12-28 PROCEDURE — 85025 COMPLETE CBC W/AUTO DIFF WBC: CPT

## 2023-12-28 PROCEDURE — 99215 OFFICE O/P EST HI 40 MIN: CPT

## 2023-12-28 PROCEDURE — 2500000004 HC RX 250 GENERAL PHARMACY W/ HCPCS (ALT 636 FOR OP/ED): Performed by: INTERNAL MEDICINE

## 2023-12-28 PROCEDURE — 84702 CHORIONIC GONADOTROPIN TEST: CPT

## 2023-12-28 PROCEDURE — 96523 IRRIG DRUG DELIVERY DEVICE: CPT

## 2023-12-28 PROCEDURE — 96413 CHEMO IV INFUSION 1 HR: CPT

## 2023-12-28 PROCEDURE — 80053 COMPREHEN METABOLIC PANEL: CPT

## 2023-12-28 RX ORDER — HEPARIN 100 UNIT/ML
500 SYRINGE INTRAVENOUS AS NEEDED
Status: DISCONTINUED | OUTPATIENT
Start: 2023-12-28 | End: 2023-12-28 | Stop reason: HOSPADM

## 2023-12-28 RX ORDER — HEPARIN 100 UNIT/ML
500 SYRINGE INTRAVENOUS AS NEEDED
Status: CANCELLED | OUTPATIENT
Start: 2023-12-28

## 2023-12-28 RX ORDER — HEPARIN 100 UNIT/ML
SYRINGE INTRAVENOUS
Status: DISCONTINUED
Start: 2023-12-28 | End: 2023-12-28 | Stop reason: HOSPADM

## 2023-12-28 RX ORDER — HEPARIN 100 UNIT/ML
500 SYRINGE INTRAVENOUS AS NEEDED
OUTPATIENT
Start: 2023-12-28

## 2023-12-28 RX ORDER — HEPARIN SODIUM,PORCINE/PF 10 UNIT/ML
50 SYRINGE (ML) INTRAVENOUS AS NEEDED
Status: CANCELLED | OUTPATIENT
Start: 2023-12-28

## 2023-12-28 RX ORDER — HEPARIN SODIUM,PORCINE/PF 10 UNIT/ML
50 SYRINGE (ML) INTRAVENOUS AS NEEDED
OUTPATIENT
Start: 2023-12-28

## 2023-12-28 RX ADMIN — SODIUM CHLORIDE 200 MG: 9 INJECTION, SOLUTION INTRAVENOUS at 12:46

## 2023-12-28 RX ADMIN — HEPARIN 500 UNITS: 100 SYRINGE at 13:36

## 2023-12-28 NOTE — PROGRESS NOTES
Patient ID: Jose Honeycutt is a 43 y.o. female.  MRN: [id number]  : 1980  The patient presents to clinic today for her history of breast cancer.     Cancer Staging   Malignant neoplasm of female breast (CMS/HCC)  Staging form: Breast, AJCC 8th Edition  - Clinical stage from 2023: Stage IIB (cT1c, cN1(f), cM0, G3, ER-, OH-, HER2-) - Signed by Mango Hernandez MD on 10/11/2023  - Pathologic stage from 2023: No Stage Recommended (ypT0, pN0(sn), cM0, G3, ER-, OH-, HER2-) - Signed by Mango Hernandez MD on 10/11/2023      Diagnostic/Therapeutic History:  -1/10/23: Bilateral screening mammogram showed a 1.2 cm density in right breast, as well as a 7mm intramammary LN.  -23: Dx MG/US confirmed a 1.1 x 0.9 x 1.1 cm hypoechoic mass at 8:00, 6 cm FN. At 9:00, 6 cm FN, a 0.5 cm mass noted, possible an intramammary LN. The right axillary LN’s appeared normal.  -22: US-guided CNB of 8:00 mass showed IDC, grade 3, ER/OH-negative Her2-negative (1+ IHC). 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  -Genetic testing performed, negative for pathogenic mutations.  -Neoadjuvant chemoimmunotherapy (KEYNOTE-522 regimen) initiated 23: Pembrolizumab with carboplatin/paclitaxel, followed by doxorubicin/cyclophosphamide. Completed 23.  -23: Right lumpectomy/SLNBx performed. No residual carcinoma noted. 0/4 LN's (one sentinel node) involved. ypT0 ypN0.  - Adjuvant Pembrolizumab -10/19/23 - 2023 (completed 4 cycles adj setting: extended Keynote 522 study that only showed the 5-year EFS rate was 92.2% with pembrolizumab vs 88.2% with placebo in patients who achieved pCR , hence it may be reasonable to discontinue Pembrolizumab)   - Radiation therapy -Right whole breast radiation therapy and [place] irradiation 4256 cGy in 16 fraction with right tumor bed boost 1000 cGy in 4 fractions started 23 -     History of Present Illness (HPI)/Interval  History:    Here for cycle 4 of adjuvant Pembrolizumab.   XRT completed 12/11/2023, peeling breast and itchy skin.   Her arthritic pain is improved some, still having it in MCP joints and right hip but shoulders are feeling better.   She is concerned about ~10 lb weight gain in last month, has increased her exercise routine and is getting well.   Denied any rash , no diarrhea , no shortness of breath , minimal fatigue after radiation but not limiting her activities. Sees Dr. Fuchs integrative medicine , getting acupuncture , not on any supplements.   Review of Systems:  14-point ROS otherwise negative, as per HPI/Interval History.    Past Medical History:   Diagnosis Date    Anxiety     Bacterial vaginitis 03/21/2023    Breast cancer (CMS/HCC)     Herpes simplex vulvovaginitis 03/21/2023    Vaginal discharge 03/21/2023    Viral illness 03/21/2023     Patient Active Problem List   Diagnosis    Malignant neoplasm of female breast (CMS/HCC)    Right hip pain        Past Surgical History:   Procedure Laterality Date    BREAST LUMPECTOMY Right        Social History     Socioeconomic History    Marital status:      Spouse name: Not on file    Number of children: Not on file    Years of education: Not on file    Highest education level: Not on file   Occupational History    Not on file   Tobacco Use    Smoking status: Never    Smokeless tobacco: Never   Vaping Use    Vaping Use: Never used   Substance and Sexual Activity    Alcohol use: Never    Drug use: Never    Sexual activity: Not on file   Other Topics Concern    Not on file   Social History Narrative    Not on file     Social Determinants of Health     Financial Resource Strain: Not on file   Food Insecurity: No Food Insecurity (11/15/2023)    Hunger Vital Sign     Worried About Running Out of Food in the Last Year: Never true     Ran Out of Food in the Last Year: Never true   Transportation Needs: Not on file   Physical Activity: Not on file   Stress: Not on  file   Social Connections: Not on file   Intimate Partner Violence: Not on file   Housing Stability: Not on file       Family History   Problem Relation Name Age of Onset    Diabetes Father      Prostate cancer Father      Diabetes Other Grnadmother        Allergies:   Allergies   Allergen Reactions    Sulfamethoxazole Hives         Current Outpatient Medications:     EPINEPHrine 0.3 mg/0.3 mL injection syringe, use as directed, Disp: , Rfl:     loratadine (Claritin) 10 mg tablet, Take 1 tablet (10 mg) by mouth once daily., Disp: , Rfl:      Objective    BSA: There is no height or weight on file to calculate BSA.  There were no vitals taken for this visit.    ECOG Score: 0- Fully active, able to carry on all pre-disease performance w/o restriction.    Physical Exam  Vitals reviewed.   Constitutional:       General: She is awake. She is not in acute distress.     Appearance: Normal appearance. She is not ill-appearing.   HENT:      Mouth/Throat:      Pharynx: Oropharynx is clear. No oropharyngeal exudate.   Eyes:      General: No scleral icterus.     Conjunctiva/sclera: Conjunctivae normal.   Neck:      Trachea: Trachea and phonation normal. No tracheal tenderness.   Cardiovascular:      Rate and Rhythm: Normal rate and regular rhythm.      Pulses: Normal pulses.      Heart sounds: No murmur heard.     No friction rub. No gallop.   Pulmonary:      Effort: Pulmonary effort is normal. No respiratory distress.      Breath sounds: Normal breath sounds. No stridor. No wheezing, rhonchi or rales.   Chest:      Chest wall: No mass, lacerations, deformity or tenderness.   Breasts:     Right: Skin change (hyper and hypopigmentation to the nipple areolar complex 2/2 xrt with some dry skin and no longer peeling) present. No swelling, mass, nipple discharge or tenderness.      Left: No swelling, mass, nipple discharge, skin change or tenderness.      Comments: S/p right lumpectomy   Abdominal:      General: Abdomen is flat.  Bowel sounds are normal. There is no distension.      Palpations: Abdomen is soft. There is no mass.      Tenderness: There is no abdominal tenderness.   Musculoskeletal:         General: No swelling or deformity.      Right lower leg: No edema.      Left lower leg: No edema.   Lymphadenopathy:      Cervical: No cervical adenopathy.      Upper Body:      Right upper body: No supraclavicular, axillary or pectoral adenopathy.      Left upper body: No supraclavicular, axillary or pectoral adenopathy.   Skin:     General: Skin is warm and dry.      Coloration: Skin is not jaundiced.      Findings: No lesion or rash.   Neurological:      General: No focal deficit present.      Mental Status: She is alert and oriented to person, place, and time.      Motor: No weakness.      Gait: Gait normal.   Psychiatric:         Mood and Affect: Mood normal.         Thought Content: Thought content normal.         Judgment: Judgment normal.     Rt breast : Scar + , no redness     Laboratory Data:  Lab Results   Component Value Date    WBC 2.9 (L) 11/30/2023    HGB 11.6 (L) 11/30/2023    HCT 34.8 (L) 11/30/2023    MCV 89 11/30/2023     (L) 11/30/2023       Chemistry    Lab Results   Component Value Date/Time     11/30/2023 1145    K 4.1 11/30/2023 1145     11/30/2023 1145    CO2 28 11/30/2023 1145    BUN 16 11/30/2023 1145    CREATININE 0.48 (L) 11/30/2023 1145    Lab Results   Component Value Date/Time    CALCIUM 9.1 11/30/2023 1145    ALKPHOS 90 11/30/2023 1145    AST 18 11/30/2023 1145    ALT 18 11/30/2023 1145    BILITOT 0.2 11/30/2023 1145                Assessment/Plan:  Jose Honeycutt is a 43 y.o. female with a history of right triple-negative breast cancer, who presents today for follow-up evaluation.     Malignant neoplasm of female breast (CMS/HCC)  - S/p lumpectomy/SLNBx. She has healed well from surgery.  - C4D1 adj pembrolizumab - given her reluctance and inflammatory  arthritis, discussion was at last OV  that this would be her final treatment at this time. Pt in agreement with plan   - Completed XRT with expected skin changes, use good moisturizer for skin   - Dicussed tentatively follow ups going forward. Will see Dr. Knight at [facility] next, then would like to have routine follow ups in [place]. She would benefit from EOF visit with Ema in the future     -Grade 1 irAE - inflammatory arthritis   Predominantly involving small joints of hands and feet and Rt hip , Ibuprofen helps .   Will stop adjuvant Keytruda after cycle 4 per patient preference .  - Recommend continuing using NSAID's and PT .     - Mild thrombocytopenia    Resolved: xrt induced?     - Monitor weight for now, suspect may actually be due to increased muscle mass. No overt swelling or changes in bowel habits or diet     Disposition.  - RTC in 8 weeks with Dr. Te Knight MD     - She was given our contact information and instructed to call with concerns/questions in the interim.    No orders of the defined types were placed in this encounter.

## 2024-01-03 ENCOUNTER — APPOINTMENT (OUTPATIENT)
Dept: INTEGRATIVE MEDICINE | Facility: CLINIC | Age: 44
End: 2024-01-03
Payer: COMMERCIAL

## 2024-01-10 ENCOUNTER — ALLIED HEALTH (OUTPATIENT)
Dept: INTEGRATIVE MEDICINE | Facility: CLINIC | Age: 44
End: 2024-01-10
Payer: COMMERCIAL

## 2024-01-10 DIAGNOSIS — R63.0 POOR APPETITE: ICD-10-CM

## 2024-01-10 DIAGNOSIS — C50.511 MALIGNANT NEOPLASM OF LOWER-OUTER QUADRANT OF RIGHT BREAST OF FEMALE, ESTROGEN RECEPTOR NEGATIVE (MULTI): Primary | ICD-10-CM

## 2024-01-10 DIAGNOSIS — R60.9 SWELLING: ICD-10-CM

## 2024-01-10 DIAGNOSIS — M25.559 ARTHRALGIA OF HIP, UNSPECIFIED LATERALITY: ICD-10-CM

## 2024-01-10 DIAGNOSIS — Z17.1 MALIGNANT NEOPLASM OF LOWER-OUTER QUADRANT OF RIGHT BREAST OF FEMALE, ESTROGEN RECEPTOR NEGATIVE (MULTI): Primary | ICD-10-CM

## 2024-01-10 NOTE — PROGRESS NOTES
Patient ID: Jose Honeycutt is a 44 y.o. female.  Referring Physician: No referring provider defined for this encounter.  Primary Care Provider: No Assigned PCP Generic Provider, MD    CANCER HISTORY:   44 yo woman with R breast IDC, triple negative  Genetics negative  Patient has 2 children, and notes completion of family building.      PMH: SVT/Afib, anxiety, triple negative breast CA   Psych History: Post partum depression/anxiety following birth of son,   generalized anxiety at baseline, is actively doing talk therapy   Surg Hx: catheter ablation 2020 for SVT      Treatment History:   Neoadjuvant carboplatin, paclitaxel, pembrolizumab; followed by doxorubicin,   cyclophosphamide, pembrolizumab (per KEYNOTE-522 protocol):      C1D1 2/16/23 (pembrolizumab was received, but developed a grade 3 infusion   reaction to paclitaxel).   - Switched to Abraxane/carboplatin for week #2. No issues.   - Had possible reaction versus anxiety attack during week #3. Treatment was   held.   Plan to have re-evaluation with surgery and adjuvant AC/keytruda.   Last immunotherapy end of this month     now s/p surgery  Radiation - causing lots of fatigue, pain     History of Present Illness Consulted by: Dr. Knight  For: breast cancer     Started AC - c/b fatigue but o/w doing well - feeling good now overall  Bloating but o/w doing well, contd and trying to drink tea and eat smaller portions - improved with similase     Chief complaints and symptoms:  Managing chemo side effects without pharmaceuticals  Menstrual irreg/premature menopause - very limited right now - has some anxiety around periods     Anxiety and diff sleeping - improved significantly but cutting out caffeine, being active - doing well now  waking up in middle of night  improved anxiety and insomnia - ok now     Arthralgias - fingers, shoulders, hurts.  Started a few weeks ago, foot and hip for 3 months     More nausea now, drinking kathe tea     Hot flashes - improved,  almost gone     Fatigue - doing well, has for a few days after chemo, now done, still tired        GERD symptoms with chemo, now with bloating  Occas chest pains, burning feeling     Interested in: acupuncture, Chinese herbs, guided imagery, nutrition, stress management     Diet: Eating very well overall, cut out fast food/junk food  More plant based - contd  Doing well overall, has made food changes we suggested     PA: walking 2-3 times/week, using treadmill, not steady right now     Sleep:Diff staying asleep, racing thoughts usually middle of the night - now sleeping better  Doing morning meditation, sleep hygiene  Journals prior to sleeping  Sleeping well now, cut out caffeine  no diff falling asleep but diff staying asleep     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   On all of the above she noted not experiencing any PMS or anxiety. She is   currently only taking gerry root/borage oil and magnesium.      CoQ10 100 mg/day  Vit D3 1000 IU/day  Fish Oil  Gerry Root  Mag  claritin  Lorazepam as needed  Protonix   Consulted by: Dr. Knight  For: breast cancer     Started AC - c/b fatigue but o/w doing well - feeling good now overall  Bloating but o/w doing well, contd and trying to drink tea and eat smaller portions - improved with similase    ROS:  no ha, visual symptoms, hearing loss  no sob, chest pain, palp  ROS o/w non contributory, please see HPI    Objective    BSA: There is no height or weight on file to calculate BSA.  There were no vitals taken for this visit.    PHYSICAL EXAM:  NAD, awake/alert  HEENT, NCAT, OP clear, no oral lesions  CTA bilat  RRR no mgr  Abd  soft/nt/nd+bs  No c/c/e/ttp  Motor/sensory intact, CN 2-12 intact     RESULTS:  Lab Results   Component Value Date    WBC 3.8 (L) 12/28/2023    HGB 12.0 12/28/2023    HCT 35.2 (L) 12/28/2023     12/28/2023    CREATININE 0.55 12/28/2023    AST 20 12/28/2023       Assessment/Plan   Cancer Staging   Malignant neoplasm of female breast (CMS/HCC)  Staging form: Breast, AJCC 8th Edition  - Clinical stage from 1/19/2023: Stage IIB (cT1c, cN1(f), cM0, G3, ER-, [place]-, HER2-) - Signed by Mango Hernandez MD on 10/11/2023  - Pathologic stage from 8/25/2023: No Stage Recommended (ypT0, pN0(sn), cM0, G3, ER-, W[place]-, HER2-) - Signed by Mango Hernandez MD on 10/11/2023      CANCER SPECIFIC RECCS:  44 yo woman with triple negative breast cancer on neoadj carbo/abraxane/keytruda f/b surgery then AC/keytruda as plan.  Doing well on AC/pembro chemo      Breast cancer:  [employer] plant based diet  Limit sugar  Limit alcohol  Anticancer Lifestyle Program  Read: Anticancer Living     VIOME testing     Arthralgias - using turmeric in food  Consider motrin prn  IMO likely related to immunotherapy - does not need treatment now, ending immunotherapy end of the month, but if worsens then can discuss further with Dr. Knight.  Cont acupuncture  [place] 3  Yoga  Massage     Exercise 30 min/day 5 days a week, vary by balance, cardio and resistance training  Continue walking now  Some fatigue - consider American ginseng 2 grams/day     Supplements to consider:  Melatonin 1-3 mg at night 1 hr prior to sleep - not taking  Vitamin D3 1276-7010 IU/day  Omega 3 supplementation (nordic naturals)     Sleep - try to get over 7 hours/night  Limit TV or electronics at night  Limit caffeine intake and only to the morning  Get exposure to light in the morning if possible  Meditation and/or breathing exercises in the morning are helpful  5 min breathing exercises: Alternate Nostril Breathing and Derek Corby breathing  Continue walking middle of the day      Sleep hygiene - should try to sleep by 10 pm  Limit TV or exposure to light at night including cell phones  Limit caffeine to AM only if needed (e.g. coffee)  Morning exposure to light, getting outside or bright white light in the morning  [place] Tea  Consider melatonin   Consider massage  Consider similase (integrative therapeutics) for bloating     Irreg periods - likely from chemo, f/b endocrine - not having now     Anxiety - has tried SSRI in past without relief. Manifested with sleep interruption and cardiac symptoms.  - Patient connected with [facility] for peer support   - Patient currently seeing therapist for talk therapy   - Referral made to onco-psychiatry to explore other medication management   Doing better overall, meditating in am  strategies -- maybe beta blocker propranolol/atenolol?   cognitive behavioral therapy - seeing her as well, has improved symptoms now     Hot Flashes: improved overall, some returning last few weeks  Acupuncture - have an appt - next month, symptom management clinic but seeing someone else for a few weeks, helping  Yoga is helpful as is stress management  Consider Acteane (Boiron, homeopathic)  Relazen (Swedish Herb)     F/u in symptom management     SYMPTOM MANAGEMENT:   Symptoms Managed:  Hot Flashes - resumed but decreased since last visit, a few a day now, less intense but just as freq     Nausea - improved     Fatigue - gone     Anxiety/Insomnia - intermittent, starting radiation soon     Pain - joint stiffness in knees/hips, continued now but overall improved  hip pain R and R foot pain nael after walking (not during)  now overall improved otherwise  increased activity     Bloating - eating anything, trying smaller portions, much improved     Natural Products utilized:  CoQ10 100 mg/day  Vit D3 1000 IU/day  Fish Oil  Danielle Root  Mag  claritin  Lorazepam as needed  Protonix   Similase     Referrals: Yoga  Massage     Recommendations:  Continue CBT  Suggest  trying essential oils - lavender or peppermint in diffuser for hot flashes  Acteane or relazen  Consider meds like cymbalta for pain and hot flashes o/w     Follow Up:  Symptom Management: Weekly    SYMPTOM MANAGEMENT:  Integrative Oncology Symptom Management:     The integrative oncology symptom management clinic offers supervised care of cancer patients using Integrative Modalities billed to insurance using NCCN and SIO/ASCO guideline-driven practices.  ESAS is obtained prior to and after each treatment by practitioner     Symptoms Managed:  Hot Flashes - resumed but decreased since last visit, a few a day now, less intense but just as freq     Nausea - improved     Fatigue - gone     Anxiety/Insomnia - intermittent, starting radiation soon     Pain - joint stiffness in knees/hips, continued now but overall improved  hip pain R and R foot pain nael after walking (not during)  now overall improved otherwise  increased activity     Bloating - eating anything, trying smaller portions, much improved     Natural Products utilized:  CoQ10 100 mg/day  Vit D3 1000 IU/day  Fish Oil  Danielle Root  Mag  claritin  Lorazepam as needed  Protonix   Similase     Integrative Treatment: Acupuncture     Session #: 11  Frequency: Weekly     Referrals: Yoga  Massage     Recommendations:  Continue CBT  Suggest trying essential oils - lavender or peppermint in diffuser for hot flashes  Acteane or relazen  Consider meds like cymbalta for pain and hot flashes o/w     Follow Up:  Symptom Management: Weekly  Integrative Oncology: 3 mo     I have personally seen the patient and supervised the treatment by the integrative practitioner during this visit.  Pt had symptoms discussed and I was present for the patient's 45 minutes of direct patient care.  Joseph Fuchs MD

## 2024-01-12 ENCOUNTER — APPOINTMENT (OUTPATIENT)
Dept: RADIATION ONCOLOGY | Facility: CLINIC | Age: 44
End: 2024-01-12
Payer: COMMERCIAL

## 2024-01-15 ENCOUNTER — HOSPITAL ENCOUNTER (OUTPATIENT)
Dept: RADIATION ONCOLOGY | Facility: CLINIC | Age: 44
Setting detail: RADIATION/ONCOLOGY SERIES
Discharge: HOME | End: 2024-01-15
Payer: COMMERCIAL

## 2024-01-15 VITALS
OXYGEN SATURATION: 98 % | HEART RATE: 84 BPM | DIASTOLIC BLOOD PRESSURE: 90 MMHG | BODY MASS INDEX: 24.56 KG/M2 | TEMPERATURE: 96.6 F | RESPIRATION RATE: 18 BRPM | SYSTOLIC BLOOD PRESSURE: 127 MMHG | WEIGHT: 151.01 LBS

## 2024-01-15 DIAGNOSIS — C50.511 MALIGNANT NEOPLASM OF LOWER-OUTER QUADRANT OF RIGHT FEMALE BREAST, UNSPECIFIED ESTROGEN RECEPTOR STATUS (MULTI): ICD-10-CM

## 2024-01-15 PROCEDURE — 99024 POSTOP FOLLOW-UP VISIT: CPT | Performed by: STUDENT IN AN ORGANIZED HEALTH CARE EDUCATION/TRAINING PROGRAM

## 2024-01-15 PROCEDURE — 99214 OFFICE O/P EST MOD 30 MIN: CPT | Performed by: STUDENT IN AN ORGANIZED HEALTH CARE EDUCATION/TRAINING PROGRAM

## 2024-01-15 ASSESSMENT — ENCOUNTER SYMPTOMS
NAUSEA: 0
CHILLS: 0
FREQUENCY: 0
FEVER: 0
DEPRESSION: 0
SHORTNESS OF BREATH: 0
TROUBLE SWALLOWING: 0
DIARRHEA: 0
FATIGUE: 0
ABDOMINAL PAIN: 0
ADENOPATHY: 0
VOMITING: 0
LOSS OF SENSATION IN FEET: 0
OCCASIONAL FEELINGS OF UNSTEADINESS: 0
LEG SWELLING: 0

## 2024-01-15 ASSESSMENT — PAIN SCALES - GENERAL: PAINLEVEL: 0-NO PAIN

## 2024-01-15 ASSESSMENT — COLUMBIA-SUICIDE SEVERITY RATING SCALE - C-SSRS: 1. IN THE PAST MONTH, HAVE YOU WISHED YOU WERE DEAD OR WISHED YOU COULD GO TO SLEEP AND NOT WAKE UP?: NO

## 2024-01-15 ASSESSMENT — PATIENT HEALTH QUESTIONNAIRE - PHQ9
SUM OF ALL RESPONSES TO PHQ9 QUESTIONS 1 AND 2: 0
1. LITTLE INTEREST OR PLEASURE IN DOING THINGS: NOT AT ALL
2. FEELING DOWN, DEPRESSED OR HOPELESS: NOT AT ALL

## 2024-01-15 NOTE — ADDENDUM NOTE
Encounter addended by: Rose Mary Laureano RN on: 1/15/2024 4:15 PM   Actions taken: Patient Education assessment filed, Patient Education documented on, Clinical Note Signed

## 2024-01-15 NOTE — PROGRESS NOTES
Pt is in for follow up visit. She denies pain. Pt states the nipple area is still dry. Pt was given more cream. She is scheduled to see Eugene 2/22/24, Gomez with mamm 3/12/24, and is having port removed 1/23/24. Prior to appt ending patient was escorted to scheduling desk to make 6 month follow up appt. No further concerns for nursing at this time.

## 2024-01-15 NOTE — PROGRESS NOTES
Radiation Oncology Follow-Up    Patient Name:  Jose Honeycutt  MRN:  [id number]  :  1980    Referring Provider: Mango Hernandez MD  Primary Care Provider: No Assigned PCP Generic Provider, MD  Care Team: Patient Care Team:  No Assigned Pcp Generic Provider, MD as PCP - General (Family Medicine)  Joseph Fuchs MD as Consulting Physician (Hematology and Oncology)    Date of Service: 1/15/2024     SUBJECTIVE  History of Present Illness:   Jose Honeycutt is a 44 y.o. female who was previously seen at the [facility] Department of Radiation Oncology for node positive, triple negative breast cancer of the right breast.      #) Right breast IDC, cT1c cN1, ypT0 ypN0 (sn), grade 3, ER negative, MN negative, HER2 negative (1+), s/p neoadjuvant chemo-immunotherapy per Keynote-522 and right partial mastectomy and SLN biopsy, status post radiation therapy to the right breast and regional nodes, adjuvant pembrolizumab    Ms. Honeycutt is a 43-year-old premenopausal female that presented with a history of right breast palpable lump during her menstrual cycle that came and went away.  The patient noted it returned in November-2022.  The patient denies any discharge from the nipple, skin changes or lumps in the axilla.  The patient has a history of screening mammograms with a negative mammogram in .  The patient had mammogram on January 10, 2023 which showed a 7 mm right upper outer quadrant at anterior depth mammary lymph node and a 12 mm right outer mid depth asymmetry not previously seen.  The patient had ultrasound of the right breast and axilla on 2023 which revealed a 1.1 cm hypoechoic mass at 8:00, 6 cm from the nipple and additional 5 mm hypoechoic mass at 9:00, 6 cm from the nipple likely representing the lymph node.  The patient had right breast mass and lymph node biopsy on 2023 which revealed invasive ductal carcinoma, grade 3, triple negative with involvement of the  right intramammary lymph node with ductal carcinoma.     The patient met with medical oncology and initiated neoadjuvant chemo-immunotherapy completing 4 cycles of carboplatin/paclitaxel/pembrolizumab from February through May 2023 with infusion reaction leading to initiation of Abraxane and held treatment for possible reaction during week 3.  The patient then completed doxorubicin/cyclophosphamide/pembrolizumab for 4 cycles from late May through July 2023.  Post neoadjuvant treatment right mammogram and ultrasound on 7/27/2023 revealed interval decrease in the size of the lesion of the lateral right breast at mid to posterior depth and decrease in the size of the biopsied intramammary lymph node now measuring 3 mm.     The patient underwent right partial mastectomy and sentinel lymph node biopsy on 8/25/2023 which showed complete pathological response to neoadjuvant therapy, ypT0 ypN0 (sn), with 4 lymph nodes removed by dual tracer with previously noted biopsy site changes.  She completed right whole breast radiation therapy and [place] irradiation 4256 cGy in 16 fraction with right tumor bed boost 1000 cGy in 4 fractions on 12/12/2023. She is currently on adjuvant pembrolizumab.     1/15/2024: Cycle 13 this week of pembrolizumab. Energy level back to normal. Improved joints. Itching of the skin and nipple peeling, using udderly smooth. No issues with swallowing.  No shortness of breath.  The patient is currently more active walking 2-1/2 miles a day.  Patient is being scheduled for port removal.    G 6 P 2  Onset of menses - 13   Onset of menopause -premenopausal, May 2023  Post-menopausal bleeding - N/A  OCP use: 10 years  Estrogen replacement: Denies    Pathology:  8/25/2023 - A.  RIGHT BREAST LUMPECTOMY (LONG STITCH LATERAL, SHORT STITCH SUPERIOR):-- NO RESIDUAL CARCINOMA PRESENT AFTER NEOADJUVANT CHEMOTHERAPY.-- ONE LYMPH NODE, NEGATIVE FOR CARCINOMA (0/1).-- PREVIOUS BIOPSY SITE CHANGES, TWO SITES.  B.   ADDITIONAL SUPERIOR BREAST TISSUE (R):-- NEGATIVE FOR CARCINOMA.  C.  RIGHT SENTINEL LYMPH NODE # 1 (COUNT = 250):-- ONE LYMPH NODE, NEGATIVE FOR CARCINOMA (0/1).  D.  ADDITIONAL LYMPH NODE:-- TWO LYMPH NODES, NEGATIVE FOR CARCINOMA (0/2).  ypT0 ypN0 (sn)     2023 - A.  RIGHT BREAST MASS 8 O'CLOCK, CORE NEEDLE BIOPSY: -- INVASIVE DUCTAL CARCINOMA, GRADE 3, measures up to 0.4 cm in greatest dimension.  ER negative, LA negative, HER2 negative (1+). B.  RIGHT BREAST MASS 9 O'CLOCK, CORE NEEDLE BIOPSY:--  LYMPH NODE INVOLVED BY DUCTAL CARCINOMA.    Disease Associated Genomics:  2023 - Genetic work up negative     Prior Systemic Therapies:  Keynote-522  10/19/2023-2023: Pembro q3 weeks  2023 -2023: Doxorubicin/cyclophosphamide/pembrolizumab for 4 cycles  2023-2023: Carboplatin/paclitaxel/pembrolizumab for 4 cycles  Grade 3 infusion reaction to paclitaxel - Switched to Abraxane/carboplatin for week #2. No issues. - Had possible reaction versus anxiety attack during week #3. Treatment was held.    Prior Surgeries:  2023: right breast partial mastectomy and sentinel lymph node biopsy    Prior Radiation Therapy:  2023-2023: right whole breast radiation therapy and [place] irradiation 4256 cGy in 16 fraction with right tumor bed boost 1000 cGy in 4 fractions     Imagin2023 - Diagnostic right mammogram and US: There has been interval decrease in size of both the biopsy-proven malignant mass in the central lateral right breast at mid to posterior depth, as well as the biopsy-proven malignant intramammary lymph node. At 8 o'clock 6 cm from the nipple, a tissue marker is seen. It is difficult to differentiate what represents tissue marker versus any residual mass. At 9 o'clock 6 cm from the nipple, the biopsied intramammary lymph node is seen, which measures 0.3 x 0.3 x 0.2 cm, previously measuring 0.5 x 0.3 x 0.5 cm. The tissue marker is seen adjacent to the  intramammary lymph node.     1/12/2023 - US of the right breast and axilla: Right breast at 8 o'clock position, 6 cm from the nipple hypoechoic mass demonstrating lobulated margins. This mass measures approximately 1.1 x 0.9 x 1.1 cm. Right breast at 9 o'clock position 6 cm from the nipple hypoechoic mass measuring 0.5 x 0.3 x 0.5 cm. This demonstrates fatty hilum with vascularity. This likely represents an intramammary lymph node and corresponds to the mammographic abnormality in the superolateral right breast with cortical thickening. Extensive scanning of the right axillary region does not demonstrate suspicious lymph nodes.     1/10/2023 -screening mammogram with tomosynthesis: A 7 x 5 mm right upper outer quadrant anterior to mid depth intramammary lymph node is slightly larger than in November 2021. A 12 mm equal density right outer mid depth focal asymmetry with circumscribed and obscured or indistinct borders was not previously apparent.     11/1/2021 - screening mammogram with tomosynthesis: The breast tissue is heterogeneously dense, which may obscure small masses.   No suspicious masses or calcifications are identified. (BI-RADS 1)     Labs:  None    Treatment Rendered:   Radiation Treatments       Active   No active radiation treatments to show.     Completed   Rt TB boost (Started on 12/6/2023)   Most recent fraction: 250 cGy given on 12/12/2023   Total given: 1,000 cGy / 1,000 cGy  (4 of 4 fractions)   Elapsed Days: 6   Technique: 3-Field Boost        Rt breast (Started on 11/13/2023)   Most recent fraction: 266 cGy given on 12/5/2023   Total given: 4,256 cGy / 4,256 cGy  (16 of 16 fractions)   Elapsed Days: 22   Technique: VMAT                     Review of Systems:   Review of Systems   Constitutional:  Negative for chills, fatigue and fever.   HENT:   Negative for trouble swallowing.    Respiratory:  Negative for shortness of breath.    Cardiovascular:  Negative for chest pain and leg swelling.    Gastrointestinal:  Negative for abdominal pain, diarrhea, nausea and vomiting.   Genitourinary:  Negative for frequency.    Skin:  Positive for itching (Right breast itching) and rash (Right breast dermatitis).   Hematological:  Negative for adenopathy.     The patient's current pain level was assessed.  They report currently having a pain of 0 out of 10.  They feel their pain is under control without the use of pain medications.    Performance Status:   The Karnofsky performance scale today is 90, Able to carry on normal activity; minor signs or symptoms of disease (ECOG equivalent 0).        OBJECTIVE  Vital Signs:  /90 (BP Location: Left arm, Patient Position: Sitting)   Pulse 84   Temp 35.9 °C (96.6 °F)   Resp 18   Wt 68.5 kg (151 lb 0.2 oz)   SpO2 98%   BMI 24.56 kg/m²    Physical Exam:  Physical Exam  Vitals and nursing note reviewed. Exam conducted with a chaperone present.   Constitutional:       Appearance: Normal appearance.   HENT:      Head: Normocephalic.   Eyes:      Extraocular Movements: Extraocular movements intact.   Cardiovascular:      Rate and Rhythm: Normal rate and regular rhythm.   Pulmonary:      Effort: Pulmonary effort is normal.      Breath sounds: Normal breath sounds.   Chest:   Breasts:     Right: Skin change present. No swelling, mass or tenderness.      Left: Normal. No mass, nipple discharge, skin change or tenderness.          Comments: Radiation dermatitis grade 1 changes to the right breast with peeling of the skin of the breast and nipple.  Noted inflammation of the pores and follicles.  Stable postsurgical changes of the right outer lower lumpectomy site.   Musculoskeletal:      Right upper arm: No edema.      Left upper arm: No edema.      Right forearm: No edema.      Left forearm: No edema.   Lymphadenopathy:      Upper Body:      Right upper body: No supraclavicular, axillary or pectoral adenopathy.      Left upper body: No supraclavicular, axillary or  pectoral adenopathy.   Skin:     General: Skin is warm.      Findings: Rash (Grade 1 radiation dermatitis of the right breast with peeling of the skin and nipple) present.   Neurological:      Mental Status: She is alert.             ASSESSMENT:  Jose Honeycutt is a 44 y.o. female with Malignant neoplasm of female breast (CMS/HCC), Clinical: Stage IIB (cT1c, cN1(f), cM0, G3, ER-, [place]-, HER2-)  Malignant neoplasm of female breast (CMS/HCC), Pathologic: No Stage Recommended (ypT0, pN0(sn), cM0, G3, ER-, ME-, HER2-).      Ms. Honeycutt is a 44-year-old premenopausal female with right breast IDC, cT1c cN1, ypT0 ypN0 (sn), grade  3, ER negative, ME negative, HER2 negative (1+), s/p neoadjuvant chemo-immunotherapy per Keynote-522 and right partial mastectomy and SLN biopsy, status post right whole breast radiation therapy and [place] irradiation 4256 cGy in 16 fraction with right tumor bed boost 1000 cGy in 4 fractions , status post adjuvant pembro.    The patient notes improvement in her fatigue.  The patient still has grade 1 dermatitis of the right breast and is using Udderly smooth.  The patient is scheduled to follow-up with medical oncology, breast surgery and has mammogram scheduled for March 2024.    The patient will be scheduled for follow-up in 6 months long-term radiation assessment.      PLAN:    # Right breast IDC, cT1c cN1, ypT0 ypN0 (sn), grade 3, ER negative, ME negative, HER2 negative (1+), s/p neoadjuvant chemo-immunotherapy per Keynote-522 and right partial mastectomy and SLN biopsy with complete pathological response, status post right whole breast radiation therapy and [place] irradiation 4256 cGy in 16 fraction with right tumor bed boost 1000 cGy in 4 fractions , on adjuvant pembro   -Follows with breast surgery in mid March 2024, mammogram ordered same day as follow up  -Following with medical oncology status post adjuvant pembro  -s/p right whole breast radiation therapy and [place]  irradiation 4256 cGy in 16 fraction with right tumor bed boost 1000 cGy in 4 fractions  -s/p right breast partial mastectomy and sentinel lymph node biopsy with pCR  -s/p Neoadjuvant chemothereapy    #) Acute toxicities: Grade 1 dermatitis using udderly smooth.    #) Long term toxicities: None    NCCN Guidelines were applicable to guide this patients treatment plan.    A total of 20 minutes were spent face-to-face with the patient, with an additional 10 minutes spent reviewing records including imaging, pathology and physician notes.    Mango Hernandez MD  [facility]  [facility] clinical  - Department of Radiation Oncology  Phone: [phone]  Fax: [phone]  [facility] secure chat preferred / Pager [phone]    Note: This was transcribed using Dragon voice recognition software. Attempts were made to correct any errors; however, errors or omissions may be present.

## 2024-01-17 ENCOUNTER — ALLIED HEALTH (OUTPATIENT)
Dept: INTEGRATIVE MEDICINE | Facility: CLINIC | Age: 44
End: 2024-01-17
Payer: COMMERCIAL

## 2024-01-17 DIAGNOSIS — M25.559 ARTHRALGIA OF HIP, UNSPECIFIED LATERALITY: ICD-10-CM

## 2024-01-17 DIAGNOSIS — Z17.1 MALIGNANT NEOPLASM OF LOWER-OUTER QUADRANT OF RIGHT BREAST OF FEMALE, ESTROGEN RECEPTOR NEGATIVE (MULTI): ICD-10-CM

## 2024-01-17 DIAGNOSIS — R63.0 POOR APPETITE: ICD-10-CM

## 2024-01-17 DIAGNOSIS — C50.511 MALIGNANT NEOPLASM OF LOWER-OUTER QUADRANT OF RIGHT BREAST OF FEMALE, ESTROGEN RECEPTOR NEGATIVE (MULTI): ICD-10-CM

## 2024-01-17 DIAGNOSIS — R60.9 SWELLING: Primary | ICD-10-CM

## 2024-01-17 DIAGNOSIS — M25.50 ARTHRALGIA, UNSPECIFIED JOINT: ICD-10-CM

## 2024-01-17 NOTE — PROGRESS NOTES
Patient ID: Jose Honeycutt is a 44 y.o. female.  Referring Physician: No referring provider defined for this encounter.  Primary Care Provider: No Assigned PCP Generic Provider, MD    CANCER HISTORY:   42 yo woman with R breast IDC, triple negative  Genetics negative  Patient has 2 children, and notes completion of family building.      PMH: SVT/Afib, anxiety, triple negative breast CA   Psych History: Post partum depression/anxiety following birth of son,   generalized anxiety at baseline, is actively doing talk therapy   Surg Hx: catheter ablation 2020 for SVT      Treatment History:   Neoadjuvant carboplatin, paclitaxel, pembrolizumab; followed by doxorubicin,   cyclophosphamide, pembrolizumab (per KEYNOTE-522 protocol):      C1D1 2/16/23 (pembrolizumab was received, but developed a grade 3 infusion   reaction to paclitaxel).   - Switched to Abraxane/carboplatin for week #2. No issues.   - Had possible reaction versus anxiety attack during week #3. Treatment was   held.   Plan to have re-evaluation with surgery and adjuvant AC/keytruda.   Last immunotherapy end of this month     now s/p surgery  Radiation - causing lots of fatigue, pain     History of Present Illness Consulted by: Dr. Knight  For: breast cancer     Started AC - c/b fatigue but o/w doing well - feeling good now overall  Bloating but o/w doing well, contd and trying to drink tea and eat smaller portions - improved with similase     Chief complaints and symptoms:  Managing chemo side effects without pharmaceuticals  Menstrual irreg/premature menopause - very limited right now - has some anxiety around periods     Anxiety and diff sleeping - improved significantly but cutting out caffeine, being active - doing well now  waking up in middle of night  improved anxiety and insomnia - ok now     Arthralgias - fingers, shoulders, hurts.  Started a few weeks ago, foot and hip for 3 months     More nausea now, drinking kathe tea     Hot flashes - improved,  almost gone     Fatigue - doing well, has for a few days after chemo, now done, still tired        GERD symptoms with chemo, now with bloating  Occas chest pains, burning feeling     Interested in: acupuncture, Chinese herbs, guided imagery, nutrition, stress management     Diet: Eating very well overall, cut out fast food/junk food  More plant based - contd  Doing well overall, has made food changes we suggested     PA: walking 2-3 times/week, using treadmill, not steady right now     Sleep:Diff staying asleep, racing thoughts usually middle of the night - now sleeping better  Doing morning meditation, sleep hygiene  Journals prior to sleeping  Sleeping well now, cut out caffeine  no diff falling asleep but diff staying asleep     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   On all of the above she noted not experiencing any PMS or anxiety. She is   currently only taking gerry root/borage oil and magnesium.      CoQ10 100 mg/day  Vit D3 1000 IU/day  Fish Oil  Gerry Root  Mag  claritin  Lorazepam as needed  Protonix   Consulted by: Dr. Knight  For: breast cancer     Started AC - c/b fatigue but o/w doing well - feeling good now overall  Bloating but o/w doing well, contd and trying to drink tea and eat smaller portions - improved with similase    ROS:  no ha, visual symptoms, hearing loss  no sob, chest pain, palp  ROS o/w non contributory, please see HPI    Objective    BSA: There is no height or weight on file to calculate BSA.  There were no vitals taken for this visit.    PHYSICAL EXAM:  NAD, awake/alert  HEENT, NCAT, OP clear, no oral lesions  CTA bilat  RRR no mgr  Abd  soft/nt/nd+bs  No c/c/e/ttp  Motor/sensory intact, CN 2-12 intact     RESULTS:  Lab Results   Component Value Date    WBC 3.8 (L) 12/28/2023    HGB 12.0 12/28/2023    HCT 35.2 (L) 12/28/2023     12/28/2023    CREATININE 0.55 12/28/2023    AST 20 12/28/2023       Assessment/Plan   Cancer Staging   Malignant neoplasm of female breast (CMS/HCC)  Staging form: Breast, AJCC 8th Edition  - Clinical stage from 1/19/2023: Stage IIB (cT1c, cN1(f), cM0, G3, ER-, [place]-, HER2-) - Signed by Mango Hernandez MD on 10/11/2023  - Pathologic stage from 8/25/2023: No Stage Recommended (ypT0, pN0(sn), cM0, G3, ER-, [place]-, HER2-) - Signed by Mango Hernandez MD on 10/11/2023      CANCER SPECIFIC RECCS:  44 yo woman with triple negative breast cancer on neoadj carbo/abraxane/keytruda f/b surgery then AC/keytruda as plan.  Doing well on AC/pembro chemo      Breast cancer:  Whole Foods plant based diet  Limit sugar  Limit alcohol  Anticancer Lifestyle Program  Read: Anticancer Living     VIOME testing     Arthralgias - using turmeric in food  Consider motrin prn  IMO likely related to immunotherapy - does not need treatment now, ending immunotherapy end of the month, but if worsens then can discuss further with Dr. Knight.  Cont acupuncture  [place] [facility]  Yoga  Massage     Exercise 30 min/day 5 days a week, vary by balance, cardio and resistance training  Continue walking now  Some fatigue - consider American ginseng 2 grams/day     Supplements to consider:  Melatonin 1-3 mg at night 1 hr prior to sleep - not taking  Vitamin D3 6468-1954 IU/day  Omega 3 supplementation (nordic naturals)     Sleep - try to get over 7 hours/night  Limit TV or electronics at night  Limit caffeine intake and only to the morning  Get exposure to light in the morning if possible  Meditation and/or breathing exercises in the morning are helpful  5 min breathing exercises: Alternate Nostril Breathing and Derek Corby breathing  Continue walking middle of the day      Sleep hygiene - should try to sleep by 10 pm  Limit TV or exposure to light at night including cell phones  Limit caffeine to AM only if needed (e.g. coffee)  Morning exposure to light, getting outside or bright white light in the morning  Chamomille Tea  Consider melatonin   Consider massage  Consider similase (integrative therapeutics) for bloating     Irreg periods - likely from chemo, f/b endocrine - not having now     Anxiety - has tried SSRI in past without relief. Manifested with sleep interruption and cardiac symptoms.  - Patient connected with [facility] for peer support   - Patient currently seeing therapist for talk therapy   - Referral made to onco-psychiatry to explore other medication management   Doing better overall, meditating in am  strategies -- maybe beta blocker propranolol/atenolol?   cognitive behavioral therapy - seeing her as well, has improved symptoms now     Hot Flashes: improved overall, some returning last few weeks  Acupuncture - have an appt - next month, symptom management clinic but seeing someone else for a few weeks, helping  Yoga is helpful as is stress management  Consider Acteane (Boiron, homeopathic)  Relazen (Swedish Herb)     F/u in symptom management     SYMPTOM MANAGEMENT:   Symptoms Managed:  Hot Flashes - resumed but decreased since last visit, a few a day now, less intense but just as freq     Nausea - improved     Fatigue - gone     Anxiety/Insomnia - intermittent, starting radiation soon     Pain - joint stiffness in knees/hips, continued now but overall improved  hip pain R and R foot pain nael after walking (not during)  now overall improved otherwise  increased activity     Bloating - eating anything, trying smaller portions, much improved     Natural Products utilized:  CoQ10 100 mg/day  Vit D3 1000 IU/day  Fish Oil  Danielle Root  Mag  claritin  Lorazepam as needed  Protonix   Similase     Referrals: Yoga  Massage     Recommendations:  Continue CBT  Suggest  trying essential oils - lavender or peppermint in diffuser for hot flashes  Acteane or relazen  Consider meds like cymbalta for pain and hot flashes o/w     Follow Up:  Symptom Management: Weekly     SYMPTOM MANAGEMENT:  Integrative Oncology Symptom Management:     The integrative oncology symptom management clinic offers supervised care of cancer patients using Integrative Modalities billed to insurance using NCCN and SIO/ASCO guideline-driven practices.  ESAS is obtained prior to and after each treatment by practitioner     Symptoms Managed:  Hot Flashes - resumed but decreased since last visit, a few a day now, less intense but just as freq     Nausea - improved     Fatigue - gone     Anxiety/Insomnia - intermittent, starting radiation soon     Pain - joint stiffness in knees/hips, continued now but overall improved  hip pain R and R foot pain nael after walking (not during)  now overall improved otherwise  increased activity     Bloating - eating anything, trying smaller portions, much improved     Natural Products utilized:  CoQ10 100 mg/day  Vit D3 1000 IU/day  Fish Oil  Danielle Root  Mag  claritin  Lorazepam as needed  Protonix   Similase     Integrative Treatment: Acupuncture     Session #: 12  Frequency: Weekly     Referrals: Yoga  Massage     Recommendations:  Continue CBT  Suggest trying essential oils - lavender or peppermint in diffuser for hot flashes  Acteane or relazen  Consider meds like cymbalta for pain and hot flashes o/w     Follow Up:  Symptom Management: Weekly  Integrative Oncology: 3 mo     I have personally seen the patient and supervised the treatment by the integrative practitioner during this visit.  Pt had symptoms discussed and I was present for the patient's 45 minutes of direct patient care.  Joseph Fuchs MD

## 2024-01-23 ENCOUNTER — HOSPITAL ENCOUNTER (OUTPATIENT)
Dept: CARDIOLOGY | Facility: HOSPITAL | Age: 44
Discharge: HOME | End: 2024-01-23
Payer: COMMERCIAL

## 2024-01-23 ENCOUNTER — APPOINTMENT (OUTPATIENT)
Dept: CARDIOLOGY | Facility: HOSPITAL | Age: 44
End: 2024-01-23
Payer: COMMERCIAL

## 2024-01-23 VITALS
SYSTOLIC BLOOD PRESSURE: 114 MMHG | OXYGEN SATURATION: 100 % | BODY MASS INDEX: 24.27 KG/M2 | WEIGHT: 151.01 LBS | RESPIRATION RATE: 16 BRPM | HEART RATE: 79 BPM | DIASTOLIC BLOOD PRESSURE: 82 MMHG | HEIGHT: 66 IN

## 2024-01-23 DIAGNOSIS — C50.911 MALIGNANT NEOPLASM OF UNSPECIFIED SITE OF RIGHT FEMALE BREAST (MULTI): ICD-10-CM

## 2024-01-23 PROCEDURE — 36590 REMOVAL TUNNELED CV CATH: CPT | Performed by: RADIOLOGY

## 2024-01-23 PROCEDURE — 7100000009 HC PHASE TWO TIME - INITIAL BASE CHARGE: Performed by: RADIOLOGY

## 2024-01-23 PROCEDURE — 2500000004 HC RX 250 GENERAL PHARMACY W/ HCPCS (ALT 636 FOR OP/ED): Performed by: RADIOLOGY

## 2024-01-23 PROCEDURE — 99152 MOD SED SAME PHYS/QHP 5/>YRS: CPT | Performed by: RADIOLOGY

## 2024-01-23 PROCEDURE — 7100000010 HC PHASE TWO TIME - EACH INCREMENTAL 1 MINUTE: Performed by: RADIOLOGY

## 2024-01-23 PROCEDURE — 2500000005 HC RX 250 GENERAL PHARMACY W/O HCPCS: Performed by: RADIOLOGY

## 2024-01-23 RX ORDER — LIDOCAINE HYDROCHLORIDE AND EPINEPHRINE 10; 10 MG/ML; UG/ML
INJECTION, SOLUTION INFILTRATION; PERINEURAL AS NEEDED
Status: DISCONTINUED | OUTPATIENT
Start: 2024-01-23 | End: 2024-01-24 | Stop reason: HOSPADM

## 2024-01-23 RX ORDER — FENTANYL CITRATE 50 UG/ML
INJECTION, SOLUTION INTRAMUSCULAR; INTRAVENOUS AS NEEDED
Status: DISCONTINUED | OUTPATIENT
Start: 2024-01-23 | End: 2024-01-24 | Stop reason: HOSPADM

## 2024-01-23 RX ORDER — MIDAZOLAM HYDROCHLORIDE 1 MG/ML
INJECTION, SOLUTION INTRAMUSCULAR; INTRAVENOUS AS NEEDED
Status: DISCONTINUED | OUTPATIENT
Start: 2024-01-23 | End: 2024-01-24 | Stop reason: HOSPADM

## 2024-01-23 RX ADMIN — FENTANYL CITRATE 100 MCG: 50 INJECTION, SOLUTION INTRAMUSCULAR; INTRAVENOUS at 07:57

## 2024-01-23 RX ADMIN — MIDAZOLAM 1 MG: 1 INJECTION INTRAMUSCULAR; INTRAVENOUS at 07:57

## 2024-01-23 RX ADMIN — LIDOCAINE HYDROCHLORIDE,EPINEPHRINE BITARTRATE 10 ML: 10; .01 INJECTION, SOLUTION INFILTRATION; PERINEURAL at 08:00

## 2024-01-23 ASSESSMENT — PAIN SCALES - GENERAL
PAINLEVEL_OUTOF10: 0 - NO PAIN

## 2024-01-23 ASSESSMENT — PAIN - FUNCTIONAL ASSESSMENT
PAIN_FUNCTIONAL_ASSESSMENT: 0-10

## 2024-01-23 NOTE — POST-PROCEDURE NOTE
Interventional Radiology Brief Postprocedure Note    Attending: Dequan Sinha MD      Assistant: none    Diagnosis: breast ca    Description of procedure: port removal     Anesthesia:  Local, mod sed    Complications: None    Estimated Blood Loss: minimal    Medications (Filter: Administrations occurring from 0818 to 0818 on 01/23/24) As of 01/23/24 0818      None          No specimens collected      See detailed result report with images in PACS.    The patient tolerated the procedure well without incident or complication and is in stable condition.

## 2024-01-23 NOTE — DISCHARGE INSTRUCTIONS
Keep incision clean and dry.  Remove dressing in 48hrs.  Do not get incision site wet until edges appear healed, approximately 7 days.  Do not get into any standing water for 1 week.  Do not drive or operate any machinery for 24hrs..  Do not drink any alcohol for 24hrs.   Do not make any important decisions for 24hrs.

## 2024-01-23 NOTE — SIGNIFICANT EVENT
No change in site assessment. Dc instructions given to patient with verbal affirmation of understanding.

## 2024-01-24 ENCOUNTER — ALLIED HEALTH (OUTPATIENT)
Dept: INTEGRATIVE MEDICINE | Facility: CLINIC | Age: 44
End: 2024-01-24
Payer: COMMERCIAL

## 2024-01-24 DIAGNOSIS — R63.0 POOR APPETITE: ICD-10-CM

## 2024-01-24 DIAGNOSIS — M25.50 ARTHRALGIA, UNSPECIFIED JOINT: Primary | ICD-10-CM

## 2024-01-24 DIAGNOSIS — C50.511 MALIGNANT NEOPLASM OF LOWER-OUTER QUADRANT OF RIGHT BREAST OF FEMALE, ESTROGEN RECEPTOR NEGATIVE (MULTI): ICD-10-CM

## 2024-01-24 DIAGNOSIS — Z17.1 MALIGNANT NEOPLASM OF LOWER-OUTER QUADRANT OF RIGHT BREAST OF FEMALE, ESTROGEN RECEPTOR NEGATIVE (MULTI): ICD-10-CM

## 2024-01-24 DIAGNOSIS — R60.9 SWELLING: ICD-10-CM

## 2024-01-24 DIAGNOSIS — M25.559 ARTHRALGIA OF HIP, UNSPECIFIED LATERALITY: ICD-10-CM

## 2024-01-24 PROCEDURE — 99213 OFFICE O/P EST LOW 20 MIN: CPT | Performed by: HOSPITALIST

## 2024-01-24 ASSESSMENT — PAIN SCALES - GENERAL: PAINLEVEL_OUTOF10: 1

## 2024-01-24 NOTE — PROGRESS NOTES
Patient ID: Jose Honeycutt is a 44 y.o. female.  Referring Physician: No referring provider defined for this encounter.  Primary Care Provider: No Assigned PCP Generic Provider, MD    CANCER HISTORY:   44 yo woman with R breast IDC, triple negative  Genetics negative  Patient has 2 children, and notes completion of family building.      PMH: SVT/Afib, anxiety, triple negative breast CA   Psych History: Post partum depression/anxiety following birth of son,   generalized anxiety at baseline, is actively doing talk therapy   Surg Hx: catheter ablation 2020 for SVT      Treatment History:   Neoadjuvant carboplatin, paclitaxel, pembrolizumab; followed by doxorubicin,   cyclophosphamide, pembrolizumab (per KEYNOTE-522 protocol):      C1D1 2/16/23 (pembrolizumab was received, but developed a grade 3 infusion   reaction to paclitaxel).   - Switched to Abraxane/carboplatin for week #2. No issues.   - Had possible reaction versus anxiety attack during week #3. Treatment was   held.   Plan to have re-evaluation with surgery and adjuvant AC/keytruda.   Last immunotherapy end of this month     now s/p surgery  Radiation - causing lots of fatigue, pain  Now completed all therapy     History of Present Illness Consulted by: Dr. Knight  For: breast cancer     Started AC - c/b fatigue but o/w doing well - feeling good now overall  Bloating but o/w doing well, contd and trying to drink tea and eat smaller portions - improved with similase     Chief complaints and symptoms:  Managing chemo side effects without pharmaceuticals  Menstrual irreg/premature menopause - very limited right now - has some anxiety around periods     Anxiety and diff sleeping - improved significantly but cutting out caffeine, being active - doing well now  waking up in middle of night  improved anxiety and insomnia - ok now     Arthralgias - fingers, shoulders, hurts.  Started a few weeks ago, foot and hip for 3 months     More nausea now, drinking kathe tea      Hot flashes - improved, almost gone     Fatigue - doing well, has for a few days after chemo, now done, still tired        GERD symptoms with chemo, now with bloating  Occas chest pains, burning feeling     Interested in: acupuncture, Chinese herbs, guided imagery, nutrition, stress management     Diet: Eating very well overall, cut out fast food/junk food  More plant based - contd  Doing well overall, has made food changes we suggested     PA: walking 2-3 times/week, using treadmill, not steady right now     Sleep:Diff staying asleep, racing thoughts usually middle of the night - now sleeping better  Doing morning meditation, sleep hygiene  Journals prior to sleeping  Sleeping well now, cut out caffeine  no diff falling asleep but diff staying asleep     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   On all of the above she noted not experiencing any PMS or anxiety. She is   currently only taking gerry root/borage oil and magnesium.      CoQ10 100 mg/day  Vit D3 1000 IU/day  Fish Oil  Gerry Root  Mag  claritin  Lorazepam as needed  Protonix   Consulted by: Dr. Knight  For: breast cancer     Started AC - c/b fatigue but o/w doing well - feeling good now overall  Bloating but o/w doing well, contd and trying to drink tea and eat smaller portions - improved with similase    ROS:  no ha, visual symptoms, hearing loss  no sob, chest pain, palp  ROS o/w non contributory, please see HPI    Objective    BSA: There is no height or weight on file to calculate BSA.  There were no vitals taken for this visit.    PHYSICAL EXAM:  NAD, awake/alert  HEENT, NCAT, OP clear, no oral lesions  CTA  bilat  RRR no mgr  Abd soft/nt/nd+bs  No c/c/e/ttp  Motor/sensory intact, CN 2-12 intact     RESULTS:  Lab Results   Component Value Date    WBC 3.8 (L) 12/28/2023    HGB 12.0 12/28/2023    HCT 35.2 (L) 12/28/2023     12/28/2023    CREATININE 0.55 12/28/2023    AST 20 12/28/2023       Assessment/Plan   Cancer Staging   Malignant neoplasm of female breast (CMS/HCC)  Staging form: Breast, AJCC 8th Edition  - Clinical stage from 1/19/2023: Stage IIB (cT1c, cN1(f), cM0, G3, ER-, [place]-, HER2-) - Signed by Mango Hernandez MD on 10/11/2023  - Pathologic stage from 8/25/2023: No Stage Recommended (ypT0, pN0(sn), cM0, G3, ER-, [place]-, HER2-) - Signed by Mango Hernandez MD on 10/11/2023      CANCER SPECIFIC RECCS:  42 yo woman with triple negative breast cancer on neoadj carbo/abraxane/keytruda f/b surgery then AC/keytruda as plan.  Doing well on AC/pembro chemo      Breast cancer:  Whole Foods plant based diet  Limit sugar  Limit alcohol  Anticancer Lifestyle Program  Read: Anticancer Living     VIOME testing     Arthralgias - using turmeric in food  Consider motrin prn  IMO likely related to immunotherapy - does not need treatment now, ending immunotherapy end of the month, but if worsens then can discuss further with Dr. Knight.  Cont acupuncture  [place] [address]  Yoga  Massage     Exercise 30 min/day 5 days a week, vary by balance, cardio and resistance training  Continue walking now  Some fatigue - consider American ginseng 2 grams/day     Supplements to consider:  Melatonin 1-3 mg at night 1 hr prior to sleep - not taking  Vitamin D3 1550-6060 IU/day  Omega 3 supplementation (nordic naturals)     Sleep - try to get over 7 hours/night  Limit TV or electronics at night  Limit caffeine intake and only to the morning  Get exposure to light in the morning if possible  Meditation and/or breathing exercises in the morning are helpful  5 min breathing exercises: Alternate Nostril Breathing and Derek Corby breathing  Continue walking  middle of the day     Sleep hygiene - should try to sleep by 10 pm  Limit TV or exposure to light at night including cell phones  Limit caffeine to AM only if needed (e.g. coffee)  Morning exposure to light, getting outside or bright white light in the morning  Chamomille Tea  Consider melatonin   Consider massage  Consider similase (integrative therapeutics) for bloating     Irreg periods - likely from chemo, f/b endocrine - not having now     Anxiety - has tried SSRI in past without relief. Manifested with sleep interruption and cardiac symptoms.  - Patient connected with [facility] for peer support   - Patient currently seeing therapist for talk therapy   - Referral made to onco-psychiatry to explore other medication management   Doing better overall, meditating in am  strategies -- maybe beta blocker propranolol/atenolol?   cognitive behavioral therapy - seeing her as well, has improved symptoms now     Hot Flashes: improved overall, some returning last few weeks  Acupuncture - have an appt - next month, symptom management clinic but seeing someone else for a few weeks, helping  Yoga is helpful as is stress management  Consider Acteane (Boiron, homeopathic)  Relazen (Swedish Herb)     F/u in symptom management     SYMPTOM MANAGEMENT:   Symptoms Managed:  Hot Flashes - gone now     Nausea - improved     Fatigue - gone     Anxiety/Insomnia - intermittent, completed treatment     Pain - joint stiffness in knees/hips, continued now but overall improved  hip pain R and R foot pain nael after walking (not during).  Contd r foot pain  now overall improved otherwise  increased activity, shoulders tight     Bloating - eating anything, trying smaller portions, much improved     Natural Products utilized:  CoQ10 100 mg/day  Vit D3 1000 IU/day  Fish Oil  Danielle Root  Mag  claritin  Lorazepam as needed  Protonix   Similase     Referrals: Yoga  Massage     Recommendations:  Continue CBT  Suggest trying essential oils -  lavender or peppermint in diffuser for hot flashes  Acteane or relazen  Consider meds like cymbalta for pain and hot flashes o/w     Follow Up:  Symptom Management: Weekly     SYMPTOM MANAGEMENT:  Integrative Oncology Symptom Management:     The integrative oncology symptom management clinic offers supervised care of cancer patients using Integrative Modalities billed to insurance using NCCN and SIO/ASCO guideline-driven practices.  ESAS is obtained prior to and after each treatment by practitioner     Symptoms Managed:  Hot Flashes - resumed but decreased since last visit, a few a day now, less intense but just as freq     Nausea - improved     Fatigue - gone     Anxiety/Insomnia - intermittent, starting radiation soon     Pain - joint stiffness in knees/hips, continued now but overall improved  hip pain R and R foot pain nael after walking (not during)  now overall improved otherwise  increased activity     Bloating - eating anything, trying smaller portions, much improved     Natural Products utilized:  CoQ10 100 mg/day  Vit D3 1000 IU/day  Fish Oil  Danielle Root  Mag  claritin  Lorazepam as needed  Protonix   Similase     Integrative Treatment: Acupuncture     Session #: 13  Frequency: Weekly     Referrals: Yoga  Massage     Recommendations:  Continue CBT  Suggest trying essential oils - lavender or peppermint in diffuser for hot flashes  Acteane or relazen  Consider meds like cymbalta for pain and hot flashes o/w     Follow Up:  Symptom Management: Weekly  Integrative Oncology: 3 mo     I have personally seen the patient and supervised the treatment by the integrative practitioner during this visit.  Pt had symptoms discussed and I was present for the patient's 45 minutes of direct patient care.  Joseph Fuchs MD

## 2024-01-24 NOTE — PROGRESS NOTES
Acupuncture Visit:     Subjective   Patient ID: Jose Honeycutt is a 44 y.o. female who presents for No chief complaint on file.  HPI     Labs reviewed per Integrative oncology guidelines. ANC >1000, Platelets >20. No contraindications to treatment. Patient advised of usual risks of acupuncture including bleeding, bruising, and infection and verbalized understanding.     Has been feeling well  Had port out yesterday.   No hot flashes, slight foot pain  Right hip pain, tight shoulders.         Congestion PND cold since last week  Sore thoroat at beginning  Right side hip flexor more painful . Pain in foot is still there, not worse  Leg only painful when moving  Hot flashes are less intense, but still present   Energy OK.           Pre-treatment Assessment  Pain Score: 3  Anxiety Level (0-10): 0  Stress Level (0-10): 0  Coping Level (0-10): 8  Depression Level (0-10): 0  Fatigue Level (0-10): 0  Nausea Level (0-10): 0  Wellbeing Level (0-10): 2    Review of Systems         Provider reviewed plan for the acupuncture session, precautions and contraindications. Patient/guardian/hospital staff has given consent to treat with full understanding of what to expect during the session. Before acupuncture began, provider explained to the patient to communicate at any time if the procedure was causing discomfort past their tolerance level. Patient agreed to advise acupuncturist. The acupuncturist counseled the patient on the risks of acupuncture treatment including pain, infection, bleeding, and no relief of pain. The patient was positioned comfortably. There was no evidence of infection at the site of needle insertions.    Objective   Physical Exam     Points: ear Rubio men, KI, LR, LI11, LI4, HT7, SP6, KI7, KI3, KI6, LR3  add GB40 and bobby little toe MTP right   Heat lamp: none           Needles in/out: 22 +2                   Post-treatment Assessment  Pain Score: 1  Anxiety Level (0-10): 0  Stress Level (0-10): 0  Coping Level  (0-10): 9  Depression Level (0-10): 0  Fatigue Level (0-10): 0  Nausea Level (0-10): 0  Wellbeing Level (0-10): 1    Assessment/Plan

## 2024-01-31 ENCOUNTER — APPOINTMENT (OUTPATIENT)
Dept: INTEGRATIVE MEDICINE | Facility: CLINIC | Age: 44
End: 2024-01-31
Payer: COMMERCIAL

## 2024-02-22 ENCOUNTER — OFFICE VISIT (OUTPATIENT)
Dept: HEMATOLOGY/ONCOLOGY | Facility: HOSPITAL | Age: 44
End: 2024-02-22
Payer: COMMERCIAL

## 2024-02-22 VITALS
BODY MASS INDEX: 24.2 KG/M2 | SYSTOLIC BLOOD PRESSURE: 121 MMHG | WEIGHT: 150.57 LBS | DIASTOLIC BLOOD PRESSURE: 78 MMHG | HEIGHT: 66 IN | TEMPERATURE: 98.1 F | HEART RATE: 82 BPM | OXYGEN SATURATION: 95 % | RESPIRATION RATE: 16 BRPM

## 2024-02-22 DIAGNOSIS — M25.50 ARTHRALGIA, UNSPECIFIED JOINT: ICD-10-CM

## 2024-02-22 DIAGNOSIS — Z29.89 ENCOUNTER FOR IMMUNOTHERAPY: Primary | ICD-10-CM

## 2024-02-22 DIAGNOSIS — C50.919 MALIGNANT NEOPLASM OF FEMALE BREAST, UNSPECIFIED ESTROGEN RECEPTOR STATUS, UNSPECIFIED LATERALITY, UNSPECIFIED SITE OF BREAST (MULTI): ICD-10-CM

## 2024-02-22 PROCEDURE — 99214 OFFICE O/P EST MOD 30 MIN: CPT | Performed by: INTERNAL MEDICINE

## 2024-02-22 PROCEDURE — 1036F TOBACCO NON-USER: CPT | Performed by: INTERNAL MEDICINE

## 2024-02-22 ASSESSMENT — PAIN SCALES - GENERAL: PAINLEVEL: 0-NO PAIN

## 2024-02-23 ENCOUNTER — OFFICE VISIT (OUTPATIENT)
Dept: PRIMARY CARE | Facility: CLINIC | Age: 44
End: 2024-02-23
Payer: COMMERCIAL

## 2024-02-23 VITALS
HEIGHT: 66 IN | HEART RATE: 72 BPM | SYSTOLIC BLOOD PRESSURE: 119 MMHG | WEIGHT: 151 LBS | BODY MASS INDEX: 24.27 KG/M2 | DIASTOLIC BLOOD PRESSURE: 87 MMHG

## 2024-02-23 DIAGNOSIS — Z00.00 HEALTHCARE MAINTENANCE: Primary | ICD-10-CM

## 2024-02-23 DIAGNOSIS — C50.919 MALIGNANT NEOPLASM OF FEMALE BREAST, UNSPECIFIED ESTROGEN RECEPTOR STATUS, UNSPECIFIED LATERALITY, UNSPECIFIED SITE OF BREAST (MULTI): ICD-10-CM

## 2024-02-23 DIAGNOSIS — Z23 ENCOUNTER FOR IMMUNIZATION: ICD-10-CM

## 2024-02-23 DIAGNOSIS — M25.50 ARTHRALGIA, UNSPECIFIED JOINT: ICD-10-CM

## 2024-02-23 DIAGNOSIS — F41.9 ANXIETY: ICD-10-CM

## 2024-02-23 PROCEDURE — 99396 PREV VISIT EST AGE 40-64: CPT

## 2024-02-23 PROCEDURE — 90715 TDAP VACCINE 7 YRS/> IM: CPT

## 2024-02-23 PROCEDURE — 1036F TOBACCO NON-USER: CPT

## 2024-02-23 PROCEDURE — 90471 IMMUNIZATION ADMIN: CPT

## 2024-02-23 ASSESSMENT — ENCOUNTER SYMPTOMS
DIZZINESS: 0
APPETITE CHANGE: 0
ANAL BLEEDING: 0
PALPITATIONS: 0
JOINT SWELLING: 0
MYALGIAS: 0
ENDOCRINE NEGATIVE: 1
EYES NEGATIVE: 1
ACTIVITY CHANGE: 0
FEVER: 0
CARDIOVASCULAR NEGATIVE: 1
WEAKNESS: 0
FLANK PAIN: 0
FATIGUE: 0
NECK STIFFNESS: 0
COLOR CHANGE: 0
WHEEZING: 0
UNEXPECTED WEIGHT CHANGE: 0
RHINORRHEA: 0
NUMBNESS: 0
BACK PAIN: 0
RESPIRATORY NEGATIVE: 1
SINUS PRESSURE: 0
RECTAL PAIN: 0
HEMATURIA: 0
NECK PAIN: 0
DIARRHEA: 0
CHILLS: 0
FREQUENCY: 0
AGITATION: 0
POLYPHAGIA: 0
VOICE CHANGE: 0
COUGH: 0
TREMORS: 0
CONFUSION: 0
BLOOD IN STOOL: 0
SPEECH DIFFICULTY: 0
HEADACHES: 0
CONSTITUTIONAL NEGATIVE: 1
SLEEP DISTURBANCE: 0
DIAPHORESIS: 0
LIGHT-HEADEDNESS: 0
ARTHRALGIAS: 1
ABDOMINAL PAIN: 0
GASTROINTESTINAL NEGATIVE: 1
NEUROLOGICAL NEGATIVE: 1
PHOTOPHOBIA: 0
STRIDOR: 0
POLYDIPSIA: 0
HEMATOLOGIC/LYMPHATIC NEGATIVE: 1
HYPERACTIVE: 0
NAUSEA: 0
EYE DISCHARGE: 0
DYSURIA: 0
SEIZURES: 0
CHEST TIGHTNESS: 0
APNEA: 0
SORE THROAT: 0
SHORTNESS OF BREATH: 0
CONSTIPATION: 0
BRUISES/BLEEDS EASILY: 0
ABDOMINAL DISTENTION: 0
DYSPHORIC MOOD: 0
TROUBLE SWALLOWING: 0
DIFFICULTY URINATING: 0

## 2024-02-23 NOTE — PROGRESS NOTES
Primary Care Provider: LEAH Jorgensen-CNP    Subjective   Jose Honeycutt is a 44 y.o. female who presents for Establish Care.    43 yo woman with R breast IDC, triple negative (Genetics negative &Patient has 2 children, and notes completion of family building.)     PMH: SVT/Afib, anxiety, triple negative breast CA   Psych History: Post partum depression/anxiety following birth of son,   generalized anxiety at baseline, is actively doing talk therapy   Surg Hx: catheter ablation 2020 for SVT     Has cut out caffeine; helps with BP and anxiety  Does acupuncture with Dr. Gallardo    Arthralgias - fingers, shoulders, hurts.  Started a few weeks ago, foot and hip ; has been occurring on and off for the last few months    HM:  Diet: Eating very well overall, cut out fast food/junk food  PA: walking 2-3 times/week, using treadmill, not steady right now  Mammogram next month-following with oncology team for this; Finished cancer treatment this past December  Has IUD  Colonoscopy due at age 45 unless otherwise indicated    Works for [employer] in [place]    No chest pain, no shortness of breath, no dizziness, bowel movements regular, no trouble urinating, energy levels okay         Review of Systems   Constitutional: Negative.  Negative for activity change, appetite change, chills, diaphoresis, fatigue, fever and unexpected weight change.   HENT: Negative.  Negative for congestion, dental problem, ear discharge, ear pain, hearing loss, mouth sores, nosebleeds, postnasal drip, rhinorrhea, sinus pressure, sneezing, sore throat, tinnitus, trouble swallowing and voice change.    Eyes: Negative.  Negative for photophobia, discharge and visual disturbance.   Respiratory: Negative.  Negative for apnea, cough, chest tightness, shortness of breath, wheezing and stridor.    Cardiovascular: Negative.  Negative for chest pain, palpitations and leg swelling.   Gastrointestinal: Negative.  Negative for abdominal  "distention, abdominal pain, anal bleeding, blood in stool, constipation, diarrhea, nausea and rectal pain.   Endocrine: Negative.  Negative for cold intolerance, heat intolerance, polydipsia, polyphagia and polyuria.   Genitourinary: Negative.  Negative for decreased urine volume, difficulty urinating, dysuria, flank pain, frequency, hematuria and urgency.   Musculoskeletal:  Positive for arthralgias. Negative for back pain, gait problem, joint swelling, myalgias, neck pain and neck stiffness.   Skin: Negative.  Negative for color change and rash.   Neurological: Negative.  Negative for dizziness, tremors, seizures, syncope, speech difficulty, weakness, light-headedness, numbness and headaches.   Hematological: Negative.  Does not bruise/bleed easily.   Psychiatric/Behavioral:  Negative for agitation, confusion, dysphoric mood, sleep disturbance and suicidal ideas. The patient is not hyperactive.    All other systems reviewed and are negative.        Objective   /87   Pulse 72   Ht 1.676 m (5' 5.98\")   Wt 68.5 kg (151 lb)   BMI 24.39 kg/m²     Physical Exam  Vitals reviewed.   Constitutional:       General: She is not in acute distress.     Appearance: Normal appearance. She is normal weight. She is not ill-appearing, toxic-appearing or diaphoretic.   HENT:      Head: Normocephalic and atraumatic.      Nose: Nose normal.   Eyes:      Conjunctiva/sclera: Conjunctivae normal.   Neck:      Vascular: No carotid bruit.   Cardiovascular:      Rate and Rhythm: Normal rate and regular rhythm.      Pulses: Normal pulses.      Heart sounds: Normal heart sounds. No murmur heard.     No friction rub. No gallop.   Pulmonary:      Effort: Pulmonary effort is normal. No respiratory distress.      Breath sounds: Normal breath sounds.   Abdominal:      General: Abdomen is flat. Bowel sounds are normal.      Palpations: Abdomen is soft.   Musculoskeletal:         General: Normal range of motion.      Cervical back: Normal " range of motion and neck supple.   Lymphadenopathy:      Cervical: No cervical adenopathy.   Skin:     General: Skin is warm and dry.      Capillary Refill: Capillary refill takes less than 2 seconds.   Neurological:      General: No focal deficit present.      Mental Status: She is alert and oriented to person, place, and time. Mental status is at baseline.   Psychiatric:         Mood and Affect: Mood normal.         Behavior: Behavior normal.         Thought Content: Thought content normal.         Judgment: Judgment normal.         Assessment/Plan   Problem List Items Addressed This Visit    New patient visit/CPE     Malignant neoplasm of female breast (CMS/HCC)    Stable, continue following with oncology team  Has mammogram next month  Relevant Orders    Lipid Panel    Rheumatoid factor    JULES with Reflex to ASHVIN    Arthralgia    Persisting  Continue with acupuncture  Relevant Orders    Rheumatoid factor    JULES with Reflex to ASHVIN    Healthcare maintenance - Primary    Relevant Orders    Lipid Panel    Rheumatoid factor    JULES with Reflex to ASHVIN  Tdap given today in office  Continue with well-balanced diet and regular physical activity  Colonoscopy due at age 45 unless otherwise indicated  Mammogram due next month with the oncology team    Anxiety    Stable, continue following with therapy  Relevant Orders    Lipid Panel    Rheumatoid factor    JULES with Reflex to ASHVIN       Follow-up in 6 months or sooner if needed and annually with annual wellness exam

## 2024-02-25 NOTE — PROGRESS NOTES
Patient ID: Jose Honeycutt is a 44 y.o. female.  MRN: [id number]  : 1980  The patient presents to clinic today for her history of breast cancer.     Cancer Staging   Malignant neoplasm of female breast (CMS/HCC)  Staging form: Breast, AJCC 8th Edition  - Clinical stage from 2023: Stage IIB (cT1c, cN1(f), cM0, G3, ER-, NV-, HER2-) - Signed by Mango Hernandez MD on 10/11/2023  - Pathologic stage from 2023: No Stage Recommended (ypT0, pN0(sn), cM0, G3, ER-, NV-, HER2-) - Signed by Mango Hernandez MD on 10/11/2023    Diagnostic/Therapeutic History:  -1/10/23: Bilateral screening mammogram showed a 1.2 cm density in right breast, as well as a 7mm intramammary LN.  -23: Dx MG/US confirmed a 1.1 x 0.9 x 1.1 cm hypoechoic mass at 8:00, 6 cm FN. At 9:00, 6 cm FN, a 0.5 cm mass noted, possible an intramammary LN. The right axillary LN’s appeared normal.  -22: US-guided CNB of 8:00 mass showed IDC, grade 3, ER/NV-negative Her2-negative (1+ IHC). 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  -Genetic testing performed, negative for pathogenic mutations.  -Neoadjuvant chemoimmunotherapy (KEYNOTE-522 regimen) initiated 23: Pembrolizumab with carboplatin/paclitaxel, followed by doxorubicin/cyclophosphamide. Completed 23.  -23: Right lumpectomy/SLNBx performed. No residual carcinoma noted. 0/4 LN's (one sentinel node) involved. ypT0 ypN0.  - Adjuvant Pembrolizumab -10/19/23 - 2023 (completed 4 cycles adjuvant setting, discontinued for patient preference and development of arthritis).  - Radiation therapy, completed 2023.    History of Present Illness (HPI)/Interval History:  Jose presents for routine FUV.  States overall feel well. Started to notice tightness along the lateral side of her right breast and right axillary area. States stretching helps with her tightness and plans to continue with the exercise. Did have flu like symptoms couple weeks  ago with negative viral panel at urgent care. Couple days ago started to have bilaterally hand swelling and pain along with right foot mid joint pain. States these types of pain wax and wanes lasting about a week. Report acupuncture provided refill for her arthritis pain in past and planning to schedule another appointment in near future. States her right hip pain for last 5 months is completely resolved now. Denies any headaches, night sweets, un-intentional weight loss, poor appetite, bone pain, chest pain, or dyspnea.     Review of Systems:  14-point ROS otherwise negative, as per HPI/Interval History.    Past Medical History:   Diagnosis Date    Anxiety     Bacterial vaginitis 03/21/2023    Breast cancer (CMS/[facility]ly [facility])     Herpes simplex vulvovaginitis 03/21/2023    Vaginal discharge 03/21/2023    Viral illness 03/21/2023     Patient Active Problem List   Diagnosis    Malignant neoplasm of female breast (CMS/Formerly [facility])    Right hip pain    Arthralgia    Healthcare maintenance    Anxiety        Past Surgical History:   Procedure Laterality Date    BREAST LUMPECTOMY Right        Social History     Socioeconomic History    Marital status:      Spouse name: Not on file    Number of children: Not on file    Years of education: Not on file    Highest education level: Not on file   Occupational History    Not on file   Tobacco Use    Smoking status: Never     Passive exposure: Never    Smokeless tobacco: Never   Vaping Use    Vaping Use: Never used   Substance and Sexual Activity    Alcohol use: Never    Drug use: Never    Sexual activity: Not on file   Other Topics Concern    Not on file   Social History Narrative    Not on file     Social Determinants of Health     Financial Resource Strain: Not on file   Food Insecurity: No Food Insecurity (11/15/2023)    Hunger Vital Sign     Worried About Running Out of Food in the Last Year: Never true     Ran Out of Food in the Last Year: Never true   Transportation Needs: Not on  "file   Physical Activity: Not on file   Stress: Not on file   Social Connections: Not on file   Intimate Partner Violence: Not on file   Housing Stability: Not on file       Family History   Problem Relation Name Age of Onset    Diabetes Father      Prostate cancer Father      Diabetes Other Grnadmother        Allergies:   Allergies   Allergen Reactions    Sulfamethoxazole Hives         Current Outpatient Medications:     EPINEPHrine 0.3 mg/0.3 mL injection syringe, use as directed, Disp: , Rfl:     loratadine (Claritin) 10 mg tablet, Take 1 tablet (10 mg) by mouth once daily., Disp: , Rfl:      Objective    BSA: 1.78 meters squared  /78   Pulse 82   Temp 36.7 °C (98.1 °F) (Temporal)   Resp 16   Ht 1.676 m (5' 5.98\")   Wt 68.3 kg (150 lb 9.2 oz)   SpO2 95%   BMI 24.32 kg/m²     ECOG Score: 0- Fully active, able to carry on all pre-disease performance w/o restriction.    Physical Exam  Constitutional:       General: She is not in acute distress.     Appearance: Normal appearance. She is not ill-appearing.   HENT:      Head: Normocephalic and atraumatic.      Mouth/Throat:      Mouth: Mucous membranes are moist.      Pharynx: Oropharynx is clear. No oropharyngeal exudate.   Eyes:      General: No scleral icterus.     Conjunctiva/sclera: Conjunctivae normal.   Cardiovascular:      Rate and Rhythm: Normal rate and regular rhythm.      Heart sounds: No murmur heard.  Pulmonary:      Effort: Pulmonary effort is normal. No respiratory distress.      Breath sounds: Normal breath sounds.   Musculoskeletal:         General: No swelling, tenderness or deformity. Normal range of motion.      Cervical back: Normal range of motion and neck supple. No rigidity.   Lymphadenopathy:      Cervical: No cervical adenopathy.   Skin:     General: Skin is warm and dry.      Coloration: Skin is not jaundiced.      Findings: No rash.   Neurological:      General: No focal deficit present.      Mental Status: She is alert and " oriented to person, place, and time.      Cranial Nerves: No cranial nerve deficit.      Gait: Gait normal.   Psychiatric:         Mood and Affect: Mood normal.         Behavior: Behavior normal.         Thought Content: Thought content normal.         Judgment: Judgment normal.         Laboratory Data:  Lab Results   Component Value Date    WBC 3.8 (L) 12/28/2023    HGB 12.0 12/28/2023    HCT 35.2 (L) 12/28/2023    MCV 88 12/28/2023     12/28/2023       Chemistry    Lab Results   Component Value Date/Time     12/28/2023 1010    K 4.2 12/28/2023 1010     12/28/2023 1010    CO2 28 12/28/2023 1010    BUN 10 12/28/2023 1010    CREATININE 0.55 12/28/2023 1010    Lab Results   Component Value Date/Time    CALCIUM 9.0 12/28/2023 1010    ALKPHOS 105 12/28/2023 1010    AST 20 12/28/2023 1010    ALT 19 12/28/2023 1010    BILITOT 0.4 12/28/2023 1010                Assessment/Plan:  Jose Honeycutt is a 44 y.o. female with a history of right triple-negative breast cancer, who presents today for follow-up evaluation, currently on observation.     Malignant neoplasm of female breast (TNBC).  - No new concerning symptoms or examination findings today.  - Obtain updated labs (CBC, CMP, ESR, CRP, TSH).  - Mammogram scheduled with surgery 3/2024.  - Radiation FUV 7/2024.    Grade 1 irAE - inflammatory arthritis- resolved.  No further pembrolizumab given.    Disposition.  - RTC 10/2024 ([place] location).  - She has our contact information and was instructed to call with concerns/questions in the interim.    Orders Placed This Encounter   Procedures    CBC and Auto Differential    Comprehensive Metabolic Panel    TSH with reflex to Free T4 if abnormal    Vitamin D 25-Hydroxy,Total (for eval of Vitamin D levels)    C-Reactive Protein    Sedimentation Rate     Te Knight MD  Hematology and Oncology

## 2024-03-06 ENCOUNTER — ALLIED HEALTH (OUTPATIENT)
Dept: INTEGRATIVE MEDICINE | Facility: CLINIC | Age: 44
End: 2024-03-06
Payer: COMMERCIAL

## 2024-03-06 DIAGNOSIS — M25.559 ARTHRALGIA OF HIP, UNSPECIFIED LATERALITY: ICD-10-CM

## 2024-03-06 DIAGNOSIS — R60.9 SWELLING: ICD-10-CM

## 2024-03-06 DIAGNOSIS — M25.50 ARTHRALGIA, UNSPECIFIED JOINT: ICD-10-CM

## 2024-03-06 DIAGNOSIS — C50.511 MALIGNANT NEOPLASM OF LOWER-OUTER QUADRANT OF RIGHT BREAST OF FEMALE, ESTROGEN RECEPTOR NEGATIVE (MULTI): ICD-10-CM

## 2024-03-06 DIAGNOSIS — R63.0 POOR APPETITE: Primary | ICD-10-CM

## 2024-03-06 DIAGNOSIS — Z17.1 MALIGNANT NEOPLASM OF LOWER-OUTER QUADRANT OF RIGHT BREAST OF FEMALE, ESTROGEN RECEPTOR NEGATIVE (MULTI): ICD-10-CM

## 2024-03-06 PROCEDURE — 99213 OFFICE O/P EST LOW 20 MIN: CPT | Performed by: HOSPITALIST

## 2024-03-06 ASSESSMENT — PAIN SCALES - GENERAL: PAINLEVEL_OUTOF10: 2

## 2024-03-06 NOTE — PROGRESS NOTES
Patient ID: Jose Honeycutt is a 44 y.o. female.  Referring Physician: No referring provider defined for this encounter.  Primary Care Provider: JACKELYN Jorgensen    CANCER HISTORY:   44 yo woman with R breast IDC, triple negative  Genetics negative  Patient has 2 children, and notes completion of family building.      PMH: SVT/Afib, anxiety, triple negative breast CA   Psych History: Post partum depression/anxiety following birth of son,   generalized anxiety at baseline, is actively doing talk therapy   Surg Hx: catheter ablation 2020 for SVT      Treatment History:   Neoadjuvant carboplatin, paclitaxel, pembrolizumab; followed by doxorubicin,   cyclophosphamide, pembrolizumab (per KEYNOTE-522 protocol):      C1D1 2/16/23 (pembrolizumab was received, but developed a grade 3 infusion   reaction to paclitaxel).   - Switched to Abraxane/carboplatin for week #2. No issues.   - Had possible reaction versus anxiety attack during week #3. Treatment was   held.   Plan to have re-evaluation with surgery and adjuvant AC/keytruda.   Last immunotherapy end of this month     now s/p surgery  Radiation - causing lots of fatigue, pain  Now completed all therapy     History of Present Illness Consulted by: Dr. Knight  For: breast cancer     Started AC - c/b fatigue but o/w doing well - feeling good now overall  Bloating but o/w doing well, contd and trying to drink tea and eat smaller portions - improved with similase     Chief complaints and symptoms:  Managing chemo side effects without pharmaceuticals  Menstrual irreg/premature menopause - very limited right now - has some anxiety around periods     Anxiety and diff sleeping - improved significantly but cutting out caffeine, being active - doing well now  waking up in middle of night  improved anxiety and insomnia - ok now     Arthralgias - fingers, shoulders, hurts.  Started a few weeks ago, foot and hip for 3 months     More nausea now, drinking kathe tea     Hot  flashes - improved, almost gone     Fatigue - doing well, has for a few days after chemo, now done, still tired        GERD symptoms with chemo, now with bloating  Occas chest pains, burning feeling     Interested in: acupuncture, Chinese herbs, guided imagery, nutrition, stress management     Diet: Eating very well overall, cut out fast food/junk food  More plant based - contd  Doing well overall, has made food changes we suggested     PA: walking 2-3 times/week, using treadmill, not steady right now     Sleep:Diff staying asleep, racing thoughts usually middle of the night - now sleeping better  Doing morning meditation, sleep hygiene  Journals prior to sleeping  Sleeping well now, cut out caffeine  no diff falling asleep but diff staying asleep     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   On all of the above she noted not experiencing any PMS or anxiety. She is   currently only taking gerry root/borage oil and magnesium.      CoQ10 100 mg/day  Vit D3 1000 IU/day  Fish Oil  Gerry Root  Mag  claritin  Lorazepam as needed  Protonix   Consulted by: Dr. Knight  For: breast cancer     Started AC - c/b fatigue but o/w doing well - feeling good now overall  Bloating but o/w doing well, contd and trying to drink tea and eat smaller portions - improved with similase  ROS:  no ha, visual symptoms, hearing loss  no sob, chest pain, palp  ROS o/w non contributory, please see HPI    Objective    BSA: There is no height or weight on file to calculate BSA.  There were no vitals taken for this visit.    PHYSICAL EXAM:  NAD, awake/alert  HEENT, NCAT, OP clear, no oral lesions  CTA  bilat  RRR no mgr  Abd soft/nt/nd+bs  No c/c/e/ttp  Motor/sensory intact, CN 2-12 intact     RESULTS:  Lab Results   Component Value Date    WBC 3.8 (L) 12/28/2023    HGB 12.0 12/28/2023    HCT 35.2 (L) 12/28/2023     12/28/2023    CREATININE 0.55 12/28/2023    AST 20 12/28/2023       Assessment/Plan   Cancer Staging   Malignant neoplasm of female breast (CMS/HCC)  Staging form: Breast, AJCC 8th Edition  - Clinical stage from 1/19/2023: Stage IIB (cT1c, cN1(f), cM0, G3, ER-, WI-, HER2-) - Signed by Mango Hernandez MD on 10/11/2023  - Pathologic stage from 8/25/2023: No Stage Recommended (ypT0, pN0(sn), cM0, G3, ER-, WI-, HER2-) - Signed by Mango Hernandez MD on 10/11/2023      CANCER SPECIFIC RECCS:  44 yo woman with triple negative breast cancer on neoadj carbo/abraxane/keytruda f/b surgery then AC/keytruda as plan.  Doing well on AC/pembro chemo      Breast cancer:  Whole Foods plant based diet  Limit sugar  Limit alcohol  Anticancer Lifestyle Program  Read: Anticancer Living     VIOME testing     Arthralgias - using turmeric in food  Consider motrin prn  IMO likely related to immunotherapy - does not need treatment now, ending immunotherapy end of the month, but if worsens then can discuss further with Dr. Knight.  Cont acupuncture  [place] 3  Yoga  Massage     Exercise 30 min/day 5 days a week, vary by balance, cardio and resistance training  Continue walking now  Some fatigue - consider American ginseng 2 grams/day     Supplements to consider:  Melatonin 1-3 mg at night 1 hr prior to sleep - not taking  Vitamin D3 5744-9666 IU/day  Omega 3 supplementation (nordic naturals)     Sleep - try to get over 7 hours/night  Limit TV or electronics at night  Limit caffeine intake and only to the morning  Get exposure to light in the morning if possible  Meditation and/or breathing exercises in the morning are helpful  5 min breathing exercises: Alternate Nostril Breathing and Derek Corby breathing  Continue walking  middle of the day     Sleep hygiene - should try to sleep by 10 pm  Limit TV or exposure to light at night including cell phones  Limit caffeine to AM only if needed (e.g. coffee)  Morning exposure to light, getting outside or bright white light in the morning  [place] Tea  Consider melatonin   Consider massage  Consider similase (integrative therapeutics) for bloating     Irreg periods - likely from chemo, f/b endocrine - not having now     Anxiety - has tried SSRI in past without relief. Manifested with sleep interruption and cardiac symptoms.  - Patient connected with [facility] for peer support   - Patient currently seeing therapist for talk therapy   - Referral made to onco-psychiatry to explore other medication management   Doing better overall, meditating in am  strategies -- maybe beta blocker propranolol/atenolol?   cognitive behavioral therapy - seeing her as well, has improved symptoms now     Hot Flashes: improved overall, some returning last few weeks  Acupuncture - have an appt - next month, symptom management clinic but seeing someone else for a few weeks, helping  Yoga is helpful as is stress management  Consider Acteane (Boiron, homeopathic)  Relazen (Swedish Herb)     F/u in symptom management        SYMPTOM MANAGEMENT:  Integrative Oncology Symptom Management:     The integrative oncology symptom management clinic offers supervised care of cancer patients using Integrative Modalities billed to insurance using NCCN and SIO/ASCO guideline-driven practices.  ESAS is obtained prior to and after each treatment by practitioner     Symptoms Managed:  Hot Flashes - resumed but decreased since last visit, a few a day now, mostly during day worsening  Acupuncture usually helps     Nausea - improved     Fatigue - gone     Anxiety/Insomnia - intermittent, starting radiation soon, still not sleeping well     Pain - joint stiffness in knees/hips, continued now but overall improved  hip pain R and R  foot pain nael after walking (not during)  now overall improved otherwise  increased activity  Foot and hip pain improved, but intermittently feels joint swellings/pain and then improves     Bloating - eating anything, trying smaller portions, much improved     Natural Products utilized:  CoQ10 100 mg/day  Vit D3 1000 IU/day  Fish Oil  Danielle Root  Mag  claritin  Lorazepam as needed  Protonix   Similase     Integrative Treatment: Acupuncture     Session #: 14  Frequency: Weekly     Referrals: Yoga  Massage     Recommendations:  Continue CBT  Suggest trying essential oils - lavender or peppermint in diffuser for hot flashes  Acteane or relazen  Consider meds like cymbalta for pain and hot flashes o/w  Consider icing joints when inflamed     Follow Up:  Symptom Management: Weekly  Integrative Oncology: 3 mo     I have personally seen the patient and supervised the treatment by the integrative practitioner during this visit.  Pt had symptoms discussed and I was present for the patient's 45 minutes of direct patient care.  Joseph Fuchs MD

## 2024-03-06 NOTE — PROGRESS NOTES
Acupuncture Visit:     Subjective   Patient ID: Jose Honeycutt is a 44 y.o. female who presents for No chief complaint on file.  HPI     Labs reviewed per Integrative oncology guidelines. ANC >1000, Platelets >20. No contraindications to treatment. Patient advised of usual risks of acupuncture including bleeding, bruising, and infection and verbalized understanding.       Hot flashes increased, worse at night  A few times a day  Sleep, waking often  More tightness in axilla, has been dong stretching,   Foot pain decreased, as well as hip pain.   Will get occ joint flares            Pre-treatment Assessment  Pain Score: 3  Anxiety Level (0-10): 0  Stress Level (0-10): 0  Coping Level (0-10): 10  Depression Level (0-10): 0  Fatigue Level (0-10): 0  Nausea Level (0-10): 0  Wellbeing Level (0-10): 2    Review of Systems         Provider reviewed plan for the acupuncture session, precautions and contraindications. Patient/guardian/hospital staff has given consent to treat with full understanding of what to expect during the session. Before acupuncture began, provider explained to the patient to communicate at any time if the procedure was causing discomfort past their tolerance level. Patient agreed to advise acupuncturist. The acupuncturist counseled the patient on the risks of acupuncture treatment including pain, infection, bleeding, and no relief of pain. The patient was positioned comfortably. There was no evidence of infection at the site of needle insertions.    Objective   Physical Exam     Points: ear Rubio men, KI, LR, LI11, LI4, HT7, SP6, KI7, KI3, KI6, LR3  add GB40 and bobby little toe MTP right   Heat lamp: none           Needles in/out: 22 +2                   Post-treatment Assessment  Pain Score: 2  Anxiety Level (0-10): 0  Stress Level (0-10): 0  Coping Level (0-10): 10  Depression Level (0-10): 0  Fatigue Level (0-10): 0  Nausea Level (0-10): 0  Wellbeing Level (0-10): 1    Assessment/Plan   Diagnoses and  all orders for this visit:  Poor appetite (Primary)  Arthralgia of hip, unspecified laterality  Malignant neoplasm of lower-outer quadrant of right breast of female, estrogen receptor negative (CMS/HCC)  Swelling  Arthralgia, unspecified joint

## 2024-03-12 ENCOUNTER — OFFICE VISIT (OUTPATIENT)
Dept: SURGICAL ONCOLOGY | Facility: HOSPITAL | Age: 44
End: 2024-03-12
Payer: COMMERCIAL

## 2024-03-12 ENCOUNTER — HOSPITAL ENCOUNTER (OUTPATIENT)
Dept: RADIOLOGY | Facility: HOSPITAL | Age: 44
Discharge: HOME | End: 2024-03-12
Payer: COMMERCIAL

## 2024-03-12 VITALS
SYSTOLIC BLOOD PRESSURE: 131 MMHG | TEMPERATURE: 97.2 F | BODY MASS INDEX: 24.34 KG/M2 | DIASTOLIC BLOOD PRESSURE: 87 MMHG | WEIGHT: 150.8 LBS | HEART RATE: 76 BPM

## 2024-03-12 VITALS — BODY MASS INDEX: 24.11 KG/M2 | HEIGHT: 66 IN | WEIGHT: 150 LBS

## 2024-03-12 DIAGNOSIS — Z90.11 STATUS POST PARTIAL MASTECTOMY OF RIGHT BREAST: ICD-10-CM

## 2024-03-12 DIAGNOSIS — M25.50 ARTHRALGIA, UNSPECIFIED JOINT: ICD-10-CM

## 2024-03-12 DIAGNOSIS — C50.511 MALIGNANT NEOPLASM OF LOWER-OUTER QUADRANT OF RIGHT FEMALE BREAST (MULTI): ICD-10-CM

## 2024-03-12 DIAGNOSIS — Z85.3 ENCOUNTER FOR FOLLOW-UP SURVEILLANCE OF BREAST CANCER: ICD-10-CM

## 2024-03-12 DIAGNOSIS — Z80.3 FAMILY HISTORY OF BREAST CANCER: ICD-10-CM

## 2024-03-12 DIAGNOSIS — Z17.1 ESTROGEN RECEPTOR NEGATIVE STATUS (ER-): ICD-10-CM

## 2024-03-12 DIAGNOSIS — Z92.3 S/P RADIATION THERAPY: ICD-10-CM

## 2024-03-12 DIAGNOSIS — C50.919 MALIGNANT NEOPLASM OF FEMALE BREAST, UNSPECIFIED ESTROGEN RECEPTOR STATUS, UNSPECIFIED LATERALITY, UNSPECIFIED SITE OF BREAST (MULTI): Primary | ICD-10-CM

## 2024-03-12 DIAGNOSIS — Z92.21 S/P CHEMOTHERAPY, TIME SINCE LESS THAN 4 WEEKS: ICD-10-CM

## 2024-03-12 DIAGNOSIS — Z08 ENCOUNTER FOR FOLLOW-UP SURVEILLANCE OF BREAST CANCER: ICD-10-CM

## 2024-03-12 PROCEDURE — 99213 OFFICE O/P EST LOW 20 MIN: CPT | Performed by: SURGERY

## 2024-03-12 PROCEDURE — 77062 BREAST TOMOSYNTHESIS BI: CPT

## 2024-03-12 PROCEDURE — 77066 DX MAMMO INCL CAD BI: CPT | Performed by: RADIOLOGY

## 2024-03-12 PROCEDURE — 77062 BREAST TOMOSYNTHESIS BI: CPT | Performed by: RADIOLOGY

## 2024-03-12 PROCEDURE — 1036F TOBACCO NON-USER: CPT | Performed by: SURGERY

## 2024-03-12 ASSESSMENT — ENCOUNTER SYMPTOMS
CARDIOVASCULAR NEGATIVE: 1
HEMATOLOGIC/LYMPHATIC NEGATIVE: 1
ALLERGIC/IMMUNOLOGIC NEGATIVE: 1
ENDOCRINE NEGATIVE: 1
NEUROLOGICAL NEGATIVE: 1
EYES NEGATIVE: 1
MUSCULOSKELETAL NEGATIVE: 1
GASTROINTESTINAL NEGATIVE: 1
CONSTITUTIONAL NEGATIVE: 1
PSYCHIATRIC NEGATIVE: 1
RESPIRATORY NEGATIVE: 1

## 2024-03-12 ASSESSMENT — PAIN SCALES - GENERAL: PAINLEVEL: 0-NO PAIN

## 2024-03-12 NOTE — PROGRESS NOTES
Lisseth Honeycutt is a 44 y.o. female with a history of right breast cancer:   Staging form: Breast, AJCC 8th Edition  - Clinical stage from 1/19/2023: Stage IIB (cT1c, cN1(f), cM0, G3, ER-, WY-, HER2-)   - Pathologic stage from 8/25/2023: No Stage Recommended (ypT0, pN0(sn), cM0, G3, ER-, WY-, HER2-)      Breast cancer history:  On 1/12/23 diagnostic mammogram and ultrasound confirmed a 1.1 x 0.9 x 1.1 cm hypoechoic mass at 8:00, 6 cm FN in the right breast. At 9:00, 6 cm FN, a 0.5 cm mass noted, possible an intramammary LN. The right axillary LN’s appeared normal.    On 1/19/23 right breast US-guided CNB of the 8:00 mass showed IDC, grade 3, ER/WY-negative Her2-negative. 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    Genetic testing performed, negative for pathogenic mutations.    She met with Dr. Knight in medical oncology. Neoadjuvant chemoimmunotherapy (KEYNOTE-522 regimen) initiated on 2/16/23: Pembrolizumab with carboplatin/paclitaxel, followed by doxorubicin/cyclophosphamide. This was completed 7/20/23.    On 8/25/23 she underwent right lumpectomy/SLNBx performed by Dr. Tate. No residual carcinoma noted. 0/4 LN's (one sentinel node) involved, ypT0 ypN0.    She received adjuvant Pembrolizumab from 10/19/23 to 12/28/2023. She completed 4 cycles in the adjuvant setting,  discontinued for patient preference and development of arthritis. Radiation therapy completed on 12/12/2023 with Dr. Hernandez. She met with Dr. Fuchs in integrative medicine; notes improvements in hot flashes with strategies including acupuncture.    Today 3/12/2024, bilateral diagnostic mammogram showed no mammographic evidence of malignancy in either breast. New post treatment changes on the right. BI-RADS Category 2.     She is feeling well overall. Continues to note mild right chest wall tightness, which improves with yoga/stretching. Has not noted any breast masses or nodules, skin or  nipple changes, nipple discharge, or axillary lymphadenopathy bilaterally.     GYN history: Menarche age 14. Premenopausal, LMP 2024.  Age at first birth 35. Breast feeding both children 6 months each. Use of OCP's for 10 years. No use fertility drugs.     Family history: Mother with breast cancer at age 55, no genetic testing, s/p lumpectomy and radiation   Maternal grandmother at age 63, mastectomy  Father with prostate cancer  Maternal grandfather with colon cancer  Paternal grandfather with prostate    Past Medical History:   Diagnosis Date    Anxiety     Bacterial vaginitis 2023    Breast cancer (CMS/HCC)     Herpes simplex vulvovaginitis 2023    Hx antineoplastic chemo     Personal history of irradiation     Vaginal discharge 2023    Viral illness 2023     Current Outpatient Medications on File Prior to Visit   Medication Sig Dispense Refill    EPINEPHrine 0.3 mg/0.3 mL injection syringe use as directed      loratadine (Claritin) 10 mg tablet Take 1 tablet (10 mg) by mouth once daily.       No current facility-administered medications on file prior to visit.     Review of Systems   Constitutional: Negative.    HENT: Negative.     Eyes: Negative.    Respiratory: Negative.     Cardiovascular: Negative.    Gastrointestinal: Negative.    Endocrine: Negative.    Genitourinary: Negative.    Musculoskeletal: Negative.    Skin: Negative.    Allergic/Immunologic: Negative.    Neurological: Negative.    Hematological: Negative.    Psychiatric/Behavioral: Negative.     All other systems reviewed and are negative.    Objective   Physical Exam  General: Otherwise healthy appearing. No acute distress.     HEENT: Conjunctiva well-colored, sclera non-icteric. Neck supple, trachea midline. No lymphadenopathy or thyromegaly.     Cardiovascular: Regular rate and rhythm.    Respiratory: Clear to auscultation bilaterally.     Breast: Exam performed in the sitting and supine positions. Right breast:  8:00, 7 cm FN, well healed incision, mild radiation skin changes. No obvious abnormalities palpable. No nipple changes. Well healed axillary incision. Left breast: No obvious abnormalities palpable. No skin or nipple changes.     Abdomen: Soft, non-tender, non-distended.    Extremities: Atraumatic, no edema.     Neurologic: Motor and sensory grossly intact. Alert and oriented.     Lymphatic: No axillary, supraclavicular, or infraclavicular lymphadenopathy bilaterally.     Assessment/Plan   Today we reviewed your pertinent history, physical exam, imaging, pathology, and treatment for breast cancer. You have completed chemotherapy, surgery, and radiation. Your clinical exam and imaging today show no worrisome findings.     All questions were answered in detail, and we are in agreement with the following plan:   1. Please continue follow-up with Dr. Knight in medical oncology.    2. Please follow up in radiation oncology as scheduled.   3. Please return for an office visit and exam with me September 2024 .You will be due for bilateral mammogram March 2025.   4. We discussed referral to Dr. Tobar in PM&R for chest wall tightness. Please call the office if you would like to be referred.     If you have any questions, concerns, or changes in your breast self-exam prior to our next visit, please call my office at the number below. I look forward to seeing you again soon.     Bruna Dyer MD   Breast Surgical Oncology Department of Surgery   [facility]   Office: [phone] (option #2)   Fax: [phone]     DISCLAIMER: Speech recognition software was used to create portions of this document. While an attempt at proofreading has been made, minor errors in transcription may be present.    Diagnoses and all orders for this visit:  Malignant neoplasm of female breast, unspecified estrogen receptor status, unspecified laterality, unspecified site of breast (CMS/HCC)  -     BI  mammo bilateral diagnostic tomosynthesis; Future  Encounter for follow-up surveillance of breast cancer  Status post partial mastectomy of right breast  S/P radiation therapy  S/P chemotherapy, time since less than 4 weeks  Family history of breast cancer  Arthralgia, unspecified joint

## 2024-03-13 ENCOUNTER — APPOINTMENT (OUTPATIENT)
Dept: INTEGRATIVE MEDICINE | Facility: CLINIC | Age: 44
End: 2024-03-13
Payer: COMMERCIAL

## 2024-03-27 ENCOUNTER — APPOINTMENT (OUTPATIENT)
Dept: INTEGRATIVE MEDICINE | Facility: CLINIC | Age: 44
End: 2024-03-27
Payer: COMMERCIAL

## 2024-04-02 ENCOUNTER — OFFICE VISIT (OUTPATIENT)
Dept: OBSTETRICS AND GYNECOLOGY | Facility: CLINIC | Age: 44
End: 2024-04-02
Payer: COMMERCIAL

## 2024-04-02 VITALS — WEIGHT: 155 LBS | BODY MASS INDEX: 24.91 KG/M2 | HEIGHT: 66 IN

## 2024-04-02 DIAGNOSIS — Z11.51 SCREENING FOR HUMAN PAPILLOMAVIRUS (HPV): ICD-10-CM

## 2024-04-02 DIAGNOSIS — B00.9 HSV INFECTION: ICD-10-CM

## 2024-04-02 DIAGNOSIS — Z80.3 FAMILY HISTORY OF BREAST CANCER: ICD-10-CM

## 2024-04-02 DIAGNOSIS — Z01.419 WELL WOMAN EXAM WITH ROUTINE GYNECOLOGICAL EXAM: Primary | ICD-10-CM

## 2024-04-02 DIAGNOSIS — Z85.3 HISTORY OF RIGHT BREAST CANCER: ICD-10-CM

## 2024-04-02 PROCEDURE — 99386 PREV VISIT NEW AGE 40-64: CPT

## 2024-04-02 PROCEDURE — 1036F TOBACCO NON-USER: CPT

## 2024-04-02 PROCEDURE — 88175 CYTOPATH C/V AUTO FLUID REDO: CPT | Mod: TC,GCY

## 2024-04-02 PROCEDURE — 87624 HPV HI-RISK TYP POOLED RSLT: CPT

## 2024-04-02 RX ORDER — VALACYCLOVIR HYDROCHLORIDE 500 MG/1
500 TABLET, FILM COATED ORAL 2 TIMES DAILY
Qty: 6 TABLET | Refills: 2 | Status: SHIPPED | OUTPATIENT
Start: 2024-04-02 | End: 2024-04-05

## 2024-04-02 ASSESSMENT — LIFESTYLE VARIABLES
SKIP TO QUESTIONS 9-10: 1
HOW OFTEN DO YOU HAVE SIX OR MORE DRINKS ON ONE OCCASION: NEVER
AUDIT-C TOTAL SCORE: 1
HOW OFTEN DO YOU HAVE A DRINK CONTAINING ALCOHOL: MONTHLY OR LESS
HOW MANY STANDARD DRINKS CONTAINING ALCOHOL DO YOU HAVE ON A TYPICAL DAY: 1 OR 2

## 2024-04-02 ASSESSMENT — ENCOUNTER SYMPTOMS
DEPRESSION: 0
COLOR CHANGE: 0
FATIGUE: 0
LOSS OF SENSATION IN FEET: 0
OCCASIONAL FEELINGS OF UNSTEADINESS: 0
ABDOMINAL PAIN: 0
COUGH: 0
HEADACHES: 0
CHILLS: 0
VOMITING: 0
SHORTNESS OF BREATH: 0
DYSURIA: 0
FEVER: 0
UNEXPECTED WEIGHT CHANGE: 0
DIZZINESS: 0
NAUSEA: 0

## 2024-04-02 ASSESSMENT — PATIENT HEALTH QUESTIONNAIRE - PHQ9
SUM OF ALL RESPONSES TO PHQ9 QUESTIONS 1 & 2: 0
1. LITTLE INTEREST OR PLEASURE IN DOING THINGS: NOT AT ALL
2. FEELING DOWN, DEPRESSED OR HOPELESS: NOT AT ALL

## 2024-04-02 ASSESSMENT — PAIN SCALES - GENERAL: PAINLEVEL: 0-NO PAIN

## 2024-04-02 NOTE — PROGRESS NOTES
"Lisseth Honeycutt is a 44 y.o. female who is here for a routine GYN exam to establish care as a new patient. Had Paragard inserted in ; menses more recently have become regular again; had been absent since May of last year with breast CA dx/tx. Found RIGHT breast lump 2022, evaluated and had mammogram done which came back suspicious, bx obtained and dx'ed with R breast IDC, triple negative; genetic testing negative. Underwent chemotherapy, surgery, and radiation - finished radiation tx December. Now following with multiple providers - Dr. Knight, Dr. Fuchs, Dr. Dyer. Most recent appt with surg onc 3/12/24, dx jacqueline benign. Upcoming appt with [facility] in July as well. Fam hx breast cancer (mom, MGM). Positive hx HSV - needs Valtrex refills.    Complaints:   none  Periods: regular  Dysmenorrhea:  none    Current contraception: Paragard IUD  History of abnormal Pap smear: no  History of abnormal mammogram: yes, hx RIGHT breast CA      OB History          2    Para   2    Term   2            AB        Living   2         SAB        IAB        Ectopic        Multiple        Live Births   2                  Review of Systems   Constitutional:  Negative for chills, fatigue, fever and unexpected weight change.   Respiratory:  Negative for cough and shortness of breath.    Gastrointestinal:  Negative for abdominal pain, nausea and vomiting.   Genitourinary:  Negative for dyspareunia, dysuria, pelvic pain and vaginal discharge.   Skin:  Negative for color change and rash.   Neurological:  Negative for dizziness and headaches.       Objective   Ht 1.676 m (5' 6\")   Wt 70.3 kg (155 lb)   BMI 25.02 kg/m²        General:   Alert and oriented, in no acute distress   Neck: Supple. No visible thyromegaly.    Breast/Axilla: Deferred; recently examined at appt with surg onc, recent mammogram benign   Abdomen: Soft, non-tender, without masses or organomegaly   Vulva: Normal architecture without erythema, masses, " or lesions.    Vagina: Normal mucosa without lesions, masses, or atrophy. No abnormal vaginal discharge.    Cervix: Normal without masses, lesions, or signs of cervicitis; IUD strings from os; pap performed    Uterus: Normal, mobile, non-enlarged uterus   Adnexa: Normal without masses or lesions   Pelvic Floor Normal    Psych Normal affect. Normal mood.      Assessment/Plan   -Due for pap in a few months, obtained today.  -UTD on mammogram s/p breast cancer tx; continue breast care with current team.  -Paragard effective until 2030.  -Refills sent for Valtrex with pos hx HSV.    Nathalie Ba PA-C

## 2024-04-03 ENCOUNTER — APPOINTMENT (OUTPATIENT)
Dept: INTEGRATIVE MEDICINE | Facility: CLINIC | Age: 44
End: 2024-04-03
Payer: COMMERCIAL

## 2024-04-03 ENCOUNTER — ALLIED HEALTH (OUTPATIENT)
Dept: INTEGRATIVE MEDICINE | Facility: CLINIC | Age: 44
End: 2024-04-03
Payer: COMMERCIAL

## 2024-04-03 DIAGNOSIS — C50.511 MALIGNANT NEOPLASM OF LOWER-OUTER QUADRANT OF RIGHT BREAST OF FEMALE, ESTROGEN RECEPTOR NEGATIVE (MULTI): ICD-10-CM

## 2024-04-03 DIAGNOSIS — M25.50 ARTHRALGIA, UNSPECIFIED JOINT: ICD-10-CM

## 2024-04-03 DIAGNOSIS — M25.559 ARTHRALGIA OF HIP, UNSPECIFIED LATERALITY: ICD-10-CM

## 2024-04-03 DIAGNOSIS — R63.0 POOR APPETITE: ICD-10-CM

## 2024-04-03 DIAGNOSIS — Z17.1 MALIGNANT NEOPLASM OF LOWER-OUTER QUADRANT OF RIGHT BREAST OF FEMALE, ESTROGEN RECEPTOR NEGATIVE (MULTI): ICD-10-CM

## 2024-04-03 DIAGNOSIS — R60.9 SWELLING: Primary | ICD-10-CM

## 2024-04-03 PROCEDURE — 99214 OFFICE O/P EST MOD 30 MIN: CPT | Performed by: HOSPITALIST

## 2024-04-03 ASSESSMENT — PAIN SCALES - GENERAL: PAINLEVEL_OUTOF10: 1

## 2024-04-03 NOTE — PROGRESS NOTES
Massage Therapy Visit:     Jose Honeycutt was referred by Dr. Fuchs..    Condition of Client Subjective :  Patient ID: Jose Honeycutt is a 44 y.o. female who presents for reason for a 50 minute restorative massage.  Patient was gotten massages in the past.  She is being treated for right breast cancer.  Her right arm and shoulder are tight.  After the massage she noticed increased ROM on the right side.  She carries her stress in her neck and shoulders.  Patient did notice an improvement with her muscle discomfort.  She was able to relax.     Session Information  Visit Type: New patient  Description of present complaint: Muscle tension        Objective   Pre-treatment Assessment  Arrival Mode: Ambulatory  Pain Score: 3  Anxiety Level (0-10): 0  Stress Level (0-10): 0  Coping Level (0-10): 9  Depression Level (0-10): 0  Fatigue Level (0-10): 2  Nausea Level (0-10): 0  Wellbeing Level (0-10): 3    Physical Exam    Actions Assessment/Plan :  Provider reviewed plan for the massage session, precautions and contraindications. Patient/guardian/hospital staff has given consent to treat with full understanding of what to expect during the session. Before massage therapy began, provider explained to the patient to communicate at any time if the pressure was causing discomfort past their tolerance level. Patient agreed to advise therapist.    Massage Treatment  Patient Position: Table  Positioning Assistance: Did not need assistance  Massage Technique: Relaxation massage  Pressure Scale: 2 - Mild pressure, 3 - Medium pressure    Response:  Post-treatment Assessment  Patient Fell Asleep During Treatment: No  Patient Noted Improvement of the Following Symptoms: Muscle tension, ROM  Pain Score: 1  Anxiety Level (0-10): 0  Stress Level (0-10): 0  Coping Level (0-10): 10  Depression Level (0-10): 0  Fatigue Level (0-10): 1  Nausea Level (0-10): 0  Wellbeing Level (0-10): 0    Evaluation:   Patient will follow up as needed.

## 2024-04-03 NOTE — PROGRESS NOTES
Patient ID: Jose Honeycutt is a 44 y.o. female.  Referring Physician: No referring provider defined for this encounter.  Primary Care Provider: JACKELYN Jorgensen    CANCER HISTORY:   44 yo woman with R breast IDC, triple negative  Genetics negative  Patient has 2 children, and notes completion of family building.      PMH: SVT/Afib, anxiety, triple negative breast CA   Psych History: Post partum depression/anxiety following birth of son,   generalized anxiety at baseline, is actively doing talk therapy   Surg Hx: catheter ablation 2020 for SVT      Treatment History:   Neoadjuvant carboplatin, paclitaxel, pembrolizumab; followed by doxorubicin,   cyclophosphamide, pembrolizumab (per KEYNOTE-522 protocol):      C1D1 2/16/23 (pembrolizumab was received, but developed a grade 3 infusion   reaction to paclitaxel).   - Switched to Abraxane/carboplatin for week #2. No issues.   - Had possible reaction versus anxiety attack during week #3. Treatment was   held.   Plan to have re-evaluation with surgery and adjuvant AC/keytruda.   Last immunotherapy end of this month     now s/p surgery  Radiation - causing lots of fatigue, pain  Now completed all therapy     History of Present Illness Consulted by: Dr. Knight  For: breast cancer     Started AC - c/b fatigue but o/w doing well - feeling good now overall  Bloating but o/w doing well, contd and trying to drink tea and eat smaller portions - improved with similase     Chief complaints and symptoms:  Managing chemo side effects without pharmaceuticals  Menstrual irreg/premature menopause - very limited right now - has some anxiety around periods     Anxiety and diff sleeping - improved significantly but cutting out caffeine, being active - doing well now  waking up in middle of night  improved anxiety and insomnia - ok now     Arthralgias - fingers, shoulders, hurts.  Started a few weeks ago, foot and hip for 3 months     More nausea now, drinking kathe tea     Hot  flashes - improved, almost gone     Fatigue - doing well, has for a few days after chemo, now done, still tired        GERD symptoms with chemo, now with bloating  Occas chest pains, burning feeling     Interested in: acupuncture, Chinese herbs, guided imagery, nutrition, stress management     Diet: Eating very well overall, cut out fast food/junk food  More plant based - contd  Doing well overall, has made food changes we suggested     PA: walking 2-3 times/week, using treadmill, not steady right now     Sleep:Diff staying asleep, racing thoughts usually middle of the night - now sleeping better  Doing morning meditation, sleep hygiene  Journals prior to sleeping  Sleeping well now, cut out caffeine  no diff falling asleep but diff staying asleep     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   On all of the above she noted not experiencing any PMS or anxiety. She is   currently only taking gerry root/borage oil and magnesium.      CoQ10 100 mg/day  Vit D3 1000 IU/day  Fish Oil  Gerry Root  Mag  claritin  Lorazepam as needed  Protonix   Consulted by: Dr. Knight  For: breast cancer     Started AC - c/b fatigue but o/w doing well - feeling good now overall  Bloating but o/w doing well, contd and trying to drink tea and eat smaller portions - improved with similase    ROS:  no ha, visual symptoms, hearing loss  no sob, chest pain, palp  ROS o/w non contributory, please see HPI    Objective    BSA: There is no height or weight on file to calculate BSA.  There were no vitals taken for this visit.    PHYSICAL EXAM:  NAD, awake/alert  HEENT, NCAT, OP clear, no oral lesions  CTA  bilat  RRR no mgr  Abd soft/nt/nd+bs  No c/c/e/ttp  Motor/sensory intact, CN 2-12 intact     RESULTS:  Lab Results   Component Value Date    WBC 3.8 (L) 12/28/2023    HGB 12.0 12/28/2023    HCT 35.2 (L) 12/28/2023     12/28/2023    CREATININE 0.55 12/28/2023    AST 20 12/28/2023       Assessment/Plan   Cancer Staging   Malignant neoplasm of female breast (CMS/HCC)  Staging form: Breast, AJCC 8th Edition  - Clinical stage from 1/19/2023: Stage IIB (cT1c, cN1(f), cM0, G3, ER-, CA-, HER2-) - Signed by Mango Hernandez MD on 10/11/2023  - Pathologic stage from 8/25/2023: No Stage Recommended (ypT0, pN0(sn), cM0, G3, ER-, CA-, HER2-) - Signed by Mango Hernandez MD on 10/11/2023      CANCER SPECIFIC RECCS:  42 yo woman with triple negative breast cancer on neoadj carbo/abraxane/keytruda f/b surgery then AC/keytruda as plan.  Doing well on AC/pembro chemo      Breast cancer:  Whole Foods plant based diet  Limit sugar  Limit alcohol  Anticancer Lifestyle Program  Read: Anticancer Living     VIOME testing     Arthralgias - using turmeric in food  Consider motrin prn  IMO likely related to immunotherapy - does not need treatment now, ending immunotherapy end of the month, but if worsens then can discuss further with Dr. Knight.  Cont acupuncture  [place] 3  Yoga  Massage     Exercise 30 min/day 5 days a week, vary by balance, cardio and resistance training  Continue walking now  Some fatigue - consider American ginseng 2 grams/day     Supplements to consider:  Melatonin 1-3 mg at night 1 hr prior to sleep - not taking  Vitamin D3 7415-3822 IU/day  Omega 3 supplementation (nordic naturals)     Sleep - try to get over 7 hours/night  Limit TV or electronics at night  Limit caffeine intake and only to the morning  Get exposure to light in the morning if possible  Meditation and/or breathing exercises in the morning are helpful  5 min breathing exercises: Alternate Nostril Breathing and Derek Corby breathing  Continue walking  middle of the day     Sleep hygiene - should try to sleep by 10 pm  Limit TV or exposure to light at night including cell phones  Limit caffeine to AM only if needed (e.g. coffee)  Morning exposure to light, getting outside or bright white light in the morning  [place] Tea  Consider melatonin   Consider massage  Consider similase (integrative therapeutics) for bloating     Irreg periods - likely from chemo, f/b endocrine - not having now     Anxiety - has tried SSRI in past without relief. Manifested with sleep interruption and cardiac symptoms.  - Patient connected with [facility] for peer support   - Patient currently seeing therapist for talk therapy   - Referral made to onco-psychiatry to explore other medication management   Doing better overall, meditating in am  strategies -- maybe beta blocker propranolol/atenolol?   cognitive behavioral therapy - seeing her as well, has improved symptoms now     Hot Flashes: improved overall, some returning last few weeks  Acupuncture - have an appt - next month, symptom management clinic but seeing someone else for a few weeks, helping  Yoga is helpful as is stress management  Consider Acteane (Boiron, homeopathic)  Relazen (Swedish Herb)     F/u in symptom management        SYMPTOM MANAGEMENT:  Integrative Oncology Symptom Management:     The integrative oncology symptom management clinic offers supervised care of cancer patients using Integrative Modalities billed to insurance using NCCN and SIO/ASCO guideline-driven practices.  ESAS is obtained prior to and after each treatment by practitioner     Symptoms Managed:  Hot Flashes - resumed but decreased since last visit, a few a day now, mostly during day but improving  Acupuncture usually helps     Nausea - improved     Fatigue - gone     Anxiety/Insomnia - intermittent, starting radiation soon, still not sleeping well     Pain - joint stiffness in knees/hips, continued now but overall improved  hip pain R  and R foot pain nael after walking (not during)  now overall improved otherwise  increased activity  Foot and hip pain improved, but intermittently feels joint swellings/pain and then improves  R shoulder stiffness/pain  Intermittent joint pains     Bloating - eating anything, trying smaller portions, much improved     Natural Products utilized:  CoQ10 100 mg/day  Vit D3 1000 IU/day  Fish Oil  Danielle Root  Mag  claritin  Lorazepam as needed  Protonix   Similase     Integrative Treatment: Massage     Session #: 1  Frequency: Weekly     Referrals: Yoga  Massage     Recommendations:  Continue CBT  Suggest trying essential oils - lavender or peppermint in diffuser for hot flashes  Acteane or relazen  Consider meds like cymbalta for pain and hot flashes o/w  Consider icing joints when inflamed     Follow Up:  Symptom Management: Weekly  Integrative Oncology: 3 mo     I have personally seen the patient and supervised the treatment by the integrative practitioner during this visit.    Joseph Fuchs MD

## 2024-04-10 ENCOUNTER — ALLIED HEALTH (OUTPATIENT)
Dept: INTEGRATIVE MEDICINE | Facility: CLINIC | Age: 44
End: 2024-04-10

## 2024-04-10 DIAGNOSIS — M25.50 ARTHRALGIA, UNSPECIFIED JOINT: Primary | ICD-10-CM

## 2024-04-10 PROCEDURE — 97139 UNLISTED THERAPEUTIC PX: CPT | Performed by: ACUPUNCTURIST

## 2024-04-10 NOTE — PROGRESS NOTES
Acupuncture Visit:     Subjective   Patient ID: Jose Honeycutt is a 44 y.o. female who presents for No chief complaint on file.  Seeking support for joint pain including right sided lateral foot, right shoulder.          Session Information  Is this acupuncture treatment being billed to the patient's insurance company: No  Visit Type: Follow-up visit         Review of Systems         Provider reviewed plan for the acupuncture session, precautions and contraindications. Patient/guardian/hospital staff has given consent to treat with full understanding of what to expect during the session. Before acupuncture began, provider explained to the patient to communicate at any time if the procedure was causing discomfort past their tolerance level. Patient agreed to advise acupuncturist. The acupuncturist counseled the patient on the risks of acupuncture treatment including pain, infection, bleeding, and no relief of pain. The patient was positioned comfortably. There was no evidence of infection at the site of needle insertions.    Objective   Physical Exam              Acupuncture Treatment  Needle Guage: 40 guage /.16/ Red seirin, 36 guage /.20/ Blue seirin, 42 guage /.14/ Lime green seirin  Body Points - Left: emma 5, lr 4  Body Points - Bilateral: st qix1, gb 34, sp 9, 3.2, gb 34  Body Points - Right: gb 21  Needle Count In: 13  Needle Count Out: 13  Needle Retention Time (min): 25 minutes  Total Face to Face Time (min): 25 minutes              Assessment/Plan

## 2024-04-12 LAB
CYTOLOGY CMNT CVX/VAG CYTO-IMP: NORMAL
HPV HR 12 DNA GENITAL QL NAA+PROBE: NEGATIVE
HPV HR GENOTYPES PNL CVX NAA+PROBE: NEGATIVE
HPV16 DNA SPEC QL NAA+PROBE: NEGATIVE
HPV18 DNA SPEC QL NAA+PROBE: NEGATIVE
LAB AP HPV GENOTYPE QUESTION: YES
LAB AP HPV HR: NORMAL
LABORATORY COMMENT REPORT: NORMAL
PATH REPORT.TOTAL CANCER: NORMAL

## 2024-04-17 ENCOUNTER — ALLIED HEALTH (OUTPATIENT)
Dept: INTEGRATIVE MEDICINE | Facility: CLINIC | Age: 44
End: 2024-04-17
Payer: COMMERCIAL

## 2024-04-17 DIAGNOSIS — R60.9 SWELLING: ICD-10-CM

## 2024-04-17 DIAGNOSIS — M25.50 ARTHRALGIA, UNSPECIFIED JOINT: ICD-10-CM

## 2024-04-17 DIAGNOSIS — C50.511 MALIGNANT NEOPLASM OF LOWER-OUTER QUADRANT OF RIGHT BREAST OF FEMALE, ESTROGEN RECEPTOR NEGATIVE (MULTI): ICD-10-CM

## 2024-04-17 DIAGNOSIS — Z17.1 MALIGNANT NEOPLASM OF LOWER-OUTER QUADRANT OF RIGHT BREAST OF FEMALE, ESTROGEN RECEPTOR NEGATIVE (MULTI): ICD-10-CM

## 2024-04-17 DIAGNOSIS — R63.0 POOR APPETITE: ICD-10-CM

## 2024-04-17 DIAGNOSIS — M25.559 ARTHRALGIA OF HIP, UNSPECIFIED LATERALITY: Primary | ICD-10-CM

## 2024-04-17 PROCEDURE — 97140 MANUAL THERAPY 1/> REGIONS: CPT | Performed by: HOSPITALIST

## 2024-04-17 ASSESSMENT — PAIN SCALES - GENERAL: PAINLEVEL_OUTOF10: 1

## 2024-04-17 NOTE — PROGRESS NOTES
"Massage Therapy Visit:     Jose Honeycutt was referred by Dr. Fuchs.    Condition of Client Subjective :  Patient ID: Jose Honeycutt is a 44 y.o. female who presents for a 40 minute restorative Manual Therapy.  Patient is being treated for breast cancer.  Patient has tight neck, shoulders and back.  I focused on her upper torso.  Patient stated that \"she is feeling better\"  She stated that her hip discomfort has improvement.  She noticed an improvement with her overall muscle discomfort.  She was able to relax.      Session Information  Visit Type: Follow-up visit  Description of present complaint: Muscle tension        Objective   Pre-treatment Assessment  Arrival Mode: Ambulatory  Pain Score: 3  Anxiety Level (0-10): 0  Stress Level (0-10): 0  Coping Level (0-10): 8  Depression Level (0-10): 0  Fatigue Level (0-10): 0  Nausea Level (0-10): 0  Wellbeing Level (0-10): 2      Actions Assessment/Plan :  Provider reviewed plan for the massage session, precautions and contraindications. Patient/guardian/hospital staff has given consent to treat with full understanding of what to expect during the session. Before massage therapy began, provider explained to the patient to communicate at any time if the pressure was causing discomfort past their tolerance level. Patient agreed to advise therapist.    Massage Treatment  Patient Position: Table  Positioning Assistance: Did not need assistance  Massage Technique: Relaxation massage  Pressure Scale: 2 - Mild pressure, 3 - Medium pressure    Response:  Post-treatment Assessment  Patient Fell Asleep During Treatment: No  Patient Noted Improvement of the Following Symptoms: Muscle tension  Pain Score: 1  Anxiety Level (0-10): 0  Stress Level (0-10): 0  Coping Level (0-10): 9  Depression Level (0-10): 0  Fatigue Level (0-10): 0  Nausea Level (0-10): 0  Wellbeing Level (0-10): 0    Evaluation:   Patient will follow up as needed.          "

## 2024-04-24 ENCOUNTER — ALLIED HEALTH (OUTPATIENT)
Dept: INTEGRATIVE MEDICINE | Facility: CLINIC | Age: 44
End: 2024-04-24

## 2024-04-24 DIAGNOSIS — C50.511 MALIGNANT NEOPLASM OF LOWER-OUTER QUADRANT OF RIGHT BREAST OF FEMALE, ESTROGEN RECEPTOR NEGATIVE (MULTI): Primary | ICD-10-CM

## 2024-04-24 DIAGNOSIS — Z17.1 MALIGNANT NEOPLASM OF LOWER-OUTER QUADRANT OF RIGHT BREAST OF FEMALE, ESTROGEN RECEPTOR NEGATIVE (MULTI): Primary | ICD-10-CM

## 2024-04-24 DIAGNOSIS — M25.50 ARTHRALGIA, UNSPECIFIED JOINT: ICD-10-CM

## 2024-04-24 PROCEDURE — 97139 UNLISTED THERAPEUTIC PX: CPT | Performed by: NATUROPATH

## 2024-04-24 NOTE — PROGRESS NOTES
Acupuncture Visit:     Subjective   Patient ID: Jose Honeycutt is a 44 y.o. female who presents for No chief complaint on file.  HPI     Labs reviewed per Integrative oncology guidelines. ANC >1000, Platelets >20. No contraindications to treatment. Patient advised of usual risks of acupuncture including bleeding, bruising, and infection and verbalized understanding.       Joint pain increasing   Hands, feet   Right lower abdomen  Has has massage in right upper arm from Bella grafkeira helps  Hands pinkey and ring finger MCP  Sleep- good,   Allergies, using meds        Hot flashes increased, worse at night  A few times a day  Sleep, waking often  More tightness in axilla, has been dong stretching,   Foot pain decreased, as well as hip pain.   Will get occ joint flares            Pre-treatment Assessment  Pain Score: 3  Anxiety Level (0-10): 0  Stress Level (0-10): 0  Coping Level (0-10): 9  Depression Level (0-10): 0  Fatigue Level (0-10): 0  Nausea Level (0-10): 0  Wellbeing Level (0-10): 2    Review of Systems         Provider reviewed plan for the acupuncture session, precautions and contraindications. Patient/guardian/hospital staff has given consent to treat with full understanding of what to expect during the session. Before acupuncture began, provider explained to the patient to communicate at any time if the procedure was causing discomfort past their tolerance level. Patient agreed to advise acupuncturist. The acupuncturist counseled the patient on the risks of acupuncture treatment including pain, infection, bleeding, and no relief of pain. The patient was positioned comfortably. There was no evidence of infection at the site of needle insertions.    Objective   Physical Exam     Points: ear Rubio men, KI, LR, LI11, LI4, HT7, SP6, KI7, KI3, KI6, LR3  add GB40 and bobby little toe MTP right   Heat lamp: none           Needles in/out: 22 +2                   Post-treatment Assessment  Pain Score: 1  Anxiety Level  (0-10): 0  Stress Level (0-10): 0  Coping Level (0-10): 9  Depression Level (0-10): 0  Fatigue Level (0-10): 0  Nausea Level (0-10): 0  Wellbeing Level (0-10): 1    Assessment/Plan   Diagnoses and all orders for this visit:  Malignant neoplasm of lower-outer quadrant of right breast of female, estrogen receptor negative (Multi) (Primary)  Arthralgia, unspecified joint

## 2024-04-30 ASSESSMENT — PAIN SCALES - GENERAL: PAINLEVEL_OUTOF10: 1

## 2024-05-01 ENCOUNTER — APPOINTMENT (OUTPATIENT)
Dept: INTEGRATIVE MEDICINE | Facility: CLINIC | Age: 44
End: 2024-05-01
Payer: COMMERCIAL

## 2024-05-14 ENCOUNTER — APPOINTMENT (OUTPATIENT)
Dept: OBSTETRICS AND GYNECOLOGY | Facility: CLINIC | Age: 44
End: 2024-05-14
Payer: COMMERCIAL

## 2024-05-29 ENCOUNTER — APPOINTMENT (OUTPATIENT)
Dept: INTEGRATIVE MEDICINE | Facility: CLINIC | Age: 44
End: 2024-05-29

## 2024-06-05 ENCOUNTER — APPOINTMENT (OUTPATIENT)
Dept: INTEGRATIVE MEDICINE | Facility: CLINIC | Age: 44
End: 2024-06-05
Payer: COMMERCIAL

## 2024-07-01 PROBLEM — M25.551 PAIN OF RIGHT HIP JOINT: Status: ACTIVE | Noted: 2023-10-19

## 2024-07-01 PROBLEM — R00.0 TACHYCARDIA: Status: ACTIVE | Noted: 2024-07-01

## 2024-07-01 PROBLEM — I10 HYPERTENSION: Status: ACTIVE | Noted: 2024-07-01

## 2024-07-01 PROBLEM — R14.0 ABDOMINAL BLOATING: Status: ACTIVE | Noted: 2024-07-01

## 2024-07-01 PROBLEM — C77.3 MALIGNANT NEOPLASM METASTATIC TO LYMPH NODE OF UPPER EXTREMITY (MULTI): Status: ACTIVE | Noted: 2023-01-19

## 2024-07-01 PROBLEM — T45.1X5A CHEMOTHERAPY-INDUCED NAUSEA: Status: ACTIVE | Noted: 2024-07-01

## 2024-07-01 PROBLEM — R11.0 CHEMOTHERAPY-INDUCED NAUSEA: Status: ACTIVE | Noted: 2024-07-01

## 2024-07-01 PROBLEM — T45.1X5A CHEMOTHERAPY-INDUCED FATIGUE: Status: ACTIVE | Noted: 2024-07-01

## 2024-07-01 PROBLEM — T80.90XA INFUSION REACTION: Status: ACTIVE | Noted: 2024-07-01

## 2024-07-01 PROBLEM — R41.841 COGNITIVE COMMUNICATION DEFICIT: Status: ACTIVE | Noted: 2023-04-26

## 2024-07-01 PROBLEM — S76.212A STRAIN OF LEFT GROIN: Status: ACTIVE | Noted: 2024-07-01

## 2024-07-01 PROBLEM — C50.911: Status: ACTIVE | Noted: 2024-07-01

## 2024-07-01 PROBLEM — R92.8 MAMMOGRAM ABNORMAL: Status: ACTIVE | Noted: 2023-01-10

## 2024-07-01 PROBLEM — T78.40XA ALLERGIC REACTION TO DRUG: Status: ACTIVE | Noted: 2024-07-01

## 2024-07-01 PROBLEM — N95.9 PREMENOPAUSAL PATIENT: Status: ACTIVE | Noted: 2024-07-01

## 2024-07-01 PROBLEM — I95.9 HYPOTENSION: Status: ACTIVE | Noted: 2024-07-01

## 2024-07-01 PROBLEM — Z86.79 HISTORY OF IRREGULAR HEARTBEAT: Status: ACTIVE | Noted: 2024-07-01

## 2024-07-01 PROBLEM — N94.3 PREMENSTRUAL SYMPTOM: Status: ACTIVE | Noted: 2024-07-01

## 2024-07-01 PROBLEM — R53.83 CHEMOTHERAPY-INDUCED FATIGUE: Status: ACTIVE | Noted: 2024-07-01

## 2024-07-01 PROBLEM — G47.9 SLEEP DISTURBANCE: Status: ACTIVE | Noted: 2024-07-01

## 2024-07-01 PROBLEM — R61 GENERALIZED HYPERHIDROSIS: Status: ACTIVE | Noted: 2023-02-16

## 2024-07-01 PROBLEM — R07.89 CHEST TIGHTNESS: Status: ACTIVE | Noted: 2024-07-01

## 2024-07-01 PROBLEM — N63.0 BREAST MASS IN FEMALE: Status: ACTIVE | Noted: 2023-01-12

## 2024-07-01 PROBLEM — L03.313 CELLULITIS OF CHEST WALL: Status: ACTIVE | Noted: 2024-07-01

## 2024-07-02 ENCOUNTER — APPOINTMENT (OUTPATIENT)
Dept: PRIMARY CARE | Facility: CLINIC | Age: 44
End: 2024-07-02
Payer: COMMERCIAL

## 2024-07-02 ENCOUNTER — LAB (OUTPATIENT)
Dept: LAB | Facility: LAB | Age: 44
End: 2024-07-02
Payer: COMMERCIAL

## 2024-07-02 VITALS
WEIGHT: 156.2 LBS | SYSTOLIC BLOOD PRESSURE: 126 MMHG | HEART RATE: 66 BPM | DIASTOLIC BLOOD PRESSURE: 70 MMHG | HEIGHT: 66 IN | OXYGEN SATURATION: 98 % | BODY MASS INDEX: 25.1 KG/M2

## 2024-07-02 DIAGNOSIS — R63.5 WEIGHT GAIN: ICD-10-CM

## 2024-07-02 DIAGNOSIS — M25.50 ARTHRALGIA, UNSPECIFIED JOINT: ICD-10-CM

## 2024-07-02 DIAGNOSIS — C50.919 MALIGNANT NEOPLASM OF FEMALE BREAST, UNSPECIFIED ESTROGEN RECEPTOR STATUS, UNSPECIFIED LATERALITY, UNSPECIFIED SITE OF BREAST (MULTI): Primary | ICD-10-CM

## 2024-07-02 DIAGNOSIS — C50.911 INFILTRATING DUCTAL CARCINOMA OF RIGHT BREAST, STAGE 3 (MULTI): ICD-10-CM

## 2024-07-02 DIAGNOSIS — L30.9 DERMATITIS: ICD-10-CM

## 2024-07-02 DIAGNOSIS — C50.919 MALIGNANT NEOPLASM OF FEMALE BREAST, UNSPECIFIED ESTROGEN RECEPTOR STATUS, UNSPECIFIED LATERALITY, UNSPECIFIED SITE OF BREAST (MULTI): ICD-10-CM

## 2024-07-02 DIAGNOSIS — F41.9 ANXIETY: ICD-10-CM

## 2024-07-02 DIAGNOSIS — C77.3 MALIGNANT NEOPLASM METASTATIC TO LYMPH NODE OF UPPER EXTREMITY (MULTI): ICD-10-CM

## 2024-07-02 DIAGNOSIS — Z29.89 ENCOUNTER FOR IMMUNOTHERAPY: ICD-10-CM

## 2024-07-02 DIAGNOSIS — M25.50 POLYARTHRALGIA: ICD-10-CM

## 2024-07-02 DIAGNOSIS — Z00.00 HEALTHCARE MAINTENANCE: ICD-10-CM

## 2024-07-02 DIAGNOSIS — Z00.00 ROUTINE ADULT HEALTH MAINTENANCE: Primary | ICD-10-CM

## 2024-07-02 LAB
25(OH)D3 SERPL-MCNC: 24 NG/ML (ref 30–100)
ALBUMIN SERPL BCP-MCNC: 4.2 G/DL (ref 3.4–5)
ALP SERPL-CCNC: 86 U/L (ref 33–110)
ALT SERPL W P-5'-P-CCNC: 15 U/L (ref 7–45)
ANION GAP SERPL CALC-SCNC: 9 MMOL/L (ref 10–20)
AST SERPL W P-5'-P-CCNC: 17 U/L (ref 9–39)
BASOPHILS # BLD AUTO: 0.02 X10*3/UL (ref 0–0.1)
BASOPHILS NFR BLD AUTO: 0.5 %
BILIRUB SERPL-MCNC: 0.4 MG/DL (ref 0–1.2)
BUN SERPL-MCNC: 9 MG/DL (ref 6–23)
CALCIUM SERPL-MCNC: 9 MG/DL (ref 8.6–10.3)
CHLORIDE SERPL-SCNC: 103 MMOL/L (ref 98–107)
CHOLEST SERPL-MCNC: 181 MG/DL (ref 0–199)
CHOLESTEROL/HDL RATIO: 3.9
CO2 SERPL-SCNC: 30 MMOL/L (ref 21–32)
CREAT SERPL-MCNC: 0.67 MG/DL (ref 0.5–1.05)
CRP SERPL-MCNC: 0.84 MG/DL
EGFRCR SERPLBLD CKD-EPI 2021: >90 ML/MIN/1.73M*2
EOSINOPHIL # BLD AUTO: 0.14 X10*3/UL (ref 0–0.7)
EOSINOPHIL NFR BLD AUTO: 3.4 %
ERYTHROCYTE [DISTWIDTH] IN BLOOD BY AUTOMATED COUNT: 13.4 % (ref 11.5–14.5)
ERYTHROCYTE [SEDIMENTATION RATE] IN BLOOD BY WESTERGREN METHOD: 17 MM/H (ref 0–20)
EST. AVERAGE GLUCOSE BLD GHB EST-MCNC: 97 MG/DL
GLUCOSE SERPL-MCNC: 63 MG/DL (ref 74–99)
HBA1C MFR BLD: 5 %
HCT VFR BLD AUTO: 39.3 % (ref 36–46)
HDLC SERPL-MCNC: 46.2 MG/DL
HGB BLD-MCNC: 12.7 G/DL (ref 12–16)
IMM GRANULOCYTES # BLD AUTO: 0.01 X10*3/UL (ref 0–0.7)
IMM GRANULOCYTES NFR BLD AUTO: 0.2 % (ref 0–0.9)
LDLC SERPL CALC-MCNC: 91 MG/DL
LYMPHOCYTES # BLD AUTO: 1.18 X10*3/UL (ref 1.2–4.8)
LYMPHOCYTES NFR BLD AUTO: 28.7 %
MCH RBC QN AUTO: 30.2 PG (ref 26–34)
MCHC RBC AUTO-ENTMCNC: 32.3 G/DL (ref 32–36)
MCV RBC AUTO: 94 FL (ref 80–100)
MONOCYTES # BLD AUTO: 0.38 X10*3/UL (ref 0.1–1)
MONOCYTES NFR BLD AUTO: 9.2 %
NEUTROPHILS # BLD AUTO: 2.38 X10*3/UL (ref 1.2–7.7)
NEUTROPHILS NFR BLD AUTO: 58 %
NON HDL CHOLESTEROL: 135 MG/DL (ref 0–149)
NRBC BLD-RTO: 0 /100 WBCS (ref 0–0)
PLATELET # BLD AUTO: 192 X10*3/UL (ref 150–450)
POTASSIUM SERPL-SCNC: 3.9 MMOL/L (ref 3.5–5.3)
PROT SERPL-MCNC: 6.7 G/DL (ref 6.4–8.2)
RBC # BLD AUTO: 4.2 X10*6/UL (ref 4–5.2)
RHEUMATOID FACT SER NEPH-ACNC: <10 IU/ML (ref 0–15)
SODIUM SERPL-SCNC: 138 MMOL/L (ref 136–145)
TRIGL SERPL-MCNC: 220 MG/DL (ref 0–149)
TSH SERPL-ACNC: 1.78 MIU/L (ref 0.44–3.98)
VLDL: 44 MG/DL (ref 0–40)
WBC # BLD AUTO: 4.1 X10*3/UL (ref 4.4–11.3)

## 2024-07-02 PROCEDURE — 86140 C-REACTIVE PROTEIN: CPT

## 2024-07-02 PROCEDURE — 86431 RHEUMATOID FACTOR QUANT: CPT

## 2024-07-02 PROCEDURE — 99214 OFFICE O/P EST MOD 30 MIN: CPT | Performed by: NURSE PRACTITIONER

## 2024-07-02 PROCEDURE — 1036F TOBACCO NON-USER: CPT | Performed by: NURSE PRACTITIONER

## 2024-07-02 PROCEDURE — 82306 VITAMIN D 25 HYDROXY: CPT

## 2024-07-02 PROCEDURE — 36415 COLL VENOUS BLD VENIPUNCTURE: CPT

## 2024-07-02 PROCEDURE — 85652 RBC SED RATE AUTOMATED: CPT

## 2024-07-02 PROCEDURE — 80061 LIPID PANEL: CPT

## 2024-07-02 PROCEDURE — 84443 ASSAY THYROID STIM HORMONE: CPT

## 2024-07-02 PROCEDURE — 3078F DIAST BP <80 MM HG: CPT | Performed by: NURSE PRACTITIONER

## 2024-07-02 PROCEDURE — 85025 COMPLETE CBC W/AUTO DIFF WBC: CPT

## 2024-07-02 PROCEDURE — 3074F SYST BP LT 130 MM HG: CPT | Performed by: NURSE PRACTITIONER

## 2024-07-02 PROCEDURE — 86038 ANTINUCLEAR ANTIBODIES: CPT

## 2024-07-02 PROCEDURE — 80053 COMPREHEN METABOLIC PANEL: CPT

## 2024-07-02 PROCEDURE — 83036 HEMOGLOBIN GLYCOSYLATED A1C: CPT

## 2024-07-02 RX ORDER — HYDROCORTISONE 25 MG/G
CREAM TOPICAL 2 TIMES DAILY PRN
Qty: 30 G | Refills: 2 | Status: SHIPPED | OUTPATIENT
Start: 2024-07-02 | End: 2025-07-02

## 2024-07-02 ASSESSMENT — ENCOUNTER SYMPTOMS
CHILLS: 0
SEIZURES: 0
WEAKNESS: 0
EYE PAIN: 0
TROUBLE SWALLOWING: 0
MYALGIAS: 0
JOINT SWELLING: 0
CONSTIPATION: 0
HEADACHES: 0
COLOR CHANGE: 1
DIFFICULTY URINATING: 0
DIARRHEA: 0
WOUND: 0
WHEEZING: 0
ADENOPATHY: 0
BRUISES/BLEEDS EASILY: 0
FATIGUE: 0
PALPITATIONS: 0
ABDOMINAL DISTENTION: 0
ABDOMINAL PAIN: 0
ROS SKIN COMMENTS: SEE HPI
FEVER: 0
SHORTNESS OF BREATH: 0
NAUSEA: 0
COUGH: 0
ARTHRALGIAS: 1
DIZZINESS: 0

## 2024-07-02 ASSESSMENT — COLUMBIA-SUICIDE SEVERITY RATING SCALE - C-SSRS
1. IN THE PAST MONTH, HAVE YOU WISHED YOU WERE DEAD OR WISHED YOU COULD GO TO SLEEP AND NOT WAKE UP?: NO
2. HAVE YOU ACTUALLY HAD ANY THOUGHTS OF KILLING YOURSELF?: NO
6. HAVE YOU EVER DONE ANYTHING, STARTED TO DO ANYTHING, OR PREPARED TO DO ANYTHING TO END YOUR LIFE?: NO

## 2024-07-02 NOTE — ASSESSMENT & PLAN NOTE
Secondary to immunotherapy?  Patient encouraged to continue stretching exercises to help with joint aches and pains.  She may utilize over-the-counter NSAIDs as needed for discomfort.  Provider to be notified for any persistent or worsening pain issues.

## 2024-07-02 NOTE — ASSESSMENT & PLAN NOTE
Secondary to chemo and radiation?  Breast cancer treatments known to slow metabolism and contribute to weight gain.  Patient encouraged to continue to eat a healthy diet and stay as active as possible

## 2024-07-02 NOTE — ASSESSMENT & PLAN NOTE
Patient completed radiation and chemotherapy last year.  She is to maintain routine follow-up with medical, radiation and surgical oncology on a routine basis for continued evaluation/treatment recommendations.

## 2024-07-02 NOTE — PROGRESS NOTES
"Subjective   Patient ID: Jose Honeycutt is a 44 y.o. female who presents for Follow-up (Itchy and warm chest that she had radiation on ).    Patient seen today for routine follow-up and to discuss new breast concern.  Patient seen sitting up in office, in no acute distress.  She is alert, oriented and appears in fair spirits.  Patient states that her right breast began itching approximately one week ago and she subsequently developed \"jabbing and stabbing pains\" at her healed incision sites from a previous breast cancer surgery.  The top of her right breast is now red the patient denies any associated warmth, open lesions or drainage.  She denies any fever, chills or other systemic concerns.  Patient admits to using a new lotion but states that she applied it all over her skin and not just to her right breast.  She denies any other changes in her day-to-day; no new soaps or detergents.  Patient has follow-up in place with her radiation oncologist next week.  She completed chemotherapy and radiation for breast cancer at the end of last year.  She has not had any additional concerns regarding her breast since then.  Patient denies any current issues with her appetite or staying hydrated.  She states that she gained approximately 20 pounds since completing her cancer treatments and is voicing frustration about this.  Patient states that her diet and activity level has not changed over the past several months.  She does state that chemotherapy originally induced menopause but over the past several months she is begun a normal menstrual cycle.  Patient plans on following up with an endocrinology/gynecology specialist for additional assessment/ recommendations regarding hormones and menstrual cycle.  Patient does not smoke and reports only occasional alcohol use.   Intermittent issue with joint aches and pains.  She states that this developed in her hands, feet and hips after starting immunotherapy.  Symptoms appear to " improved after this was discontinued and are also improved with stretching.  She takes no over-the-counter medications for her pain or discomfort.  Patient works from home and denies any issues with shortness of breath or chest pain upon exertion.  Patient denies any significant issues with mood or insomnia.  She states that she used to have issues with anxiety but that has significantly improved after she cut out caffeine from her diet.  Patient wears glasses and denies any concerns with headaches or blurred vision.  No dental concerns voiced at this time.  Medications reviewed.  No other acute concerns voiced at this time.           Current Outpatient Medications on File Prior to Visit   Medication Sig Dispense Refill    copper (Paragard) 380 square mm IUD 1 each by intrauterine route 1 time. Inserted about around 2020      EPINEPHrine 0.3 mg/0.3 mL injection syringe use as directed      loratadine (Claritin) 10 mg tablet Take 1 tablet (10 mg) by mouth once daily.       No current facility-administered medications on file prior to visit.       Past Medical History:   Diagnosis Date    Anxiety     Bacterial vaginitis 03/21/2023    Breast cancer (Multi)     Triple negative    Herpes simplex vulvovaginitis 03/21/2023    Hx antineoplastic chemo     Personal history of irradiation     Vaginal discharge 03/21/2023    Viral illness 03/21/2023        Past Surgical History:   Procedure Laterality Date    BREAST LUMPECTOMY Right         Family History   Problem Relation Name Age of Onset    Breast cancer Mother  50    Diabetes Father      Prostate cancer Father      Breast cancer Maternal Grandmother  65    Diabetes Maternal Grandmother          Review of Systems   Constitutional:  Negative for chills, fatigue and fever.   HENT:  Negative for dental problem and trouble swallowing.    Eyes:  Negative for pain and visual disturbance.        Wears glasses   Respiratory:  Negative for cough, shortness of breath and wheezing.   "  Cardiovascular:  Negative for chest pain, palpitations and leg swelling.   Gastrointestinal:  Negative for abdominal distention, abdominal pain, constipation, diarrhea and nausea.   Endocrine: Negative for cold intolerance and heat intolerance.   Genitourinary:  Negative for difficulty urinating.   Musculoskeletal:  Positive for arthralgias. Negative for gait problem, joint swelling and myalgias.        See HPI   Skin:  Positive for color change and rash. Negative for pallor and wound.        See HPI   Allergic/Immunologic: Negative for environmental allergies and food allergies.   Neurological:  Negative for dizziness, seizures, weakness and headaches.   Hematological:  Negative for adenopathy. Does not bruise/bleed easily.   Psychiatric/Behavioral:  Negative for behavioral problems.    All other systems reviewed and are negative.      Objective   /70   Pulse 66   Ht 1.676 m (5' 6\")   Wt 70.9 kg (156 lb 3.2 oz)   SpO2 98%   BMI 25.21 kg/m²     Physical Exam  Constitutional:       General: She is not in acute distress.     Appearance: Normal appearance. She is not toxic-appearing.   HENT:      Head: Normocephalic and atraumatic.      Right Ear: Tympanic membrane, ear canal and external ear normal.      Left Ear: Tympanic membrane, ear canal and external ear normal.      Nose: Nose normal.      Mouth/Throat:      Mouth: Mucous membranes are moist.      Pharynx: Oropharynx is clear.   Eyes:      Extraocular Movements: Extraocular movements intact.      Conjunctiva/sclera: Conjunctivae normal.      Pupils: Pupils are equal, round, and reactive to light.   Cardiovascular:      Rate and Rhythm: Normal rate and regular rhythm.      Pulses: Normal pulses.      Heart sounds: Normal heart sounds. No murmur heard.  Pulmonary:      Effort: Pulmonary effort is normal.      Breath sounds: Normal breath sounds. No wheezing.   Chest:   Breasts:     Right: Skin change present. No swelling, bleeding, inverted nipple, " mass, nipple discharge or tenderness.      Left: Normal.          Comments: Erythema but no warmth or open lesions  Abdominal:      General: Bowel sounds are normal.      Palpations: Abdomen is soft.   Musculoskeletal:         General: No swelling. Normal range of motion.      Cervical back: Normal range of motion and neck supple.   Skin:     General: Skin is warm and dry.      Findings: Erythema present.   Neurological:      General: No focal deficit present.      Mental Status: She is alert and oriented to person, place, and time. Mental status is at baseline.      Cranial Nerves: No cranial nerve deficit.      Motor: No weakness.   Psychiatric:         Mood and Affect: Mood normal.         Behavior: Behavior normal.         Thought Content: Thought content normal.         Judgment: Judgment normal.         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  She is to maintain routine follow-up with medical, radiation and surgical oncology on a routine basis for continued evaluation/treatment recommendations.         Routine adult health maintenance - Primary Z00.00     Blood work orders in place for monitoring purposes.  Patient to obtain today         Polyarthralgia M25.50     Secondary to immunotherapy?  Patient encouraged to continue stretching exercises to help with joint aches and pains.  She may utilize over-the-counter NSAIDs as needed for discomfort.  Provider to be notified for any persistent or worsening pain issues.         Dermatitis L30.9     Message sent via [employer] to patient's medical and radiation oncologists, Dr. Knight and Dr. Hernandez, regarding new erythema and pruritus to right breast.  Patient to maintain follow-up with radiation oncologist next week.  She  is to notify provider for any persistent or worsening skin concerns to this area.  Will trial hydrocortisone cream for symptom control         Relevant Medications    hydrocortisone 2.5 % cream    Weight gain R63.5     Secondary to chemo and radiation?  Breast cancer treatments known to slow metabolism and contribute to weight gain.  Patient encouraged to continue to eat a healthy diet and stay as active as possible

## 2024-07-02 NOTE — ASSESSMENT & PLAN NOTE
Message sent via [employer] to patient's medical and radiation oncologists, Dr. Knight and Dr. Hernandez, regarding new erythema and pruritus to right breast.  Patient to maintain follow-up with radiation oncologist next week.  She is to notify provider for any persistent or worsening skin concerns to this area.  Will trial hydrocortisone cream for symptom control

## 2024-07-03 NOTE — PROGRESS NOTES
Patient ID: Jose Honeycutt is a 44 y.o. female.  MRN: [id number]  : 1980  The patient presents to clinic today for her history of breast cancer.     Cancer Staging   Malignant neoplasm of female breast (Multi)  Staging form: Breast, AJCC 8th Edition  - Clinical stage from 2023: Stage IIB (cT1c, cN1(f), cM0, G3, ER-, OR-, HER2-) - Signed by Mango Hernandez MD on 10/11/2023  - Pathologic stage from 2023: No Stage Recommended (ypT0, pN0(sn), cM0, G3, ER-, OR-, HER2-) - Signed by Mango Hernandez MD on 10/11/2023    Diagnostic/Therapeutic History:  -1/10/23: Bilateral screening mammogram showed a 1.2 cm density in right breast, as well as a 7mm intramammary LN.  -23: Dx MG/US confirmed a 1.1 x 0.9 x 1.1 cm hypoechoic mass at 8:00, 6 cm FN. At 9:00, 6 cm FN, a 0.5 cm mass noted, possible an intramammary LN. The right axillary LN’s appeared normal.  -22: US-guided CNB of 8:00 mass showed IDC, grade 3, ER/OR-negative Her2-negative (1+ IHC). 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  -Genetic testing performed, negative for pathogenic mutations.  -Neoadjuvant chemoimmunotherapy (KEYNOTE-522 regimen) initiated 23: Pembrolizumab with carboplatin/paclitaxel, followed by doxorubicin/cyclophosphamide. Completed 23.  -23: Right lumpectomy/SLNBx performed. No residual carcinoma noted. 0/4 LN's (one sentinel node) involved. ypT0 ypN0.  - Adjuvant Pembrolizumab -10/19/23 - 2023 (completed 4 cycles adjuvant setting, discontinued for patient preference and development of arthritis).  - Radiation therapy, completed 2023.    History of Present Illness (HPI)/Interval History:  Jose presents for problem focused visit. She started to develop an itchy red rash on her right breast x 2 weeks. Was seen by PCP on and given hydrocortisone topical, which she has not used yet. Also a itchy spots on her left hand and bottom of foot. She also endorses recent  "generalized pruritus in the extremities after showering without a rash. No new body soaps/lotions/detergents no other exposures . She does take a claritin about every other day.     She had \"jabby / stabby\" intermittent breast pains in the right breast. Some mild tenderness at her port site after removal - not persistent.     No fevers, chills, chest pain, SOB, headaches, vision changes, N/V/D/C.     Review of Systems:  14-point ROS otherwise negative, as per HPI/Interval History.    Past Medical History:   Diagnosis Date    Anxiety     Bacterial vaginitis 03/21/2023    Breast cancer (Multi)     Triple negative    Herpes simplex vulvovaginitis 03/21/2023    Hx antineoplastic chemo     Personal history of irradiation     Vaginal discharge 03/21/2023    Viral illness 03/21/2023     Patient Active Problem List   Diagnosis    Malignant neoplasm of female breast (Multi)    Right hip pain    Arthralgia    Healthcare maintenance    Anxiety    Encounter for follow-up surveillance of breast cancer    Status post partial mastectomy of right breast    S/P radiation therapy    S/P chemotherapy, time since less than 4 weeks    Family history of breast cancer    Well woman exam with routine gynecological exam    Screening for human papillomavirus (HPV)    HSV infection    History of right breast cancer    Premenstrual symptom    Pain of right hip joint    Tachycardia    Strain of left groin    Sleep disturbance    Premenopausal patient    Mammogram abnormal    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 (Multi)    Infusion reaction    Allergic reaction to drug    Infiltrating ductal carcinoma of right breast, stage 3 (Multi)    Hypotension    Hypertension    History of irregular heartbeat    Generalized hyperhidrosis    Cognitive communication deficit    Chest tightness    Chemotherapy-induced nausea    Chemotherapy-induced fatigue    Cellulitis of chest wall    Breast mass in female    Abdominal bloating    " Routine adult health maintenance    Polyarthralgia    Dermatitis    Weight gain        Past Surgical History:   Procedure Laterality Date    BREAST LUMPECTOMY Right        Social History     Socioeconomic History    Marital status:      Spouse name: Not on file    Number of children: Not on file    Years of education: Not on file    Highest education level: Not on file   Occupational History    Not on file   Tobacco Use    Smoking status: Never     Passive exposure: Never    Smokeless tobacco: Never   Vaping Use    Vaping status: Never Used   Substance and Sexual Activity    Alcohol use: Never    Drug use: Never    Sexual activity: Yes     Partners: Male     Birth control/protection: I.U.D.   Other Topics Concern    Not on file   Social History Narrative    Not on file     Social Determinants of Health     Financial Resource Strain: Not on file   Food Insecurity: No Food Insecurity (11/15/2023)    Hunger Vital Sign     Worried About Running Out of Food in the Last Year: Never true     Ran Out of Food in the Last Year: Never true   Transportation Needs: Not on file   Physical Activity: Not on file   Stress: Not on file   Social Connections: Not on file   Intimate Partner Violence: Not on file   Housing Stability: Not on file       Family History   Problem Relation Name Age of Onset    Breast cancer Mother  50    Diabetes Father      Prostate cancer Father      Breast cancer Maternal Grandmother  65    Diabetes Maternal Grandmother         Allergies:   Allergies   Allergen Reactions    Sulfamethoxazole Hives         Current Outpatient Medications:     copper (Paragard) 380 square mm IUD, 1 each by intrauterine route 1 time. Inserted about around 2020, Disp: , Rfl:     EPINEPHrine 0.3 mg/0.3 mL injection syringe, use as directed, Disp: , Rfl:     hydrocortisone 2.5 % cream, Apply topically 2 times a day as needed for irritation or rash., Disp: 30 g, Rfl: 2    loratadine (Claritin) 10 mg tablet, Take 1 tablet (10  mg) by mouth once daily., Disp: , Rfl:      Objective    BSA: There is no height or weight on file to calculate BSA.  There were no vitals taken for this visit.    Performance Status:  The ECOG performance scale today is ECO- Fully active, able to carry on all pre-disease performance w/o restriction.     Physical Exam  Constitutional:       General: She is not in acute distress.     Appearance: Normal appearance. She is not ill-appearing.   HENT:      Head: Normocephalic and atraumatic.      Mouth/Throat:      Mouth: Mucous membranes are moist.      Pharynx: Oropharynx is clear. No oropharyngeal exudate.   Eyes:      General: No scleral icterus.     Conjunctiva/sclera: Conjunctivae normal.   Cardiovascular:      Rate and Rhythm: Normal rate and regular rhythm.      Heart sounds: No murmur heard.  Pulmonary:      Effort: Pulmonary effort is normal. No respiratory distress.      Breath sounds: Normal breath sounds.   Chest:   Breasts:     Right: Skin change (erythematous macular rash across medial superior portion of breast) present. No mass.      Comments:     Musculoskeletal:         General: No swelling, tenderness or deformity. Normal range of motion.      Cervical back: Normal range of motion and neck supple. No rigidity.   Lymphadenopathy:      Cervical: No cervical adenopathy.   Skin:     General: Skin is warm and dry.      Coloration: Skin is not jaundiced.      Findings: Rash (quartar sized erythematous macule on left hand and foot) present.   Neurological:      General: No focal deficit present.      Mental Status: She is alert and oriented to person, place, and time.      Cranial Nerves: No cranial nerve deficit.      Gait: Gait normal.   Psychiatric:         Mood and Affect: Mood normal.         Behavior: Behavior normal.         Thought Content: Thought content normal.         Judgment: Judgment normal.         Laboratory Data:  Lab Results   Component Value Date    WBC 4.1 (L) 2024    HGB 12.7  07/02/2024    HCT 39.3 07/02/2024    MCV 94 07/02/2024     07/02/2024       Chemistry    Lab Results   Component Value Date/Time     07/02/2024 1025    K 3.9 07/02/2024 1025     07/02/2024 1025    CO2 30 07/02/2024 1025    BUN 9 07/02/2024 1025    CREATININE 0.67 07/02/2024 1025    Lab Results   Component Value Date/Time    CALCIUM 9.0 07/02/2024 1025    ALKPHOS 86 07/02/2024 1025    AST 17 07/02/2024 1025    ALT 15 07/02/2024 1025    BILITOT 0.4 07/02/2024 1025                Assessment/Plan:  Jose Honeycutt is a 44 y.o. female with a history of right triple-negative breast cancer, who presents today for problem focused visit with a new pruritic rash on the right breast.      Malignant neoplasm of female breast (TNBC).  - No new concerning symptoms or examination findings today.    Suspect the rash may be allergy related - would like her to use topical hydrocortisone cream on the right breast, hand and foot lesions ~ BID x 7 days. Along with a nightly benadryl. Discussed switching Claritin to zyrtec or allegra as tolerance can be built up. Would like her to call if rash worsens or does not start to resolve. If little to no symptom resolution consider allergy testing vs. Dermatology referral     - Encouraged breast massage for stabbing pain and massage at prior port site as well     Grade 1 irAE - inflammatory arthritis- resolved.  No further pembrolizumab given.    Disposition.  - RTC 10/2024 ([place] location), will need rescheduled with new oncologist or Ema Fernando PA-C   - She has our contact information and was instructed to call with concerns/questions in the interim.    No orders of the defined types were placed in this encounter.    Ema Fernando PA-C  Hematology and Oncology

## 2024-07-05 ENCOUNTER — OFFICE VISIT (OUTPATIENT)
Dept: HEMATOLOGY/ONCOLOGY | Facility: HOSPITAL | Age: 44
End: 2024-07-05
Payer: COMMERCIAL

## 2024-07-05 VITALS
BODY MASS INDEX: 25.23 KG/M2 | DIASTOLIC BLOOD PRESSURE: 87 MMHG | SYSTOLIC BLOOD PRESSURE: 131 MMHG | OXYGEN SATURATION: 98 % | HEART RATE: 82 BPM | WEIGHT: 156.31 LBS | TEMPERATURE: 97.7 F | RESPIRATION RATE: 18 BRPM

## 2024-07-05 DIAGNOSIS — C50.919 MALIGNANT NEOPLASM OF FEMALE BREAST, UNSPECIFIED ESTROGEN RECEPTOR STATUS, UNSPECIFIED LATERALITY, UNSPECIFIED SITE OF BREAST (MULTI): ICD-10-CM

## 2024-07-05 LAB — ANA SER QL HEP2 SUBST: NEGATIVE

## 2024-07-05 PROCEDURE — 1036F TOBACCO NON-USER: CPT

## 2024-07-05 PROCEDURE — 3075F SYST BP GE 130 - 139MM HG: CPT

## 2024-07-05 PROCEDURE — 99214 OFFICE O/P EST MOD 30 MIN: CPT

## 2024-07-05 PROCEDURE — 3079F DIAST BP 80-89 MM HG: CPT

## 2024-07-05 ASSESSMENT — ENCOUNTER SYMPTOMS
DEPRESSION: 0
OCCASIONAL FEELINGS OF UNSTEADINESS: 0
LOSS OF SENSATION IN FEET: 0

## 2024-07-05 ASSESSMENT — PAIN SCALES - GENERAL: PAINLEVEL: 0-NO PAIN

## 2024-07-09 ENCOUNTER — TELEPHONE (OUTPATIENT)
Dept: PRIMARY CARE | Facility: CLINIC | Age: 44
End: 2024-07-09
Payer: COMMERCIAL

## 2024-07-09 NOTE — TELEPHONE ENCOUNTER
Result Communication    Resulted Orders   Lipid Panel   Result Value Ref Range    Cholesterol 181 0 - 199 mg/dL      Comment:            Age      Desirable   Borderline High   High     0-19 Y     0 - 169       170 - 199     >/= 200    20-24 Y     0 - 189       190 - 224     >/= 225         >24 Y     0 - 199       200 - 239     >/= 240   **All ranges are based on fasting samples. Specific   therapeutic targets will vary based on patient-specific   cardiac risk.    Pediatric guidelines reference:Pediatrics 2011, 128(S5).Adult guidelines reference: NCEP ATPIII Guidelines,CARLOS 2001, 258:2486-97    Venipuncture immediately after or during the administration of Metamizole may lead to falsely low results. Testing should be performed immediately prior to Metamizole dosing.    HDL-Cholesterol 46.2 mg/dL      Comment:        Age       Very Low   Low     Normal    High    0-19 Y    < 35      < 40     40-45     ----  20-24 Y    ----     < 40      >45      ----        >24 Y      ----     < 40     40-60      >60      Cholesterol/HDL Ratio 3.9       Comment:        Ref Values  Desirable  < 3.4  High Risk  > 5.0    LDL Calculated 91 <=99 mg/dL      Comment:                                  Near   Borderline      AGE      Desirable  Optimal    High     High     Very High     0-19 Y     0 - 109     ---    110-129   >/= 130     ----    20-24 Y     0 - 119     ---    120-159   >/= 160     ----      >24 Y     0 -  99   100-129  130-159   160-189     >/=190      VLDL 44 (H) 0 - 40 mg/dL    Triglycerides 220 (H) 0 - 149 mg/dL      Comment:         Age         Desirable   Borderline High   High     Very High   0 D-90 D    19 - 174         ----         ----        ----  91 D- 9 Y     0 -  74        75 -  99     >/= 100      ----    10-19 Y     0 -  89        90 - 129     >/= 130      ----    20-24 Y     0 - 114       115 - 149     >/= 150      ----         >24 Y     0 - 149       150 - 199    200- 499    >/= 500    Venipuncture immediately  after or during the administration of Metamizole may lead to falsely low results. Testing should be performed immediately prior to Metamizole dosing.    Non HDL Cholesterol 135 0 - 149 mg/dL      Comment:            Age       Desirable   Borderline High   High     Very High     0-19 Y     0 - 119       120 - 144     >/= 145    >/= 160    20-24 Y     0 - 149       150 - 189     >/= 190      ----         >24 Y    30 mg/dL above LDL Cholesterol goal     Rheumatoid factor   Result Value Ref Range    Rheumatoid Factor <10 0 - 15 IU/mL   JULES with Reflex to ASHVIN   Result Value Ref Range    JULES Negative Negative      Comment:      The Antinuclear Antibody (JULES) test was performed using  indirect immunofluorescence assay with HEp-2 cells slide.   Hemoglobin A1C   Result Value Ref Range    Hemoglobin A1C 5.0 see below %    Estimated Average Glucose 97 Not Established mg/dL    Narrative    Diagnosis of Diabetes-Adults  Non-Diabetic: < or = 5.6%  Increased risk for developing diabetes: 5.7-6.4%  Diagnostic of diabetes: > or = 6.5%           12:44 PM      Results were successfully communicated with the patient and they acknowledged their understanding.

## 2024-07-09 NOTE — TELEPHONE ENCOUNTER
----- Message from JACKELYN Verduzco sent at 7/8/2024  8:14 AM EDT -----  Labs reviewed and found to be relatively stable. Patient to start daily vitamin D supplement

## 2024-07-12 ENCOUNTER — HOSPITAL ENCOUNTER (OUTPATIENT)
Dept: RADIATION ONCOLOGY | Facility: CLINIC | Age: 44
Setting detail: RADIATION/ONCOLOGY SERIES
Discharge: HOME | End: 2024-07-12
Payer: COMMERCIAL

## 2024-07-12 VITALS
WEIGHT: 156.42 LBS | DIASTOLIC BLOOD PRESSURE: 87 MMHG | HEART RATE: 85 BPM | RESPIRATION RATE: 16 BRPM | SYSTOLIC BLOOD PRESSURE: 138 MMHG | OXYGEN SATURATION: 98 % | BODY MASS INDEX: 25.25 KG/M2 | TEMPERATURE: 96.6 F

## 2024-07-12 DIAGNOSIS — L30.9 DERMATITIS: ICD-10-CM

## 2024-07-12 DIAGNOSIS — C50.511 MALIGNANT NEOPLASM OF LOWER-OUTER QUADRANT OF RIGHT FEMALE BREAST, UNSPECIFIED ESTROGEN RECEPTOR STATUS (MULTI): Primary | ICD-10-CM

## 2024-07-12 PROCEDURE — 99214 OFFICE O/P EST MOD 30 MIN: CPT | Performed by: STUDENT IN AN ORGANIZED HEALTH CARE EDUCATION/TRAINING PROGRAM

## 2024-07-12 RX ORDER — TRIAMCINOLONE ACETONIDE 1 MG/ML
LOTION TOPICAL 2 TIMES DAILY
Qty: 60 ML | Refills: 2 | Status: SHIPPED | OUTPATIENT
Start: 2024-07-12

## 2024-07-12 SDOH — ECONOMIC STABILITY: INCOME INSECURITY: IN THE LAST 12 MONTHS, WAS THERE A TIME WHEN YOU WERE NOT ABLE TO PAY THE MORTGAGE OR RENT ON TIME?: NO

## 2024-07-12 SDOH — ECONOMIC STABILITY: TRANSPORTATION INSECURITY
IN THE PAST 12 MONTHS, HAS THE LACK OF TRANSPORTATION KEPT YOU FROM MEDICAL APPOINTMENTS OR FROM GETTING MEDICATIONS?: NO

## 2024-07-12 SDOH — ECONOMIC STABILITY: TRANSPORTATION INSECURITY
IN THE PAST 12 MONTHS, HAS LACK OF TRANSPORTATION KEPT YOU FROM MEETINGS, WORK, OR FROM GETTING THINGS NEEDED FOR DAILY LIVING?: NO

## 2024-07-12 ASSESSMENT — LIFESTYLE VARIABLES
HOW OFTEN DO YOU HAVE A DRINK CONTAINING ALCOHOL: NEVER
HOW MANY STANDARD DRINKS CONTAINING ALCOHOL DO YOU HAVE ON A TYPICAL DAY: PATIENT DOES NOT DRINK
HOW OFTEN DO YOU HAVE SIX OR MORE DRINKS ON ONE OCCASION: NEVER
AUDIT-C TOTAL SCORE: 0
SKIP TO QUESTIONS 9-10: 1

## 2024-07-12 ASSESSMENT — ENCOUNTER SYMPTOMS
LOSS OF SENSATION IN FEET: 0
SHORTNESS OF BREATH: 0
FATIGUE: 0
DEPRESSION: 0
OCCASIONAL FEELINGS OF UNSTEADINESS: 0
FEVER: 0
TROUBLE SWALLOWING: 0
LEG SWELLING: 0
CHILLS: 0
UNEXPECTED WEIGHT CHANGE: 0
BACK PAIN: 0
HEADACHES: 0

## 2024-07-12 ASSESSMENT — SOCIAL DETERMINANTS OF HEALTH (SDOH)
WITHIN THE LAST YEAR, HAVE YOU BEEN AFRAID OF YOUR PARTNER OR EX-PARTNER?: NO
WITHIN THE LAST YEAR, HAVE TO BEEN RAPED OR FORCED TO HAVE ANY KIND OF SEXUAL ACTIVITY BY YOUR PARTNER OR EX-PARTNER?: NO
WITHIN THE LAST YEAR, HAVE YOU BEEN HUMILIATED OR EMOTIONALLY ABUSED IN OTHER WAYS BY YOUR PARTNER OR EX-PARTNER?: NO
WITHIN THE LAST YEAR, HAVE YOU BEEN KICKED, HIT, SLAPPED, OR OTHERWISE PHYSICALLY HURT BY YOUR PARTNER OR EX-PARTNER?: NO
HOW HARD IS IT FOR YOU TO PAY FOR THE VERY BASICS LIKE FOOD, HOUSING, MEDICAL CARE, AND HEATING?: NOT HARD AT ALL

## 2024-07-12 ASSESSMENT — COLUMBIA-SUICIDE SEVERITY RATING SCALE - C-SSRS
6. HAVE YOU EVER DONE ANYTHING, STARTED TO DO ANYTHING, OR PREPARED TO DO ANYTHING TO END YOUR LIFE?: NO
2. HAVE YOU ACTUALLY HAD ANY THOUGHTS OF KILLING YOURSELF?: NO
1. IN THE PAST MONTH, HAVE YOU WISHED YOU WERE DEAD OR WISHED YOU COULD GO TO SLEEP AND NOT WAKE UP?: NO

## 2024-07-12 ASSESSMENT — PATIENT HEALTH QUESTIONNAIRE - PHQ9
2. FEELING DOWN, DEPRESSED OR HOPELESS: NOT AT ALL
SUM OF ALL RESPONSES TO PHQ9 QUESTIONS 1 AND 2: 0
1. LITTLE INTEREST OR PLEASURE IN DOING THINGS: NOT AT ALL

## 2024-07-12 ASSESSMENT — PAIN SCALES - GENERAL: PAINLEVEL: 0-NO PAIN

## 2024-07-12 NOTE — PROGRESS NOTES
Radiation Oncology Nursing Note    Pain: The patient's current pain level was assessed.  They report currently having a pain of 0 out of 10.  They feel their pain is under control without the use of pain medications.    Review of Systems:  Review of Systems - Oncology

## 2024-07-12 NOTE — PROGRESS NOTES
Radiation Oncology Follow-Up    Patient Name:  Jose Honeycutt  MRN:  [id number]  :  1980    Referring Provider: Mango Hernandez MD  Primary Care Provider: JACKELYN Verduzco  Care Team: Patient Care Team:  JCAKELYN Verduzco as PCP - General (Internal Medicine)  Joseph Fuchs MD as Consulting Physician (Hematology and Oncology)    Date of Service: 2024    SUBJECTIVE  History of Present Illness:  Jose Honeycutt is a 44 y.o. female who was previously seen at the [facility] Department of Radiation Oncology for node positive, triple negative breast cancer of the right breast.       #) Right breast IDC, cT1c cN1, ypT0 ypN0 (sn), grade 3, ER negative, IA negative, HER2 negative (1+), s/p neoadjuvant chemo-immunotherapy per Keynote-522 and right partial mastectomy and SLN biopsy, status post radiation therapy to the right breast and regional nodes, adjuvant pembrolizumab     Ms. Honeycutt is a premenopausal female that presented with a history of right breast palpable lump during her menstrual cycle that came and went away.  The patient noted it returned in November-2022.  The patient denies any discharge from the nipple, skin changes or lumps in the axilla.  The patient has a history of screening mammograms with a negative mammogram in .  The patient had mammogram on January 10, 2023 which showed a 7 mm right upper outer quadrant at anterior depth mammary lymph node and a 12 mm right outer mid depth asymmetry not previously seen.  The patient had ultrasound of the right breast and axilla on 2023 which revealed a 1.1 cm hypoechoic mass at 8:00, 6 cm from the nipple and additional 5 mm hypoechoic mass at 9:00, 6 cm from the nipple likely representing the lymph node.  The patient had right breast mass and lymph node biopsy on 2023 which revealed invasive ductal carcinoma, grade 3, triple negative with involvement of the right intramammary lymph node  with ductal carcinoma.     The patient met with medical oncology and initiated neoadjuvant chemo-immunotherapy completing 4 cycles of carboplatin/paclitaxel/pembrolizumab from February through May 2023 with infusion reaction leading to initiation of Abraxane and held treatment for possible reaction during week 3.  The patient then completed doxorubicin/cyclophosphamide/pembrolizumab for 4 cycles from late May through July 2023.  Post neoadjuvant treatment right mammogram and ultrasound on 7/27/2023 revealed interval decrease in the size of the lesion of the lateral right breast at mid to posterior depth and decrease in the size of the biopsied intramammary lymph node now measuring 3 mm.     The patient underwent right partial mastectomy and sentinel lymph node biopsy on 8/25/2023 which showed complete pathological response to neoadjuvant therapy, ypT0 ypN0 (sn), with 4 lymph nodes removed by dual tracer with previously noted biopsy site changes.  She completed right whole breast radiation therapy and [place] irradiation 4256 cGy in 16 fraction with right tumor bed boost 1000 cGy in 4 fractions on 12/12/2023. She underwent adjuvant pembrolizumab from 10/19/2023-12/28/2023.    7/12/2024: The patient had bilateral diagnostic mammogram with tomosynthesis on 3/12/2024 which showed postsurgical and radiation changes to the right breast with no evidence of new suspicious masses or calcifications in either breast, consistent with BI-RADS 2 findings.  The patient has no issues with range of motion, arm swelling, shortness of breath or chest pain.  The patient has noted erythematous, patchy rash of the right breast in the upper quadrants which has since improved starting hydrocortisone 1% cream.    G 6 P 2  Onset of menses - 13   Onset of menopause -premenopausal, May 2023  Post-menopausal bleeding - N/A  OCP use: 10 years  Estrogen replacement: Denies    Labs:  None     Pathology:  8/25/2023 - A.  RIGHT BREAST  LUMPECTOMY (LONG STITCH LATERAL, SHORT STITCH SUPERIOR):-- NO RESIDUAL CARCINOMA PRESENT AFTER NEOADJUVANT CHEMOTHERAPY.-- ONE LYMPH NODE, NEGATIVE FOR CARCINOMA (0/1).-- PREVIOUS BIOPSY SITE CHANGES, TWO SITES.  B.  ADDITIONAL SUPERIOR BREAST TISSUE (R):-- NEGATIVE FOR CARCINOMA.  C.  RIGHT SENTINEL LYMPH NODE # 1 (COUNT = 250):-- ONE LYMPH NODE, NEGATIVE FOR CARCINOMA (0/1).  D.  ADDITIONAL LYMPH NODE:-- TWO LYMPH NODES, NEGATIVE FOR CARCINOMA (0/2).  ypT0 ypN0 (sn)     1/19/2023 - A.  RIGHT BREAST MASS 8 O'CLOCK, CORE NEEDLE BIOPSY: -- INVASIVE DUCTAL CARCINOMA, GRADE 3, measures up to 0.4 cm in greatest dimension.  ER negative, UT negative, HER2 negative (1+). B.  RIGHT BREAST MASS 9 O'CLOCK, CORE NEEDLE BIOPSY:--  LYMPH NODE INVOLVED BY DUCTAL CARCINOMA.    Disease Associated Genomics:  2/2023 - Genetic work up negative      Imaging:  3/12/2024-bilateral diagnostic mammogram with tomosynthesis: Heterogeneous dense breast, central lateral right breast surgical clips and scarring was noted at the lumpectomy site.  Skin and trabecular thickening of the right breast consistent with radiation therapy.  Scar noted in the right axilla no evidence of suspicious masses or calcifications.  (BI-RADS 2)    7/27/2023 - Diagnostic right mammogram and US: There has been interval decrease in size of both the biopsy-proven malignant mass in the central lateral right breast at mid to posterior depth, as well as the biopsy-proven malignant intramammary lymph node. At 8 o'clock 6 cm from the nipple, a tissue marker is seen. It is difficult to differentiate what represents tissue marker versus any residual mass. At 9 o'clock 6 cm from the nipple, the biopsied intramammary lymph node is seen, which measures 0.3 x 0.3 x 0.2 cm, previously measuring 0.5 x 0.3 x 0.5 cm. The tissue marker is seen adjacent to the intramammary lymph node.     1/12/2023 - US of the right breast and axilla: Right breast at 8 o'clock position, 6 cm from the  nipple hypoechoic mass demonstrating lobulated margins. This mass measures approximately 1.1 x 0.9 x 1.1 cm. Right breast at 9 o'clock position 6 cm from the nipple hypoechoic mass measuring 0.5 x 0.3 x 0.5 cm. This demonstrates fatty hilum with vascularity. This likely represents an intramammary lymph node and corresponds to the mammographic abnormality in the superolateral right breast with cortical thickening. Extensive scanning of the right axillary region does not demonstrate suspicious lymph nodes.     1/10/2023 -screening mammogram with tomosynthesis: A 7 x 5 mm right upper outer quadrant anterior to mid depth intramammary lymph node is slightly larger than in November 2021. A 12 mm equal density right outer mid depth focal asymmetry with circumscribed and obscured or indistinct borders was not previously apparent.     11/1/2021 - screening mammogram with tomosynthesis: The breast tissue is heterogeneously dense, which may obscure small masses.   No suspicious masses or calcifications are identified. (BI-RADS 1)     Prior Systemic Therapies:  Keynote-522  10/19/2023-12/28/2023: Pembro q3 weeks, elected to stop due to reaction  5/18/2023 -7/20/2023: Doxorubicin/cyclophosphamide/pembrolizumab for 4 cycles  2/16/2023-5/4/2023: Carboplatin/paclitaxel/pembrolizumab for 4 cycles  Grade 3 infusion reaction to paclitaxel - Switched to Abraxane/carboplatin for week #2. No issues. - Had possible reaction versus anxiety attack during week #3. Treatment was held.    Prior Surgeries:  8/25/2023: right breast partial mastectomy and sentinel lymph node biopsy    Prior Radiation Therapy:  11/13/2023-12/12/2023: right whole breast radiation therapy and [place] irradiation 4256 cGy in 16 fraction with right tumor bed boost 1000 cGy in 4 fractions    Treatment Rendered:   3D CRT: Right Breast with lymph nodes    Treatment Period Technique Fraction Dose Fractions Total Dose   Course 1 11/13/2023-12/12/2023  (days elapsed:  29)         Rt breast 11/13/2023-12/5/2023 VMAT 266 / 266 cGy 16 / 16 4256 / 4,256 cGy         Rt TB boost 12/6/2023-12/12/2023 3-Field Boost 250 / 250 cGy 4 / 4 1000 / 1,000 cGy       Review of Systems:   Review of Systems   Constitutional:  Negative for chills, fatigue, fever and unexpected weight change.   HENT:   Negative for trouble swallowing.    Respiratory:  Negative for shortness of breath.    Cardiovascular:  Negative for chest pain and leg swelling.   Musculoskeletal:  Negative for back pain.   Skin:  Positive for rash (Right breast rash).   Neurological:  Negative for headaches.       Performance Status:   The Karnofsky performance scale today is 90, Able to carry on normal activity; minor signs or symptoms of disease (ECOG equivalent 0).       OBJECTIVE  Vital Signs:  /87 (BP Location: Left arm, Patient Position: Sitting, BP Cuff Size: Adult long)   Pulse 85   Temp 35.9 °C (96.6 °F) (Temporal)   Resp 16   Wt 70.9 kg (156 lb 6.7 oz)   SpO2 98%   BMI 25.25 kg/m²   Physical Exam  Vitals and nursing note reviewed. Exam conducted with a chaperone present.   HENT:      Head: Normocephalic.   Eyes:      Extraocular Movements: Extraocular movements intact.   Cardiovascular:      Rate and Rhythm: Normal rate and regular rhythm.   Pulmonary:      Effort: Pulmonary effort is normal.   Chest:   Breasts:     Right: Skin change (Noted right lumpectomy and sentinel lymph node scars.  Urticarial erythematous patch rash of the upper quadrants of the right breast.  Noted periareolar skin thickening consistent with radiation changes.) present. No swelling, mass or tenderness.      Left: Normal. No swelling, mass, skin change or tenderness.       Musculoskeletal:         General: Normal range of motion.      Right upper arm: No edema.      Left upper arm: No edema.      Right forearm: No edema.      Left forearm: No edema.      Right lower leg: No edema.      Left lower leg: No edema.   Lymphadenopathy:       Upper Body:      Right upper body: No supraclavicular, axillary or pectoral adenopathy.      Left upper body: No supraclavicular, axillary or pectoral adenopathy.   Neurological:      Mental Status: She is alert.            ASSESSMENT:   Jose Honeycutt is a 44 y.o. female with Malignant neoplasm of female breast (Multi), Clinical: Stage IIB (cT1c, cN1(f), cM0, G3, ER-, MA-, HER2-)  Malignant neoplasm of female breast (Multi), Pathologic: No Stage Recommended (ypT0, pN0(sn), cM0, G3, ER-, MA-, HER2-).      Ms. Honeycutt is a premenopausal female with right breast IDC, cT1c cN1, ypT0 ypN0 (sn), grade  3, ER negative, MA negative, HER2 negative (1+), s/p neoadjuvant chemo-immunotherapy per Keynote-522 and right partial mastectomy and SLN biopsy, status post right whole breast radiation therapy and [place] irradiation 4256 cGy in 16 fraction with right tumor bed boost 1000 cGy in 4 fractions and status post adjuvant pembro, finished early due to side effects.    The patient has been doing well since completion of therapy.  The patient's most recent mammogram showed BI-RADS 2 disease with recommended follow-up in March 2025.  On breast exam the patient has no obvious masses or concern for disease recurrence; however, the patient has improvement in her itchy erythematous patch like rash on her right breast which in the radiation field could be related to delayed dermatological manifestation of radiation therapy or immunotherapy versus other etiology.  Symptoms has improved on hydrocortisone 1% but the rash is still present.  The patient will be prescribed a course of triamcinolone 0.1% lotion to apply to the right breast rash.  I discussed if the rash persists or worsens, the patient may schedule for a skin check and referral to dermatology may be warranted.    I will see the patient back in September 2025 for examination and long-term toxicity assessment.    PLAN:   # Right breast IDC, cT1c cN1, ypT0 ypN0 (sn), grade  3, ER negative, RI negative, HER2 negative (1+), s/p neoadjuvant chemo-immunotherapy per Keynote-522 and right partial mastectomy and SLN biopsy with complete pathological response, status post right whole breast radiation therapy and [place] irradiation 4256 cGy in 16 fraction with right tumor bed boost 1000 cGy in 4 fractions , status post adjuvant pembrolizumab, finished partial course due to side effects  -Follow-up in 9/2025  -Follows with breast surgery, most recent mammogram BI-RADS 2 recommend 1 year follow-up, mammogram schedule 3/2025  -Following with medical oncology status post adjuvant pembro  -s/p right whole breast radiation therapy and [place] irradiation 4256 cGy in 16 fraction with right tumor bed boost 1000 cGy in 4 fractions  -s/p right breast partial mastectomy and sentinel lymph node biopsy with pCR  -s/p Neoadjuvant chemothereapy     #) Acute toxicities:   -Dermatitis: erythematous patch of left breast using hydrocortisone, prescribing triamcinolone lotion    #) Long term toxicities:   -Skin thickening: Mild periareolar skin thickening    A total of 20 minutes were spent face-to-face with the patient, with an additional 10 minutes spent reviewing records including imaging, pathology and physician notes.    Mango Hernandez MD  [facility] [place]  [facility]  - Department of Radiation Oncology  Phone: [phone]  Fax: [phone]  [facility] secure chat preferred / Pager [phone]    Note: This was transcribed using Dragon voice recognition software. Attempts were made to correct any errors; however, errors or omissions may be present.     NCCN Guidelines were applicable to guide this patients treatment plan.

## 2024-07-12 NOTE — PROGRESS NOTES
Patient is in today for follow up visit. Her last radiation treatment was 12/12/23. Today she denies pain but does report occasional twinges in the right breast. She states that she had an allergic reaction that caused some pinking and itching to the breast. Hydrocortisone has relieved the itch but skin is still pink. She will see Alonso 8/2, Gomez 9/24, Eugene 10/23, and mammogram 3/3/25. Prior to appointment ending the patient was informed of time frame for next FUV. Verbalized understanding. No further concerns for nursing at this time.

## 2024-09-17 ENCOUNTER — OFFICE VISIT (OUTPATIENT)
Dept: HEMATOLOGY/ONCOLOGY | Facility: HOSPITAL | Age: 44
End: 2024-09-17
Payer: COMMERCIAL

## 2024-09-17 ENCOUNTER — LAB (OUTPATIENT)
Dept: LAB | Facility: HOSPITAL | Age: 44
End: 2024-09-17
Payer: COMMERCIAL

## 2024-09-17 VITALS
DIASTOLIC BLOOD PRESSURE: 90 MMHG | HEART RATE: 72 BPM | OXYGEN SATURATION: 100 % | TEMPERATURE: 97.3 F | WEIGHT: 155.2 LBS | SYSTOLIC BLOOD PRESSURE: 129 MMHG | BODY MASS INDEX: 25.05 KG/M2

## 2024-09-17 DIAGNOSIS — R23.2 HOT FLASHES: ICD-10-CM

## 2024-09-17 DIAGNOSIS — Z17.1 MALIGNANT NEOPLASM OF BREAST IN FEMALE, ESTROGEN RECEPTOR NEGATIVE, UNSPECIFIED LATERALITY, UNSPECIFIED SITE OF BREAST: ICD-10-CM

## 2024-09-17 DIAGNOSIS — C50.919 MALIGNANT NEOPLASM OF BREAST IN FEMALE, ESTROGEN RECEPTOR NEGATIVE, UNSPECIFIED LATERALITY, UNSPECIFIED SITE OF BREAST: Primary | ICD-10-CM

## 2024-09-17 DIAGNOSIS — C50.919 MALIGNANT NEOPLASM OF BREAST IN FEMALE, ESTROGEN RECEPTOR NEGATIVE, UNSPECIFIED LATERALITY, UNSPECIFIED SITE OF BREAST: ICD-10-CM

## 2024-09-17 DIAGNOSIS — Z17.1 MALIGNANT NEOPLASM OF BREAST IN FEMALE, ESTROGEN RECEPTOR NEGATIVE, UNSPECIFIED LATERALITY, UNSPECIFIED SITE OF BREAST: Primary | ICD-10-CM

## 2024-09-17 DIAGNOSIS — R21 RASH OF BACK: ICD-10-CM

## 2024-09-17 LAB
ESTRADIOL SERPL-MCNC: <19 PG/ML
FSH SERPL-ACNC: 60.7 IU/L
LH SERPL-ACNC: 43.7 IU/L

## 2024-09-17 PROCEDURE — 99215 OFFICE O/P EST HI 40 MIN: CPT | Performed by: NURSE PRACTITIONER

## 2024-09-17 PROCEDURE — 36415 COLL VENOUS BLD VENIPUNCTURE: CPT

## 2024-09-17 PROCEDURE — 83520 IMMUNOASSAY QUANT NOS NONAB: CPT

## 2024-09-17 PROCEDURE — 83001 ASSAY OF GONADOTROPIN (FSH): CPT

## 2024-09-17 PROCEDURE — 3080F DIAST BP >= 90 MM HG: CPT | Performed by: NURSE PRACTITIONER

## 2024-09-17 PROCEDURE — 99417 PROLNG OP E/M EACH 15 MIN: CPT | Performed by: NURSE PRACTITIONER

## 2024-09-17 PROCEDURE — 82670 ASSAY OF TOTAL ESTRADIOL: CPT

## 2024-09-17 PROCEDURE — 3074F SYST BP LT 130 MM HG: CPT | Performed by: NURSE PRACTITIONER

## 2024-09-17 PROCEDURE — 83002 ASSAY OF GONADOTROPIN (LH): CPT

## 2024-09-17 ASSESSMENT — PAIN SCALES - GENERAL: PAINLEVEL: 0-NO PAIN

## 2024-09-17 NOTE — PROGRESS NOTES
Patient ID: Jose Honeycutt is a 44 y.o. female.  Cancer History:          Breast         AJCC Edition: 8th (AJCC), Diagnosis Date: Jan 2023, IIB, cT1c cN1 cM0 G3   -01/10/23: Bilateral screening mammogram showed a 1.2 cm density in right breast, as well as a 7mm intramammary LN.  -01/12/23: Dx MG/US confirmed a 1.1 x 0.9 x 1.1 cm hypoechoic mass at 8:00, 6 cm FN. At 9:00, 6 cm FN, a 0.5 cm mass noted, possible an intramammary LN. The right axillary LN’s appeared normal.  -01/19/22: US-guided CNB of 8:00 mass showed IDC, grade 3, ER/VA-negative Her2-negative (1+ IHC). 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  -Genetic testing performed, negative for pathogenic mutations.  -02/16/23: C1D1 (pembrolizumab was received, but developed a grade 3 infusion reaction to paclitaxel), switched to Abraxane/carboplatin for week #2. No issues.  -05/04/23: C4D15 completed abraxane/carboplatin  -05/18/23: C5D1 adriamycin/cyclophosphamide + pembrolizumab   -07/20/23: C8D15 completed adriamycin/cyclophosphamide  -08/25/23: R breast partial mastectomy with sential lymph node dissection  -10/19/23: C9D1 pembrolizumab alone   The patient underwent right partial mastectomy and sentinel lymph node biopsy on 8/25/2023 which showed complete pathological response to neoadjuvant therapy, ypT0 ypN0 (sn), with 4 lymph nodes removed by dual tracer with previously noted biopsy site changes.  She completed right whole breast radiation therapy and [place] irradiation 4256 cGy in 16 fraction with right tumor bed boost 1000 cGy in 4 fractions on 12/12/2023. She underwent adjuvant pembrolizumab from 10/19/2023-12/28/2023.     Subjective    Jose Honeycutt is a 43 y.o. F with a history of triple negative breast cancer. She is now s/p jean carlos-adjuvant carboplatin/paclitaxel/pembrolizumab x 4 cycles followed by cyclophosphamide/doxorubicin/pembrolizumab x 4 cycles. Underwent surgery with R partial mastectomy 08/2023,  completed pembrolizumab July, 2024.      Returns in follow up today regarding ovarian function. Relays resumption of menses 12/31/24 - prior to this LMP was 05/2023. First menses was heavy, lasted 9 days, then did not experience a period again until 04/18/24 - monthly x4 and has not experienced a period since 08/06/24. She did have some spotting mid-August, past month had some cramping/andujar felt like she was going to get menses. Is sexually active, has IUD placed. No concerns with vaginal dryness or sexual dysfunction.     Is interested in weight loss management, notes increase in weight over past year. Still continuing to eat well, mainly plant based diet. Does walk daily, does yoga weekly. Desires weight loss.     Previously had complete cessation of hot flashes, recent resumption of hot flashes nightly for the past three weeks, lasts minutes and resolves spontaneously, feels warm/flushed but not sweating, annoying but not bothersome/unmanageable. Was getting acupuncture for hot flashes with relief but has not gone recently since re-exacerbated.     Also with concerns regarding recurrent/unresolved rash. First developed rash 07/2024, macular/papular in nature, erythemic and not raised to R upper chest, pruritic. Was still getting pembrolizumab at that time, started on topical steroids and anti-inflammatory with some relief. Went on vacation and exposure to sun improved/resolved rash - then developed new site on low back not responsive to topical steroids/anti-histamines.    Recent family viral illness, children/spouse sick, patient felt slightly under the weather, scratchy throat/some fatigue that would resolve in mornings. Denies fevers/chills/sweats. Lymph node is not painful. Noted new R peritoneal lymph node enlarged that was worrisome, thinks slightly smaller today but would like evaluated.    Mood has been fair, fluctuant, not impacting function or focus, sleep has been slightly worse, still not using  caffeine, not currently seeing therapist.    Social History: Lives in [place], [place]. No ETOH, recreational drug use, or tobacco. 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       Objective    BSA: There is no height or weight on file to calculate BSA.  There were no vitals taken for this visit.     Physical Exam  Constitutional:       General: She is not in acute distress.     Appearance: Normal appearance. She is normal weight. She is not ill-appearing.   HENT:      Head: Normocephalic.   Genitourinary:     Comments: R peritoneal lymph node <1cm, not painful with palpation, firm but not fixed.  Skin:     General: Skin is warm and dry.      Findings: Rash present. Rash is macular and papular.             Comments: Erythemic, macular/papular raised rash to posterior back, blanchable, confluent. Media uploaded to chart.    Neurological:      General: No focal deficit present.      Mental Status: She is alert and oriented to person, place, and time.   Psychiatric:         Mood and Affect: Mood normal.         Behavior: Behavior normal.         Thought Content: Thought content normal.         Judgment: Judgment normal.     Performance Status:  Asymptomatic    Assessment/Plan   Jose Honeycutt is a 43 y.o. F with a recent history of triple negative breast cancer. S/p jean carlos-adjuvant carboplatin/paclitaxel/pembrolizumab, and adriamycin/cytoxan, underwent R partial mastectomy 08/2023, and now currently on pembrolizumab adjuvant therapy.    #Lymph node palpable: small, <1cm, not fixed or painful, coincides with viral illness  - Monitor for resolution  - If persists, increases in size, becomes painful to notify provider for further evaluation    #Skin rash: persistent with differing presentation today, timeline is not consistent with immune mediated, photographed in media tab  - E-Consult to dermatology for guidance,  discussed with patient she may need formal derm evaluation  - Continue topical hydrocortisone for now, may need higher dosage strength than OTC    #At risk for primary ovarian insufficiency: with amenorrhea post chemotherapy, last chemotherapy 07/2023  - Previously discussed the damaging effect cancer treatment can have on the ovaries.   - Previously discussed natural age related decline in fertility and menopause that occurs as a result of ovarian aging, and that cancer treatments can accelerate that progression and diminish ovarian reserve.  - Individuals may experience varying degrees of short or long term ovarian dysfunction and amenorrhea, and that this is dependent upon type of cancer treatment, cumulative dose, and patients age.   - Loss of ovarian function may be permanent or temporary.  - Amenorrhea may be temporary or permanent -- temporary amenorrhea results from destruction of maturing follicles whereas permanent amenorrhea results from depletion of viable primordial follicles.  - Experienced resumption 04/2024 and then cessation of menses 08/2024  - Repeat ovarian function testing now that she is one year out from chemotherapy, FSH, LH, E2, and AMH    #Hot flashes: recurrent, possibly related to ovarian insufficiency from chemotherapy  - Evaluate ovarian function - pending results may consider referral for HRT  - Continue acupuncture  - Discussed omission of caffeine and other triggering foods    #Sexual dysfunction/contraception:  - Currently using copper IUD  - Not currently experiencing sexual dysfunction    #Diet/Exercise/Healthy Lifestyle:  - Referral to  general dietician services for healthy diet/weight loss management  - Recommended strength/resistance training for weight loss     #Anxiety: baseline anxiety and with added stressors of cancer/treatment   - Managing with exercise/stress management     Follow up phone call with lab results, visit pending results and plan     Cancer Staging    Malignant neoplasm of female breast (Multi)  Staging form: Breast, AJCC 8th Edition  - Clinical stage from 1/19/2023: Stage IIB (cT1c, cN1(f), cM0, G3, ER-, DC-, HER2-) - Signed by Mango Hernandez MD on 10/11/2023  - Pathologic stage from 8/25/2023: No Stage Recommended (ypT0, pN0(sn), cM0, G3, ER-, DC-, HER2-) - Signed by Mango Hernandez MD on 10/11/2023      Oncology History   Malignant neoplasm of female breast (Multi)   1/19/2023 Cancer Staged    Staging form: Breast, AJCC 8th Edition, Clinical stage from 1/19/2023: Stage IIB (cT1c, cN1(f), cM0, G3, ER-, DC-, HER2-) - Signed by Mango Hernandez MD on 10/11/2023     2/16/2023 - 12/28/2023 Chemotherapy    Pembrolizumab + PACLitaxel / CARBOplatin followed by Pembrolizumab + DOXOrubicin / Cyclophosphamide followed by Pembrolizumab, 21 Day Cycles     8/25/2023 Cancer Staged    Staging form: Breast, AJCC 8th Edition, Pathologic stage from 8/25/2023: No Stage Recommended (ypT0, pN0(sn), cM0, G3, ER-, DC-, HER2-) - Signed by Mango Hernandez MD on 10/11/2023     9/26/2023 Initial Diagnosis    Malignant neoplasm of female breast (CMS/HCC)                   Piper Gupta, LEAH-CNP

## 2024-09-18 ENCOUNTER — E-CONSULT (OUTPATIENT)
Dept: DERMATOLOGY | Facility: CLINIC | Age: 44
End: 2024-09-18
Payer: COMMERCIAL

## 2024-09-18 DIAGNOSIS — B36.0 TINEA VERSICOLOR: Primary | ICD-10-CM

## 2024-09-18 PROCEDURE — 99451 NTRPROF PH1/NTRNET/EHR 5/>: CPT | Performed by: DERMATOLOGY

## 2024-09-18 NOTE — PROGRESS NOTES
"E-Consult note:     Summary of data review: Breast cancer IIB diagnosed Jan 2023 initially treated with chemoimmunotherapy then continued on pembrolizumab alone 10/2023. Radiation boost 12/12/23. Completed pembrolizumab 07/2024, no exposures or changes in hygiene routine, new onset macular papular erythemic rash to R superior breast, given topical hydrocortisone breast rash resolved, new rash on low back, not reslvd with hydrocort/antihistamines.    First developed rash 07/2024, macular/papular in nature, erythemic and not raised to R upper chest, pruritic. Was still getting pembrolizumab at that time, started on topical steroids and anti-inflammatory with some relief. Went on vacation and exposure to sun improved/resolved rash - then developed new site on low back not responsive to topical steroids/anti-histamines.     There is one photo of what appears to be the lower back of 5 almost oval-shaped thin plaques, some overlapping, distributed on lower back     Findings / recommendations:   After careful review of the patient's information available in the medical record, the following are my findings and recommendations:     Tinea versicolor partially treated vs. Eczematous dermatitis   - The appearance and history fit most with tinea versicolor. This is a type of rash related to yeast overgrowth on the skin that can worsen with sweating. Topical steroids can temporarily result in improvement but then worsening as the yeast \"rebounds\"  - I would recommend stopping topical steroids and switching to an anti-dandruff shampoo, either Selsun blue OTC or ketoconazle 2% Rx, let sit 5 minutes before rinsing, use to wash daily for 3-4 weeks.   - Rx ketoconazole 2% cream to apply BID can also be sent. Sometimes it is helpful to have both a wash to cover broad areas and a cream to focus on the most problematic spots    If not improving over the next 1-2 months and/or if worsening, would recommend in person derm eval    eConsult " Time: 10 minutes      Jenna Alston MD      9/18/2024

## 2024-09-21 LAB — MIS SERPL-MCNC: <0.003 NG/ML

## 2024-09-24 ENCOUNTER — APPOINTMENT (OUTPATIENT)
Dept: SURGICAL ONCOLOGY | Facility: HOSPITAL | Age: 44
End: 2024-09-24
Payer: COMMERCIAL

## 2024-09-25 DIAGNOSIS — R23.2 HOT FLASHES: ICD-10-CM

## 2024-09-25 DIAGNOSIS — Z17.1 MALIGNANT NEOPLASM OF BREAST IN FEMALE, ESTROGEN RECEPTOR NEGATIVE, UNSPECIFIED LATERALITY, UNSPECIFIED SITE OF BREAST: Primary | ICD-10-CM

## 2024-09-25 DIAGNOSIS — C50.919 MALIGNANT NEOPLASM OF BREAST IN FEMALE, ESTROGEN RECEPTOR NEGATIVE, UNSPECIFIED LATERALITY, UNSPECIFIED SITE OF BREAST: Primary | ICD-10-CM

## 2024-10-08 ENCOUNTER — APPOINTMENT (OUTPATIENT)
Dept: SURGICAL ONCOLOGY | Facility: CLINIC | Age: 44
End: 2024-10-08
Payer: COMMERCIAL

## 2024-10-23 ENCOUNTER — APPOINTMENT (OUTPATIENT)
Dept: HEMATOLOGY/ONCOLOGY | Facility: CLINIC | Age: 44
End: 2024-10-23
Payer: COMMERCIAL

## 2024-10-29 ENCOUNTER — APPOINTMENT (OUTPATIENT)
Dept: SURGICAL ONCOLOGY | Facility: CLINIC | Age: 44
End: 2024-10-29
Payer: COMMERCIAL

## 2024-10-29 DIAGNOSIS — Z85.3 ENCOUNTER FOR FOLLOW-UP SURVEILLANCE OF BREAST CANCER: ICD-10-CM

## 2024-10-29 DIAGNOSIS — C50.919 MALIGNANT NEOPLASM OF FEMALE BREAST, UNSPECIFIED ESTROGEN RECEPTOR STATUS, UNSPECIFIED LATERALITY, UNSPECIFIED SITE OF BREAST: Primary | ICD-10-CM

## 2024-10-29 DIAGNOSIS — Z08 ENCOUNTER FOR FOLLOW-UP SURVEILLANCE OF BREAST CANCER: ICD-10-CM

## 2024-11-25 ENCOUNTER — OFFICE VISIT (OUTPATIENT)
Dept: HEMATOLOGY/ONCOLOGY | Facility: HOSPITAL | Age: 44
End: 2024-11-25
Payer: COMMERCIAL

## 2024-11-25 VITALS
HEART RATE: 86 BPM | SYSTOLIC BLOOD PRESSURE: 142 MMHG | OXYGEN SATURATION: 100 % | WEIGHT: 155.2 LBS | BODY MASS INDEX: 25.05 KG/M2 | TEMPERATURE: 97.9 F | DIASTOLIC BLOOD PRESSURE: 98 MMHG

## 2024-11-25 DIAGNOSIS — Z17.1 MALIGNANT NEOPLASM OF BREAST IN FEMALE, ESTROGEN RECEPTOR NEGATIVE, UNSPECIFIED LATERALITY, UNSPECIFIED SITE OF BREAST: Primary | ICD-10-CM

## 2024-11-25 DIAGNOSIS — C50.919 MALIGNANT NEOPLASM OF BREAST IN FEMALE, ESTROGEN RECEPTOR NEGATIVE, UNSPECIFIED LATERALITY, UNSPECIFIED SITE OF BREAST: Primary | ICD-10-CM

## 2024-11-25 DIAGNOSIS — R21 RASH OF BACK: ICD-10-CM

## 2024-11-25 DIAGNOSIS — R23.2 HOT FLASHES: ICD-10-CM

## 2024-11-25 PROCEDURE — 3077F SYST BP >= 140 MM HG: CPT | Performed by: NURSE PRACTITIONER

## 2024-11-25 PROCEDURE — 99215 OFFICE O/P EST HI 40 MIN: CPT | Performed by: NURSE PRACTITIONER

## 2024-11-25 PROCEDURE — 3080F DIAST BP >= 90 MM HG: CPT | Performed by: NURSE PRACTITIONER

## 2024-11-25 ASSESSMENT — PAIN SCALES - GENERAL: PAINLEVEL_OUTOF10: 0-NO PAIN

## 2024-11-25 NOTE — PATIENT INSTRUCTIONS
Recommend seeing Dr. Jerad Blackman at [facility] for breast oncology in Dr. Knight's absence.

## 2024-11-25 NOTE — PROGRESS NOTES
Patient ID: Jose Honeycutt is a 44 y.o. female.  Cancer History:          Breast         AJCC Edition: 8th (AJCC), Diagnosis Date: Jan 2023, IIB, cT1c cN1 cM0 G3   -01/10/23: Bilateral screening mammogram showed a 1.2 cm density in right breast, as well as a 7mm intramammary LN.  -01/12/23: Dx MG/US confirmed a 1.1 x 0.9 x 1.1 cm hypoechoic mass at 8:00, 6 cm FN. At 9:00, 6 cm FN, a 0.5 cm mass noted, possible an intramammary LN. The right axillary LN’s appeared normal.  -01/19/22: US-guided CNB of 8:00 mass showed IDC, grade 3, ER/UT-negative Her2-negative (1+ IHC). 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  -Genetic testing performed, negative for pathogenic mutations.  -02/16/23: C1D1 (pembrolizumab was received, but developed a grade 3 infusion reaction to paclitaxel), switched to Abraxane/carboplatin for week #2. No issues.  -05/04/23: C4D15 completed abraxane/carboplatin  -05/18/23: C5D1 adriamycin/cyclophosphamide + pembrolizumab   -07/20/23: C8D15 completed adriamycin/cyclophosphamide  -08/25/23: R breast partial mastectomy with sential lymph node dissection  -10/19/23: C9D1 pembrolizumab alone   The patient underwent right partial mastectomy and sentinel lymph node biopsy on 8/25/2023 which showed complete pathological response to neoadjuvant therapy, ypT0 ypN0 (sn), with 4 lymph nodes removed by dual tracer with previously noted biopsy site changes.  She completed right whole breast radiation therapy and [place] irradiation 4256 cGy in 16 fraction with right tumor bed boost 1000 cGy in 4 fractions on 12/12/2023. She underwent adjuvant pembrolizumab from 10/19/2023-12/28/2023.     Subjective    Jose Honeycutt is a 43 y.o. F with a history of triple negative breast cancer. She is now s/p jean carlos-adjuvant carboplatin/paclitaxel/pembrolizumab x 4 cycles followed by cyclophosphamide/doxorubicin/pembrolizumab x 4 cycles. Underwent surgery with R partial mastectomy 08/2023,  completed pembrolizumab July, 2024.      Returns in follow up today regarding hot flashes and db-menopause.     Relays that in October she made this appointment because of severe hot flashes occurring multiple times/day and night that were bothersome and affecting her QOL, these lasted for a week and then subsided.     Has had irregular menses past year - resumption of menses after chemotherapy 12/2023, then 04/2024, monthly x 4 until 08/2024 - recently had menses begin 11/17 - it is light, dark red blood - no fresh bleeding or heavy bleeding, at times not even saturating a panty liner. She did note menses symptoms of bloating, increase in irritability, no pelvic pain.      Is sexually active, has IUD placed. No concerns with vaginal dryness or sexual dysfunction.     Mood has been fair, fluctuant, not impacting function or focus, sleep has been slightly worse, still not using caffeine, not currently seeing therapist.    Continues to have recurrent rash since spring/summer, prior lesion on back resolved with ketoconazole topical shampoo, now new lesions to her breasts along bra-line. Has used shampoo and noted improvement in pruritus associated with lesions but lesions persist.    Social History: Lives in [place], [place]. No ETOH, recreational drug use, or tobacco. 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     Objective    BSA: 1.81 meters squared  BP (!) 142/98 (BP Location: Left arm, Patient Position: Sitting, BP Cuff Size: Adult)   Pulse 86   Temp 36.6 °C (97.9 °F) (Temporal)   Wt 70.4 kg (155 lb 3.2 oz)   SpO2 100%   BMI 25.05 kg/m²      Physical Exam  Constitutional:       General: She is not in acute distress.     Appearance: Normal appearance. She is normal weight. She is not ill-appearing.   HENT:      Head: Normocephalic.   Genitourinary:     Comments: R peritoneal lymph node <1cm, not  painful with palpation, firm but not fixed.  Skin:     General: Skin is warm and dry.      Findings: Rash present. Rash is macular and papular.          Neurological:      General: No focal deficit present.      Mental Status: She is alert and oriented to person, place, and time.   Psychiatric:         Mood and Affect: Mood normal.         Behavior: Behavior normal.         Thought Content: Thought content normal.         Judgment: Judgment normal.     Performance Status:  Asymptomatic    Assessment/Plan   Jose Honeycutt is a 43 y.o. F with a recent history of triple negative breast cancer. S/p jean carlos-adjuvant carboplatin/paclitaxel/pembrolizumab, and adriamycin/cytoxan, underwent R partial mastectomy 08/2023, and now currently on pembrolizumab adjuvant therapy.    #Lymph node palpable: resolved    #Skin rash: persistent with differing presentation today  - Previously e-consulted dermatology, thought to be fungal, patient treated posterior lumbar spine lesion with ketoconazole shampoo with resolution, new lesions to L breast  - Referral to dermatology in person for unresolved rash    #At risk for primary ovarian insufficiency: with amenorrhea post chemotherapy, last chemotherapy 07/2023  - Previously discussed the damaging effect cancer treatment can have on the ovaries.   - Previously discussed natural age related decline in fertility and menopause that occurs as a result of ovarian aging, and that cancer treatments can accelerate that progression and diminish ovarian reserve.  - Individuals may experience varying degrees of short or long term ovarian dysfunction and amenorrhea, and that this is dependent upon type of cancer treatment, cumulative dose, and patients age.   - Loss of ovarian function may be permanent or temporary.  - Discussed that she is experiencing db-menopause/ovarian insufficiency  - Prior labs 09/2024 reflected elevated FSH/LH and undetectable AMH and E2    #Hot flashes: recurrent and fluctuant,  related to db-menopause/ovarian insufficiency  - Discussed evidence based management for hot flashes - SSRI's, gabapentin, fexolinitant, she is not interested in medication management  - We discussed limited evidence for non-pharmacologic strategies such as mind-body therapies and acupuncture  - We discussed lack of any evidence for supplements such as phyto-estrogens, black cohash, evening primrose - these are NOT recommended for hot flashes and there is concern that they may negatively stimulate breast tissue  - Will review literature regarding use of physiologic hormone replacement therapy - concern that this is not well studied/safe in women with a history of triple negative breast cancer  - Encouraged patient to monitor for potential triggers of hot-flashes and avoid    #Sexual dysfunction/contraception:  - Currently using copper IUD  - Not currently experiencing sexual dysfunction    #Diet/Exercise/Healthy Lifestyle:  - Referral to  general dietician services for healthy diet/weight loss management  - Recommended strength/resistance training for weight loss     #Anxiety: baseline anxiety and with added stressors of cancer/treatment   - Managing with exercise/stress management     Will follow up with patient re review of literature re HRT in [facility] for hot flashes/ovarian insufficiency. Follow up visit to trend lab values in 4 months.    The amount of time that I spent with the patient:  Prep: 5  Time spend directly with patient: 25  Coordinating care:  Documentation: 5  Other time: 10 - reviewing literature/contacting colleagues    Total time spent on encounter: 45       Cancer Staging   Malignant neoplasm of female breast  Staging form: Breast, AJCC 8th Edition  - Clinical stage from 1/19/2023: Stage IIB (cT1c, cN1(f), cM0, G3, ER-, HI-, HER2-) - Signed by Mango Hernandez MD on 10/11/2023  - Pathologic stage from 8/25/2023: No Stage Recommended (ypT0, pN0(sn), cM0, G3, ER-, HI-, HER2-) - Signed by Mango RODRIGES  MD Mary on 10/11/2023      Oncology History   Malignant neoplasm of female breast   1/19/2023 Cancer Staged    Staging form: Breast, AJCC 8th Edition, Clinical stage from 1/19/2023: Stage IIB (cT1c, cN1(f), cM0, G3, ER-, ND-, HER2-) - Signed by Mango Hernandez MD on 10/11/2023     2/16/2023 - 12/28/2023 Chemotherapy    Pembrolizumab + PACLitaxel / CARBOplatin followed by Pembrolizumab + DOXOrubicin / Cyclophosphamide followed by Pembrolizumab, 21 Day Cycles     8/25/2023 Cancer Staged    Staging form: Breast, AJCC 8th Edition, Pathologic stage from 8/25/2023: No Stage Recommended (ypT0, pN0(sn), cM0, G3, ER-, ND-, HER2-) - Signed by Mango Hernandez MD on 10/11/2023     9/26/2023 Initial Diagnosis    Malignant neoplasm of female breast (CMS/HCC)                   Piper Gupta, LEAH-CNP

## 2024-12-19 ENCOUNTER — APPOINTMENT (OUTPATIENT)
Dept: HEMATOLOGY/ONCOLOGY | Facility: CLINIC | Age: 44
End: 2024-12-19
Payer: COMMERCIAL

## 2024-12-26 ENCOUNTER — APPOINTMENT (OUTPATIENT)
Dept: HEMATOLOGY/ONCOLOGY | Facility: CLINIC | Age: 44
End: 2024-12-26
Payer: COMMERCIAL

## 2024-12-27 ENCOUNTER — APPOINTMENT (OUTPATIENT)
Dept: PRIMARY CARE | Facility: CLINIC | Age: 44
End: 2024-12-27
Payer: COMMERCIAL

## 2025-01-03 ENCOUNTER — APPOINTMENT (OUTPATIENT)
Dept: PRIMARY CARE | Facility: CLINIC | Age: 45
End: 2025-01-03
Payer: COMMERCIAL

## 2025-01-28 ENCOUNTER — OFFICE VISIT (OUTPATIENT)
Dept: HEMATOLOGY/ONCOLOGY | Facility: CLINIC | Age: 45
End: 2025-01-28
Payer: COMMERCIAL

## 2025-01-28 VITALS
SYSTOLIC BLOOD PRESSURE: 143 MMHG | WEIGHT: 153.11 LBS | HEIGHT: 66 IN | TEMPERATURE: 98 F | OXYGEN SATURATION: 97 % | BODY MASS INDEX: 24.61 KG/M2 | HEART RATE: 89 BPM | DIASTOLIC BLOOD PRESSURE: 104 MMHG

## 2025-01-28 DIAGNOSIS — Z17.1 MALIGNANT NEOPLASM OF BREAST IN FEMALE, ESTROGEN RECEPTOR NEGATIVE, UNSPECIFIED LATERALITY, UNSPECIFIED SITE OF BREAST: Primary | ICD-10-CM

## 2025-01-28 DIAGNOSIS — C50.919 MALIGNANT NEOPLASM OF BREAST IN FEMALE, ESTROGEN RECEPTOR NEGATIVE, UNSPECIFIED LATERALITY, UNSPECIFIED SITE OF BREAST: Primary | ICD-10-CM

## 2025-01-28 PROCEDURE — 3080F DIAST BP >= 90 MM HG: CPT | Performed by: STUDENT IN AN ORGANIZED HEALTH CARE EDUCATION/TRAINING PROGRAM

## 2025-01-28 PROCEDURE — 99215 OFFICE O/P EST HI 40 MIN: CPT | Performed by: STUDENT IN AN ORGANIZED HEALTH CARE EDUCATION/TRAINING PROGRAM

## 2025-01-28 PROCEDURE — 1036F TOBACCO NON-USER: CPT | Performed by: STUDENT IN AN ORGANIZED HEALTH CARE EDUCATION/TRAINING PROGRAM

## 2025-01-28 PROCEDURE — 3077F SYST BP >= 140 MM HG: CPT | Performed by: STUDENT IN AN ORGANIZED HEALTH CARE EDUCATION/TRAINING PROGRAM

## 2025-01-28 PROCEDURE — 3008F BODY MASS INDEX DOCD: CPT | Performed by: STUDENT IN AN ORGANIZED HEALTH CARE EDUCATION/TRAINING PROGRAM

## 2025-01-28 ASSESSMENT — PAIN SCALES - GENERAL: PAINLEVEL_OUTOF10: 3

## 2025-01-28 NOTE — PROGRESS NOTES
Breast Medical Oncology Clinic  Location: [facility]    Visit Type: New Patient Visit    Oncologic History:    -1/10/23: Bilateral screening mammogram showed a 1.2 cm density in right breast, as well as a 7mm intramammary LN.  -1/12/23: Dx MG/US confirmed a 1.1 x 0.9 x 1.1 cm hypoechoic mass at 8:00, 6 cm FN. At 9:00, 6 cm FN, a 0.5 cm mass noted, possible an intramammary LN. The right axillary LN’s appeared normal.  -1/19/22: US-guided CNB of 8:00 mass showed IDC, grade 3, ER/RI-negative Her2-negative (1+ IHC). 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  -Genetic testing performed, negative for pathogenic mutations.  -Neoadjuvant chemoimmunotherapy (KEYNOTE-522 regimen) initiated 2/16/23: Pembrolizumab with carboplatin/paclitaxel, followed by doxorubicin/cyclophosphamide. Completed 7/20/23.  -8/25/23: Right lumpectomy/SLNBx performed. No residual carcinoma noted. 0/4 LN's (one sentinel node) involved. ypT0 ypN0.  - Adjuvant Pembrolizumab -10/19/23 - 12/28/2023 (completed 4 cycles adjuvant setting, discontinued for patient preference and development of arthritis).  - Radiation therapy, completed 12/2023.    Subjective: History of Present Illness    Patient presents today for follow-up new to doctor visit.    Oncologic history reviewed above with patient.     Denies interval events since last follow-up- no recent hospitalizations, new medical diagnoses, or new medications.     No major residual symptoms from prior treatments.     Reports a few months ago developed pain in the R breast inframammary fold along lateral edge. Concerned about this new symptom.     Menses have returned.     Denies weight loss, new aches or pains, changes in appetite or energy level.       Social History  Jose Honeycutt  reports that she has never smoked. She has never been exposed to tobacco smoke. She has never used smokeless tobacco.  She  reports no history of alcohol use.  She  reports no history of  "drug use.    ROS:     Review of Systems   All other systems reviewed and are negative.       Physical Examination:    BP (!) 143/104 (BP Location: Right arm, Patient Position: Sitting, BP Cuff Size: Small adult)   Pulse 89   Temp 36.7 °C (98 °F) (Temporal)   Ht (S) 1.665 m (5' 5.55\")   Wt 69.4 kg (153 lb 1.8 oz)   SpO2 97%   BMI 25.05 kg/m²     Physical Exam  Vitals and nursing note reviewed.   Constitutional:       General: She is not in acute distress.     Appearance: Normal appearance. She is not toxic-appearing.   HENT:      Head: Normocephalic and atraumatic.   Eyes:      Conjunctiva/sclera: Conjunctivae normal.   Cardiovascular:      Rate and Rhythm: Normal rate.   Pulmonary:      Effort: Pulmonary effort is normal. No respiratory distress.   Abdominal:      General: Abdomen is flat.      Palpations: Abdomen is soft.   Musculoskeletal:         General: No swelling. Normal range of motion.      Cervical back: Normal range of motion.   Skin:     General: Skin is warm and dry.   Neurological:      Mental Status: She is alert.   Psychiatric:         Mood and Affect: Mood normal.       Breast Examination:    Left breast: No masses, skin or nipple changes  Right breast: No masses, skin or nipple changes; Pain with palpation of the inferior aspect of the breast into the lateral edge without palpable finding.   Axillary: No significant examination findings    ECOG Performance Status:     [x] 0 Fully active, able to carry on all pre-disease performance without restriction  [] 1 Restricted in physically strenuous activity but ambulatory and able to carry out work of a light or sedentary nature, e.g., light house work, office work  [] 2 Ambulatory and capable of all selfcare but unable to carry out any work activities; up and about more than 50% of waking hours  [] 3 Capable of only limited selfcare; confined to bed or chair more than 50% of waking hours  [] 4 Completely disabled; cannot carry on any selfcare; " totally confined to bed or chair  [] 5 Dead     Results:    Labs:  Reviewed    Lab Results   Component Value Date    WBC 4.1 (L) 07/02/2024    HGB 12.7 07/02/2024    HCT 39.3 07/02/2024    MCV 94 07/02/2024     07/02/2024       Chemistry    Lab Results   Component Value Date/Time     07/02/2024 1025    K 3.9 07/02/2024 1025     07/02/2024 1025    CO2 30 07/02/2024 1025    BUN 9 07/02/2024 1025    CREATININE 0.67 07/02/2024 1025    Lab Results   Component Value Date/Time    CALCIUM 9.0 07/02/2024 1025    ALKPHOS 86 07/02/2024 1025    AST 17 07/02/2024 1025    ALT 15 07/02/2024 1025    BILITOT 0.4 07/02/2024 1025             Imaging:  Reviewed above in Onc History    Pathology:  Reviewed above in Onc History    Assessment:     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     Presents today to establish care. Reports new right breast pain that is notable on exam without palpable finding. Will obtain her breast imaging sooner.       Breast Cancer Treatment Plan:    Surgical Plan: Right lumpectomy/SLNBx   Additional biopsy: No further biopsy indicated  Radiation Plan: Completed  Additional staging scans/DEXA/echo:   Additional Path info (i.e Ki67, PDL1): Not indicated  Gene assays:  Not indicated    Systemic treatment plan: No current systemic therapy     Intent: Curative   Clinical trial: Not eligible for our current trials   Endocrine therapy: Not indicated   HER2 treatment: not indicated   Targeted agents: not indicated   Chemotherapy:  S/p  regimen, received 4 of 9 adjuvant pembro   BMA: Not indicated    Access:  removed  Supportive meds: No new supportive medications prescribed  Genetic testing: Completed; negative  Fertility preservation: Not indicated    Other active problems/orders:     R breast pain: diagnostic breast imaging ordered stat today    Surveillance plan: Continue yearly mammogram    Follow-up: Will  call patient with results. Otherwise, 6 months follow-up with Ema Fernando PA-C    Jose Honeycutt expressed understanding of the plan outlined above. Jose Honeycutt had ample time to ask questions. Jose Honeycutt understands she can contact us at any time should she have additional questions or issues arise in the interim.    Jerad Holliday MD  Breast Medical Oncology  [facility]    Portions of this note were dictated utilizing voice recognition software.

## 2025-01-29 ENCOUNTER — HOSPITAL ENCOUNTER (OUTPATIENT)
Dept: RADIOLOGY | Facility: CLINIC | Age: 45
Discharge: HOME | End: 2025-01-29
Payer: COMMERCIAL

## 2025-01-29 DIAGNOSIS — C50.919 MALIGNANT NEOPLASM OF BREAST IN FEMALE, ESTROGEN RECEPTOR NEGATIVE, UNSPECIFIED LATERALITY, UNSPECIFIED SITE OF BREAST: ICD-10-CM

## 2025-01-29 DIAGNOSIS — Z17.1 MALIGNANT NEOPLASM OF BREAST IN FEMALE, ESTROGEN RECEPTOR NEGATIVE, UNSPECIFIED LATERALITY, UNSPECIFIED SITE OF BREAST: ICD-10-CM

## 2025-01-29 PROCEDURE — 77062 BREAST TOMOSYNTHESIS BI: CPT

## 2025-01-29 PROCEDURE — 77066 DX MAMMO INCL CAD BI: CPT | Performed by: STUDENT IN AN ORGANIZED HEALTH CARE EDUCATION/TRAINING PROGRAM

## 2025-01-29 PROCEDURE — 77062 BREAST TOMOSYNTHESIS BI: CPT | Performed by: STUDENT IN AN ORGANIZED HEALTH CARE EDUCATION/TRAINING PROGRAM

## 2025-02-03 ENCOUNTER — APPOINTMENT (OUTPATIENT)
Dept: DERMATOLOGY | Facility: HOSPITAL | Age: 45
End: 2025-02-03
Payer: COMMERCIAL

## 2025-04-04 ENCOUNTER — APPOINTMENT (OUTPATIENT)
Dept: HEMATOLOGY/ONCOLOGY | Facility: HOSPITAL | Age: 45
End: 2025-04-04
Payer: COMMERCIAL

## 2025-04-11 ENCOUNTER — APPOINTMENT (OUTPATIENT)
Dept: HEMATOLOGY/ONCOLOGY | Facility: HOSPITAL | Age: 45
End: 2025-04-11
Payer: COMMERCIAL

## 2025-07-30 ENCOUNTER — OFFICE VISIT (OUTPATIENT)
Dept: HEMATOLOGY/ONCOLOGY | Facility: CLINIC | Age: 45
End: 2025-07-30
Payer: COMMERCIAL

## 2025-07-30 VITALS
HEART RATE: 72 BPM | WEIGHT: 149.03 LBS | TEMPERATURE: 98.1 F | BODY MASS INDEX: 24.38 KG/M2 | DIASTOLIC BLOOD PRESSURE: 90 MMHG | RESPIRATION RATE: 16 BRPM | OXYGEN SATURATION: 98 % | SYSTOLIC BLOOD PRESSURE: 135 MMHG

## 2025-07-30 DIAGNOSIS — Z12.31 SCREENING MAMMOGRAM FOR BREAST CANCER: Primary | ICD-10-CM

## 2025-07-30 DIAGNOSIS — C50.919 MALIGNANT NEOPLASM OF BREAST IN FEMALE, ESTROGEN RECEPTOR NEGATIVE, UNSPECIFIED LATERALITY, UNSPECIFIED SITE OF BREAST: ICD-10-CM

## 2025-07-30 DIAGNOSIS — Z17.1 MALIGNANT NEOPLASM OF BREAST IN FEMALE, ESTROGEN RECEPTOR NEGATIVE, UNSPECIFIED LATERALITY, UNSPECIFIED SITE OF BREAST: ICD-10-CM

## 2025-07-30 PROCEDURE — 99214 OFFICE O/P EST MOD 30 MIN: CPT

## 2025-07-30 PROCEDURE — 3080F DIAST BP >= 90 MM HG: CPT

## 2025-07-30 PROCEDURE — 3075F SYST BP GE 130 - 139MM HG: CPT

## 2025-07-30 ASSESSMENT — PAIN SCALES - GENERAL: PAINLEVEL_OUTOF10: 0-NO PAIN

## 2025-07-30 NOTE — PROGRESS NOTES
Patient here for follow up with Ema REESE.  Medications and allergies reviewed with patient.     Patient states that she is doing well overall. She reports intermittent pain and tenderness in her right breast. She denies pain, nausea, vomiting, diarrhea, constipation, difficulty eating or weight loss.    Per orders patient to get mammogram end of January and follow up in February.     Patient verbalized understanding using the teach back method, no further questions at this time.

## 2025-07-30 NOTE — PROGRESS NOTES
Breast Medical Oncology Clinic  Location: [place]    Visit Type: follow up visit     Oncologic History:    -1/10/23: Bilateral screening mammogram showed a 1.2 cm density in right breast, as well as a 7mm intramammary LN.  -1/12/23: Dx MG/US confirmed a 1.1 x 0.9 x 1.1 cm hypoechoic mass at 8:00, 6 cm FN. At 9:00, 6 cm FN, a 0.5 cm mass noted, possible an intramammary LN. The right axillary LN’s appeared normal.  -1/19/22: US-guided CNB of 8:00 mass showed IDC, grade 3, ER/IL-negative Her2-negative (1+ IHC). 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  -Genetic testing performed, negative for pathogenic mutations.  -Neoadjuvant chemoimmunotherapy (KEYNOTE-522 regimen) initiated 2/16/23: Pembrolizumab with carboplatin/paclitaxel, followed by doxorubicin/cyclophosphamide. Completed 7/20/23.  -8/25/23: Right lumpectomy/SLNBx performed. No residual carcinoma noted. 0/4 LN's (one sentinel node) involved. ypT0 ypN0.  - Adjuvant Pembrolizumab -10/19/23 - 12/28/2023 (completed 4 cycles adjuvant setting, discontinued for patient preference and development of arthritis).  - Radiation therapy, completed 12/2023.    Subjective: History of Present Illness  Patient presents today for follow-up.    Reports a few months ago developed pain in the R breast inframammary fold along lateral edge, unchanged from prior visit. Very cyclical.     Menses have returned.     Denies weight loss, new aches or pains, changes in appetite or energy level.     She has a number of trips coming up: [place], [place], [place], [place].       Social History  Jose Honeycutt  reports that she has never smoked. She has never been exposed to tobacco smoke. She has never used smokeless tobacco.  She  reports no history of alcohol use.  She  reports no history of drug use.    ROS:   ROS 14 points performed, See HPI for exceptions      Physical Examination:    /90 (BP Location: Left arm, Patient Position: Sitting, BP Cuff  Size: Adult long)   Pulse 72   Temp 36.7 °C (98.1 °F) (Temporal)   Resp 16   Wt 67.6 kg (149 lb 0.5 oz)   SpO2 98%   BMI 24.38 kg/m²     Physical Exam  Vitals and nursing note reviewed.   Constitutional:       General: She is awake. She is not in acute distress.     Appearance: Normal appearance. She is not toxic-appearing.   HENT:      Head: Normocephalic and atraumatic.      Mouth/Throat:      Mouth: Mucous membranes are moist.      Pharynx: Oropharynx is clear.     Eyes:      Conjunctiva/sclera: Conjunctivae normal.      Pupils: Pupils are equal, round, and reactive to light.     Neck:      Trachea: Trachea and phonation normal. No tracheal tenderness.     Cardiovascular:      Rate and Rhythm: Normal rate and regular rhythm.      Pulses: Normal pulses.      Heart sounds: Normal heart sounds. No murmur heard.  Pulmonary:      Effort: Pulmonary effort is normal. No respiratory distress.      Breath sounds: Normal breath sounds.   Chest:      Chest wall: No mass or deformity.   Breasts:     Right: No swelling, mass, nipple discharge, skin change or tenderness.      Left: No swelling, mass, nipple discharge, skin change or tenderness.      Comments: S/p right lumpectomy well healed with thickening of her axillary scar   Abdominal:      General: Abdomen is flat. Bowel sounds are normal. There is no distension.      Palpations: Abdomen is soft. There is no mass.      Tenderness: There is no abdominal tenderness. There is no guarding.     Musculoskeletal:         General: No swelling. Normal range of motion.      Cervical back: Normal range of motion.   Lymphadenopathy:      Upper Body:      Right upper body: No supraclavicular, axillary or pectoral adenopathy.      Left upper body: No supraclavicular, axillary or pectoral adenopathy.     Skin:     General: Skin is warm and dry.      Capillary Refill: Capillary refill takes less than 2 seconds.      Findings: No rash.     Neurological:      General: No focal  deficit present.      Mental Status: She is alert and oriented to person, place, and time.      Motor: No weakness.     Psychiatric:         Mood and Affect: Mood normal.         Behavior: Behavior normal.         Thought Content: Thought content normal.         Judgment: Judgment normal.       Results:    Labs:  Reviewed    Lab Results   Component Value Date    WBC 4.1 (L) 07/02/2024    HGB 12.7 07/02/2024    HCT 39.3 07/02/2024    MCV 94 07/02/2024     07/02/2024       Chemistry    Lab Results   Component Value Date/Time     07/02/2024 1025    K 3.9 07/02/2024 1025     07/02/2024 1025    CO2 30 07/02/2024 1025    BUN 9 07/02/2024 1025    CREATININE 0.67 07/02/2024 1025    Lab Results   Component Value Date/Time    CALCIUM 9.0 07/02/2024 1025    ALKPHOS 86 07/02/2024 1025    AST 17 07/02/2024 1025    ALT 15 07/02/2024 1025    BILITOT 0.4 07/02/2024 1025             Imaging:  Reviewed above in Onc History    Pathology:  Reviewed above in Onc History    Assessment:     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      Malignant neoplasm of female breast (TNBC).  - No new concerning symptoms or examination findings today.  - Mammogram next due after 1/29/2026, ordered today   - Breast massage and NSAIDs use encouraged for breast discomfort     There is no evidence of breast cancer recurrence based on her clinical exam today.     Grade 1 irAE - inflammatory arthritis- resolved.  No further pembrolizumab given after 4 cycles     Disposition   - FUV in 6 months with Ema Fernando PA-C   - Encouraged to call with any questions, concerns or treatment intolerance prior to next office visit     Ema Fernando PA-C

## 2025-08-25 ENCOUNTER — APPOINTMENT (OUTPATIENT)
Dept: DERMATOLOGY | Facility: HOSPITAL | Age: 45
End: 2025-08-25
Payer: COMMERCIAL

## 2026-02-23 ENCOUNTER — APPOINTMENT (OUTPATIENT)
Dept: DERMATOLOGY | Facility: HOSPITAL | Age: 46
End: 2026-02-23
Payer: COMMERCIAL